# Patient Record
Sex: MALE | Race: WHITE | Employment: OTHER | URBAN - METROPOLITAN AREA
[De-identification: names, ages, dates, MRNs, and addresses within clinical notes are randomized per-mention and may not be internally consistent; named-entity substitution may affect disease eponyms.]

---

## 2018-10-11 ENCOUNTER — HOSPITAL ENCOUNTER (OUTPATIENT)
Dept: NUCLEAR MEDICINE | Age: 63
Discharge: HOME | End: 2018-10-11
Payer: COMMERCIAL

## 2018-10-11 DIAGNOSIS — C61 MALIGNANT NEOPLASM OF PROSTATE (HCC): ICD-10-CM

## 2018-10-11 PROCEDURE — A9503 TC99M MEDRONATE: HCPCS | Performed by: FAMILY MEDICINE

## 2018-10-11 PROCEDURE — 3430000000 HC RX DIAGNOSTIC RADIOPHARMACEUTICAL: Performed by: FAMILY MEDICINE

## 2018-10-11 PROCEDURE — 78306 BONE IMAGING WHOLE BODY: CPT

## 2018-10-11 RX ORDER — TC 99M MEDRONATE 20 MG/10ML
25 INJECTION, POWDER, LYOPHILIZED, FOR SOLUTION INTRAVENOUS
Status: COMPLETED | OUTPATIENT
Start: 2018-10-11 | End: 2018-10-11

## 2018-10-11 RX ADMIN — Medication 25 MILLICURIE: at 07:46

## 2019-09-22 ENCOUNTER — HOSPITAL ENCOUNTER (EMERGENCY)
Age: 64
Discharge: HOME | End: 2019-09-22
Payer: MEDICARE

## 2019-09-22 ENCOUNTER — APPOINTMENT (OUTPATIENT)
Dept: CT IMAGING | Age: 64
End: 2019-09-22
Payer: MEDICARE

## 2019-09-22 VITALS
HEART RATE: 65 BPM | TEMPERATURE: 97.9 F | SYSTOLIC BLOOD PRESSURE: 148 MMHG | RESPIRATION RATE: 15 BRPM | OXYGEN SATURATION: 98 % | DIASTOLIC BLOOD PRESSURE: 78 MMHG

## 2019-09-22 DIAGNOSIS — S01.01XA LACERATION OF SCALP, INITIAL ENCOUNTER: ICD-10-CM

## 2019-09-22 DIAGNOSIS — S09.90XA INJURY OF HEAD, INITIAL ENCOUNTER: ICD-10-CM
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
## 2019-09-22 PROCEDURE — 70450 CT HEAD/BRAIN W/O DYE: CPT

## 2019-09-22 PROCEDURE — 90715 TDAP VACCINE 7 YRS/> IM: CPT | Performed by: PHYSICIAN ASSISTANT

## 2019-09-22 PROCEDURE — 90471 IMMUNIZATION ADMIN: CPT | Performed by: PHYSICIAN ASSISTANT

## 2019-09-22 PROCEDURE — 99284 EMERGENCY DEPT VISIT MOD MDM: CPT

## 2019-09-22 PROCEDURE — 6360000002 HC RX W HCPCS: Performed by: PHYSICIAN ASSISTANT

## 2019-09-22 PROCEDURE — 4500000024 HC ED LEVEL 4 PROCEDURE

## 2019-09-22 RX ORDER — MULTIVITAMIN
1 TABLET ORAL
COMMUNITY

## 2019-09-22 RX ORDER — ZOLPIDEM TARTRATE 10 MG/1
10 TABLET ORAL
COMMUNITY
Start: 2019-09-12 | End: 2019-10-12

## 2019-09-22 RX ADMIN — TETANUS TOXOID, REDUCED DIPHTHERIA TOXOID AND ACELLULAR PERTUSSIS VACCINE, ADSORBED 0.5 ML: 5; 2.5; 8; 8; 2.5 SUSPENSION INTRAMUSCULAR at 22:03

## 2019-09-22 ASSESSMENT — PAIN DESCRIPTION - ONSET: ONSET: SUDDEN

## 2019-09-22 ASSESSMENT — ENCOUNTER SYMPTOMS
NAUSEA: 0
VOMITING: 0
EYES NEGATIVE: 1

## 2019-09-22 ASSESSMENT — PAIN DESCRIPTION - FREQUENCY: FREQUENCY: CONTINUOUS

## 2019-09-22 ASSESSMENT — PAIN DESCRIPTION - LOCATION: LOCATION: HEAD

## 2019-09-22 ASSESSMENT — PAIN - FUNCTIONAL ASSESSMENT: PAIN_FUNCTIONAL_ASSESSMENT: ACTIVITIES ARE NOT PREVENTED

## 2019-09-22 ASSESSMENT — PAIN DESCRIPTION - DESCRIPTORS: DESCRIPTORS: DISCOMFORT

## 2019-09-22 ASSESSMENT — PAIN DESCRIPTION - PROGRESSION: CLINICAL_PROGRESSION: GRADUALLY WORSENING

## 2019-09-22 ASSESSMENT — PAIN DESCRIPTION - PAIN TYPE: TYPE: ACUTE PAIN

## 2019-09-22 ASSESSMENT — PAIN SCALES - GENERAL: PAINLEVEL_OUTOF10: 2

## 2019-09-22 ASSESSMENT — PAIN DESCRIPTION - ORIENTATION: ORIENTATION: POSTERIOR

## 2019-09-23 NOTE — ED NOTES
Bed: B-07  Expected date:   Expected time:   Means of arrival:   Comments:  Head patricia Esposito, CHRISTOPHER  09/22/19 2120

## 2019-09-23 NOTE — ED NOTES
Lac on back of right side of head cleansed at this time. Lac still bleeding slightly. 4x4's placed to lac and secured with tape until Ty Quigley can take a look.       Elie Walker RN  09/22/19 6735

## 2024-02-26 ENCOUNTER — APPOINTMENT (OUTPATIENT)
Dept: GENERAL RADIOLOGY | Age: 69
DRG: 102 | End: 2024-02-26
Payer: MEDICARE

## 2024-02-26 ENCOUNTER — HOSPITAL ENCOUNTER (INPATIENT)
Age: 69
LOS: 1 days | Discharge: HOME | DRG: 102 | End: 2024-02-28
Attending: STUDENT IN AN ORGANIZED HEALTH CARE EDUCATION/TRAINING PROGRAM | Admitting: FAMILY MEDICINE
Payer: MEDICARE

## 2024-02-26 ENCOUNTER — APPOINTMENT (OUTPATIENT)
Dept: CT IMAGING | Age: 69
DRG: 102 | End: 2024-02-26
Payer: MEDICARE

## 2024-02-26 DIAGNOSIS — G44.89 OTHER HEADACHE SYNDROME: Primary | ICD-10-CM

## 2024-02-26 DIAGNOSIS — I16.1 HYPERTENSIVE EMERGENCY: ICD-10-CM

## 2024-02-26 LAB
ANION GAP SERPL CALCULATED.3IONS-SCNC: 16 MMOL/L (ref 3–16)
BASOPHILS # BLD: 0 K/UL (ref 0–0.2)
BASOPHILS NFR BLD: 0.3 %
BUN SERPL-MCNC: 14 MG/DL (ref 7–20)
CALCIUM SERPL-MCNC: 10.2 MG/DL (ref 8.3–10.6)
CHLORIDE SERPL-SCNC: 94 MMOL/L (ref 99–110)
CO2 SERPL-SCNC: 22 MMOL/L (ref 21–32)
CREAT SERPL-MCNC: 0.7 MG/DL (ref 0.8–1.3)
DEPRECATED RDW RBC AUTO: 13.3 % (ref 12.4–15.4)
EOSINOPHIL # BLD: 0 K/UL (ref 0–0.6)
EOSINOPHIL NFR BLD: 0.2 %
GFR SERPLBLD CREATININE-BSD FMLA CKD-EPI: >60 ML/MIN/{1.73_M2}
GLUCOSE SERPL-MCNC: 116 MG/DL (ref 70–99)
HCT VFR BLD AUTO: 48.2 % (ref 40.5–52.5)
HGB BLD-MCNC: 16.5 G/DL (ref 13.5–17.5)
LYMPHOCYTES # BLD: 1.3 K/UL (ref 1–5.1)
LYMPHOCYTES NFR BLD: 8.9 %
MCH RBC QN AUTO: 33.3 PG (ref 26–34)
MCHC RBC AUTO-ENTMCNC: 34.2 G/DL (ref 31–36)
MCV RBC AUTO: 97.4 FL (ref 80–100)
MONOCYTES # BLD: 1.2 K/UL (ref 0–1.3)
MONOCYTES NFR BLD: 7.8 %
NEUTROPHILS # BLD: 12.4 K/UL (ref 1.7–7.7)
NEUTROPHILS NFR BLD: 82.8 %
PLATELET # BLD AUTO: 276 K/UL (ref 135–450)
PMV BLD AUTO: 9.2 FL (ref 5–10.5)
POTASSIUM SERPL-SCNC: 4.5 MMOL/L (ref 3.5–5.1)
RBC # BLD AUTO: 4.95 M/UL (ref 4.2–5.9)
SODIUM SERPL-SCNC: 132 MMOL/L (ref 136–145)
WBC # BLD AUTO: 15 K/UL (ref 4–11)

## 2024-02-26 PROCEDURE — 85025 COMPLETE CBC W/AUTO DIFF WBC: CPT

## 2024-02-26 PROCEDURE — 96365 THER/PROPH/DIAG IV INF INIT: CPT

## 2024-02-26 PROCEDURE — 96368 THER/DIAG CONCURRENT INF: CPT

## 2024-02-26 PROCEDURE — 36415 COLL VENOUS BLD VENIPUNCTURE: CPT

## 2024-02-26 PROCEDURE — 2580000003 HC RX 258: Performed by: PHYSICIAN ASSISTANT

## 2024-02-26 PROCEDURE — 80048 BASIC METABOLIC PNL TOTAL CA: CPT

## 2024-02-26 PROCEDURE — 6360000002 HC RX W HCPCS: Performed by: PHYSICIAN ASSISTANT

## 2024-02-26 PROCEDURE — 96375 TX/PRO/DX INJ NEW DRUG ADDON: CPT

## 2024-02-26 PROCEDURE — 6360000002 HC RX W HCPCS: Performed by: STUDENT IN AN ORGANIZED HEALTH CARE EDUCATION/TRAINING PROGRAM

## 2024-02-26 PROCEDURE — 71046 X-RAY EXAM CHEST 2 VIEWS: CPT

## 2024-02-26 PROCEDURE — 70450 CT HEAD/BRAIN W/O DYE: CPT

## 2024-02-26 PROCEDURE — 99285 EMERGENCY DEPT VISIT HI MDM: CPT

## 2024-02-26 PROCEDURE — 2580000003 HC RX 258: Performed by: STUDENT IN AN ORGANIZED HEALTH CARE EDUCATION/TRAINING PROGRAM

## 2024-02-26 RX ORDER — LABETALOL HYDROCHLORIDE 5 MG/ML
10 INJECTION, SOLUTION INTRAVENOUS ONCE
Status: COMPLETED | OUTPATIENT
Start: 2024-02-26 | End: 2024-02-26

## 2024-02-26 RX ADMIN — CEFTRIAXONE 1000 MG: 1 INJECTION, POWDER, FOR SOLUTION INTRAMUSCULAR; INTRAVENOUS at 22:26

## 2024-02-26 RX ADMIN — VANCOMYCIN HYDROCHLORIDE 2500 MG: 10 INJECTION, POWDER, LYOPHILIZED, FOR SOLUTION INTRAVENOUS at 23:06

## 2024-02-26 RX ADMIN — LABETALOL HYDROCHLORIDE 10 MG: 5 INJECTION, SOLUTION INTRAVENOUS at 21:59

## 2024-02-26 ASSESSMENT — ENCOUNTER SYMPTOMS
SHORTNESS OF BREATH: 0
COUGH: 0
NAUSEA: 1
ABDOMINAL PAIN: 0
DIARRHEA: 0
CHEST TIGHTNESS: 0
VOMITING: 1

## 2024-02-26 ASSESSMENT — LIFESTYLE VARIABLES
HOW MANY STANDARD DRINKS CONTAINING ALCOHOL DO YOU HAVE ON A TYPICAL DAY: 1 OR 2
HOW OFTEN DO YOU HAVE A DRINK CONTAINING ALCOHOL: MONTHLY OR LESS

## 2024-02-26 ASSESSMENT — PAIN - FUNCTIONAL ASSESSMENT
PAIN_FUNCTIONAL_ASSESSMENT: 0-10
PAIN_FUNCTIONAL_ASSESSMENT: 0-10

## 2024-02-26 ASSESSMENT — PAIN DESCRIPTION - PAIN TYPE: TYPE: ACUTE PAIN

## 2024-02-26 ASSESSMENT — PAIN SCALES - GENERAL
PAINLEVEL_OUTOF10: 4
PAINLEVEL_OUTOF10: 4

## 2024-02-26 ASSESSMENT — PAIN DESCRIPTION - LOCATION
LOCATION: HEAD
LOCATION: HEAD

## 2024-02-27 ENCOUNTER — APPOINTMENT (OUTPATIENT)
Dept: CT IMAGING | Age: 69
DRG: 102 | End: 2024-02-27
Payer: MEDICARE

## 2024-02-27 ENCOUNTER — HOSPITAL ENCOUNTER (OUTPATIENT)
Dept: INTERVENTIONAL RADIOLOGY/VASCULAR | Age: 69
Discharge: HOME | DRG: 102 | End: 2024-02-27
Payer: MEDICARE

## 2024-02-27 PROBLEM — I16.1 HYPERTENSIVE EMERGENCY: Status: ACTIVE | Noted: 2024-02-27

## 2024-02-27 PROBLEM — G91.9 HYDROCEPHALUS (HCC): Status: ACTIVE | Noted: 2024-02-27

## 2024-02-27 LAB
ANION GAP SERPL CALCULATED.3IONS-SCNC: 16 MMOL/L (ref 3–16)
BILIRUB UR QL STRIP.AUTO: NEGATIVE
BUN SERPL-MCNC: 13 MG/DL (ref 7–20)
CALCIUM SERPL-MCNC: 9.5 MG/DL (ref 8.3–10.6)
CHLORIDE SERPL-SCNC: 95 MMOL/L (ref 99–110)
CLARITY UR: CLEAR
CO2 SERPL-SCNC: 22 MMOL/L (ref 21–32)
COLOR UR: YELLOW
CREAT SERPL-MCNC: 0.8 MG/DL (ref 0.8–1.3)
GFR SERPLBLD CREATININE-BSD FMLA CKD-EPI: >60 ML/MIN/{1.73_M2}
GLUCOSE SERPL-MCNC: 131 MG/DL (ref 70–99)
GLUCOSE UR STRIP.AUTO-MCNC: NEGATIVE MG/DL
HGB UR QL STRIP.AUTO: NEGATIVE
INR PPP: 1.04 (ref 0.84–1.16)
KETONES UR STRIP.AUTO-MCNC: 15 MG/DL
LEUKOCYTE ESTERASE UR QL STRIP.AUTO: NEGATIVE
NITRITE UR QL STRIP.AUTO: NEGATIVE
PH UR STRIP.AUTO: 6 [PH] (ref 5–8)
POTASSIUM SERPL-SCNC: 3.6 MMOL/L (ref 3.5–5.1)
PROCALCITONIN SERPL IA-MCNC: 0.03 NG/ML (ref 0–0.15)
PROT UR STRIP.AUTO-MCNC: NEGATIVE MG/DL
PROTHROMBIN TIME: 13.7 SEC (ref 11.5–14.8)
SODIUM SERPL-SCNC: 133 MMOL/L (ref 136–145)
SP GR UR STRIP.AUTO: >=1.03 (ref 1–1.03)
UA COMPLETE W REFLEX CULTURE PNL UR: ABNORMAL
UA DIPSTICK W REFLEX MICRO PNL UR: ABNORMAL
URN SPEC COLLECT METH UR: ABNORMAL
UROBILINOGEN UR STRIP-ACNC: 0.2 E.U./DL

## 2024-02-27 PROCEDURE — 2580000003 HC RX 258

## 2024-02-27 PROCEDURE — 99223 1ST HOSP IP/OBS HIGH 75: CPT | Performed by: INTERNAL MEDICINE

## 2024-02-27 PROCEDURE — 6360000002 HC RX W HCPCS: Performed by: NURSE PRACTITIONER

## 2024-02-27 PROCEDURE — 96375 TX/PRO/DX INJ NEW DRUG ADDON: CPT

## 2024-02-27 PROCEDURE — 36415 COLL VENOUS BLD VENIPUNCTURE: CPT

## 2024-02-27 PROCEDURE — 81003 URINALYSIS AUTO W/O SCOPE: CPT

## 2024-02-27 PROCEDURE — 2000000000 HC ICU R&B

## 2024-02-27 PROCEDURE — 87040 BLOOD CULTURE FOR BACTERIA: CPT

## 2024-02-27 PROCEDURE — 6370000000 HC RX 637 (ALT 250 FOR IP): Performed by: PHYSICIAN ASSISTANT

## 2024-02-27 PROCEDURE — 6370000000 HC RX 637 (ALT 250 FOR IP): Performed by: INTERNAL MEDICINE

## 2024-02-27 PROCEDURE — 77003 FLUOROGUIDE FOR SPINE INJECT: CPT

## 2024-02-27 PROCEDURE — 6360000002 HC RX W HCPCS: Performed by: PHYSICIAN ASSISTANT

## 2024-02-27 PROCEDURE — 96366 THER/PROPH/DIAG IV INF ADDON: CPT

## 2024-02-27 PROCEDURE — 6360000002 HC RX W HCPCS: Performed by: EMERGENCY MEDICINE

## 2024-02-27 PROCEDURE — 6360000002 HC RX W HCPCS

## 2024-02-27 PROCEDURE — 85610 PROTHROMBIN TIME: CPT

## 2024-02-27 PROCEDURE — 84145 PROCALCITONIN (PCT): CPT

## 2024-02-27 PROCEDURE — 62273 INJECT EPIDURAL PATCH: CPT

## 2024-02-27 PROCEDURE — 6370000000 HC RX 637 (ALT 250 FOR IP)

## 2024-02-27 PROCEDURE — 80048 BASIC METABOLIC PNL TOTAL CA: CPT

## 2024-02-27 PROCEDURE — 99222 1ST HOSP IP/OBS MODERATE 55: CPT | Performed by: NURSE PRACTITIONER

## 2024-02-27 PROCEDURE — 96376 TX/PRO/DX INJ SAME DRUG ADON: CPT

## 2024-02-27 PROCEDURE — 2580000003 HC RX 258: Performed by: EMERGENCY MEDICINE

## 2024-02-27 PROCEDURE — 70450 CT HEAD/BRAIN W/O DYE: CPT

## 2024-02-27 PROCEDURE — 3E0R3GC INTRODUCTION OF OTHER THERAPEUTIC SUBSTANCE INTO SPINAL CANAL, PERCUTANEOUS APPROACH: ICD-10-PCS | Performed by: RADIOLOGY

## 2024-02-27 RX ORDER — LABETALOL HYDROCHLORIDE 5 MG/ML
10 INJECTION, SOLUTION INTRAVENOUS ONCE
Status: COMPLETED | OUTPATIENT
Start: 2024-02-27 | End: 2024-02-27

## 2024-02-27 RX ORDER — ACETAMINOPHEN 650 MG/1
650 SUPPOSITORY RECTAL EVERY 6 HOURS PRN
Status: DISCONTINUED | OUTPATIENT
Start: 2024-02-27 | End: 2024-02-28 | Stop reason: HOSPADM

## 2024-02-27 RX ORDER — ZOLPIDEM TARTRATE 10 MG/1
10 TABLET ORAL NIGHTLY PRN
COMMUNITY

## 2024-02-27 RX ORDER — DEXAMETHASONE 4 MG/1
4 TABLET ORAL EVERY 12 HOURS SCHEDULED
Status: DISCONTINUED | OUTPATIENT
Start: 2024-02-27 | End: 2024-02-28 | Stop reason: HOSPADM

## 2024-02-27 RX ORDER — POLYETHYLENE GLYCOL 3350 17 G/17G
17 POWDER, FOR SOLUTION ORAL DAILY PRN
Status: DISCONTINUED | OUTPATIENT
Start: 2024-02-27 | End: 2024-02-28 | Stop reason: HOSPADM

## 2024-02-27 RX ORDER — HYDRALAZINE HYDROCHLORIDE 10 MG/1
10 TABLET, FILM COATED ORAL EVERY 8 HOURS SCHEDULED
Status: DISCONTINUED | OUTPATIENT
Start: 2024-02-27 | End: 2024-02-27

## 2024-02-27 RX ORDER — ONDANSETRON 2 MG/ML
4 INJECTION INTRAMUSCULAR; INTRAVENOUS EVERY 6 HOURS PRN
Status: DISCONTINUED | OUTPATIENT
Start: 2024-02-27 | End: 2024-02-28 | Stop reason: HOSPADM

## 2024-02-27 RX ORDER — METOPROLOL SUCCINATE 25 MG/1
25 TABLET, EXTENDED RELEASE ORAL DAILY
Status: ON HOLD | COMMUNITY
End: 2024-02-28 | Stop reason: HOSPADM

## 2024-02-27 RX ORDER — MORPHINE SULFATE 4 MG/ML
4 INJECTION INTRAVENOUS ONCE
Status: COMPLETED | OUTPATIENT
Start: 2024-02-27 | End: 2024-02-27

## 2024-02-27 RX ORDER — LISINOPRIL 10 MG/1
10 TABLET ORAL DAILY
Status: DISCONTINUED | OUTPATIENT
Start: 2024-02-27 | End: 2024-02-28

## 2024-02-27 RX ORDER — ONDANSETRON 4 MG/1
4 TABLET, ORALLY DISINTEGRATING ORAL EVERY 8 HOURS PRN
Status: DISCONTINUED | OUTPATIENT
Start: 2024-02-27 | End: 2024-02-28 | Stop reason: HOSPADM

## 2024-02-27 RX ORDER — ACETAMINOPHEN 325 MG/1
650 TABLET ORAL EVERY 6 HOURS PRN
Status: DISCONTINUED | OUTPATIENT
Start: 2024-02-27 | End: 2024-02-28 | Stop reason: HOSPADM

## 2024-02-27 RX ORDER — SODIUM CHLORIDE 0.9 % (FLUSH) 0.9 %
5-40 SYRINGE (ML) INJECTION PRN
Status: DISCONTINUED | OUTPATIENT
Start: 2024-02-27 | End: 2024-02-28 | Stop reason: HOSPADM

## 2024-02-27 RX ORDER — LABETALOL HYDROCHLORIDE 5 MG/ML
10 INJECTION, SOLUTION INTRAVENOUS EVERY 4 HOURS PRN
Status: DISCONTINUED | OUTPATIENT
Start: 2024-02-27 | End: 2024-02-28 | Stop reason: HOSPADM

## 2024-02-27 RX ORDER — METOPROLOL SUCCINATE 50 MG/1
50 TABLET, EXTENDED RELEASE ORAL DAILY
Status: DISCONTINUED | OUTPATIENT
Start: 2024-02-27 | End: 2024-02-28 | Stop reason: HOSPADM

## 2024-02-27 RX ORDER — SODIUM CHLORIDE 0.9 % (FLUSH) 0.9 %
5-40 SYRINGE (ML) INJECTION EVERY 12 HOURS SCHEDULED
Status: DISCONTINUED | OUTPATIENT
Start: 2024-02-27 | End: 2024-02-28 | Stop reason: HOSPADM

## 2024-02-27 RX ORDER — SODIUM CHLORIDE 9 MG/ML
INJECTION, SOLUTION INTRAVENOUS PRN
Status: DISCONTINUED | OUTPATIENT
Start: 2024-02-27 | End: 2024-02-28 | Stop reason: HOSPADM

## 2024-02-27 RX ADMIN — ACETAMINOPHEN 650 MG: 325 TABLET ORAL at 15:01

## 2024-02-27 RX ADMIN — VANCOMYCIN HYDROCHLORIDE 1250 MG: 10 INJECTION, POWDER, LYOPHILIZED, FOR SOLUTION INTRAVENOUS at 20:21

## 2024-02-27 RX ADMIN — HYDRALAZINE HYDROCHLORIDE 10 MG: 10 TABLET, FILM COATED ORAL at 00:38

## 2024-02-27 RX ADMIN — CEFTRIAXONE SODIUM 2000 MG: 2 INJECTION, POWDER, FOR SOLUTION INTRAMUSCULAR; INTRAVENOUS at 11:50

## 2024-02-27 RX ADMIN — SODIUM CHLORIDE, PRESERVATIVE FREE 10 ML: 5 INJECTION INTRAVENOUS at 20:21

## 2024-02-27 RX ADMIN — SODIUM CHLORIDE 7.5 MG/HR: 9 INJECTION, SOLUTION INTRAVENOUS at 09:54

## 2024-02-27 RX ADMIN — ACETAMINOPHEN 650 MG: 325 TABLET ORAL at 21:50

## 2024-02-27 RX ADMIN — VANCOMYCIN HYDROCHLORIDE 1250 MG: 10 INJECTION, POWDER, LYOPHILIZED, FOR SOLUTION INTRAVENOUS at 10:15

## 2024-02-27 RX ADMIN — SODIUM CHLORIDE 7.5 MG/HR: 9 INJECTION, SOLUTION INTRAVENOUS at 02:21

## 2024-02-27 RX ADMIN — MORPHINE SULFATE 4 MG: 4 INJECTION INTRAVENOUS at 00:38

## 2024-02-27 RX ADMIN — DEXAMETHASONE 4 MG: 4 TABLET ORAL at 20:17

## 2024-02-27 RX ADMIN — CEFTRIAXONE SODIUM 2000 MG: 2 INJECTION, POWDER, FOR SOLUTION INTRAMUSCULAR; INTRAVENOUS at 21:55

## 2024-02-27 RX ADMIN — METOPROLOL SUCCINATE 50 MG: 50 TABLET, EXTENDED RELEASE ORAL at 09:24

## 2024-02-27 RX ADMIN — LABETALOL HYDROCHLORIDE 10 MG: 5 INJECTION, SOLUTION INTRAVENOUS at 00:38

## 2024-02-27 RX ADMIN — LABETALOL HYDROCHLORIDE 10 MG: 5 INJECTION, SOLUTION INTRAVENOUS at 23:18

## 2024-02-27 RX ADMIN — DEXAMETHASONE 4 MG: 4 TABLET ORAL at 11:49

## 2024-02-27 RX ADMIN — ACETAMINOPHEN 650 MG: 325 TABLET ORAL at 09:27

## 2024-02-27 RX ADMIN — LISINOPRIL 10 MG: 10 TABLET ORAL at 14:51

## 2024-02-27 RX ADMIN — ACETAMINOPHEN 650 MG: 325 TABLET ORAL at 04:49

## 2024-02-27 RX ADMIN — SODIUM CHLORIDE 7.5 MG/HR: 9 INJECTION, SOLUTION INTRAVENOUS at 06:17

## 2024-02-27 ASSESSMENT — PAIN DESCRIPTION - ORIENTATION
ORIENTATION: RIGHT;LEFT;MID;ANTERIOR;POSTERIOR
ORIENTATION: ANTERIOR

## 2024-02-27 ASSESSMENT — PAIN DESCRIPTION - LOCATION
LOCATION: HEAD

## 2024-02-27 ASSESSMENT — PAIN SCALES - GENERAL
PAINLEVEL_OUTOF10: 4
PAINLEVEL_OUTOF10: 5
PAINLEVEL_OUTOF10: 4
PAINLEVEL_OUTOF10: 9
PAINLEVEL_OUTOF10: 4
PAINLEVEL_OUTOF10: 5

## 2024-02-27 ASSESSMENT — PAIN DESCRIPTION - DESCRIPTORS
DESCRIPTORS: ACHING;DISCOMFORT

## 2024-02-27 ASSESSMENT — ENCOUNTER SYMPTOMS
CHEST TIGHTNESS: 0
SHORTNESS OF BREATH: 0
VOMITING: 0
ABDOMINAL PAIN: 0
NAUSEA: 0
COUGH: 0

## 2024-02-27 ASSESSMENT — PAIN SCALES - WONG BAKER
WONGBAKER_NUMERICALRESPONSE: 8
WONGBAKER_NUMERICALRESPONSE: 8

## 2024-02-27 ASSESSMENT — PAIN - FUNCTIONAL ASSESSMENT
PAIN_FUNCTIONAL_ASSESSMENT: ACTIVITIES ARE NOT PREVENTED
PAIN_FUNCTIONAL_ASSESSMENT: ACTIVITIES ARE NOT PREVENTED
PAIN_FUNCTIONAL_ASSESSMENT: PREVENTS OR INTERFERES WITH ALL ACTIVE AND SOME PASSIVE ACTIVITIES

## 2024-02-27 NOTE — CONSULTS
Clinical Pharmacy Progress Note  Medication History     Admit Date: 2/26/2024    Pharmacy consulted to verify home medication list by Dr BRANDAN Bush.    List of of current medications patient is taking is complete. Home Medication list in EPIC updated to reflect changes noted below.    Source of information: Patient interview at bedside, CareEverywhere documentation, pharmacy fills, OARRS report    [facility]'s [facility]     Changes made to medication list:   Medications added:   Metoprolol ER  Sertraline   Zolpidem prn  Other notes:   PS sent to ICU resident.    Current Outpatient Medications   Medication Instructions    metoprolol succinate (TOPROL XL) 25 mg, Oral, DAILY    Multiple Vitamin (MULTIVITAMIN) tablet 1 tablet, Oral    sertraline (ZOLOFT) 50 mg, Oral, DAILY    zolpidem (AMBIEN) 10 mg, Oral, NIGHTLY PRN       Please call with any questions.  Amira BarcenasD., BCPS   2/27/2024 1:51 PM  Wireless: [phone]

## 2024-02-27 NOTE — CARE COORDINATION
Case Management Assessment  Initial Evaluation    Date/Time of Evaluation: 2/27/2024 8:53 AM  Assessment Completed by: Mima Forrester RN    If patient is discharged prior to next notation, then this note serves as note for discharge by case management.    Patient Name: Elio Almodovar                   YOB: 1955  Diagnosis: Hydrocephalus (HCC) [G91.9]  Hypertensive emergency [I16.1]  Other headache syndrome [G44.89]                   Date / Time: 2/26/2024  8:53 PM    Patient Admission Status: Inpatient   Readmission Risk (Low < 19, Mod (19-27), High > 27): Readmission Risk Score: 11.1    Current PCP: Cecil Smith MD  PCP verified by CM? Yes    Chart Reviewed: Yes      History Provided by: Patient, Medical Record  Patient Orientation: Alert and Oriented    Patient Cognition: Alert    Hospitalization in the last 30 days (Readmission):  No    If yes, Readmission Assessment in CM Navigator will be completed.    Advance Directives:      Code Status: Full Code   Patient's Primary Decision Maker is: Legal Next of Kin      Discharge Planning:    Patient lives with: Spouse/Significant Other Type of Home: House  Primary Care Giver: Self  Patient Support Systems include: Spouse/Significant Other   Current Financial resources: Medicare  Current community resources: ECF/Home Care  Current services prior to admission: Durable Medical Equipment, Home Care ([facility])            Current DME: Cane, Walker            Type of Home Care services:  PT, OT, Nursing Services    ADLS  Prior functional level: Assistance with the following:, Mobility  Current functional level: Other (see comment) (TBD pending PTOT evals)    PT AM-PAC:   /24  OT AM-PAC:   /24    Family can provide assistance at DC: Other (comment) (tbd pending extent of needs)  Would you like Case Management to discuss the discharge plan with any other family members/significant others, and if so, who? Yes (spouse)  Plans to Return to Present Housing:

## 2024-02-27 NOTE — CONSULTS
ICU CONSULT       Hospital Day: 1  ICU Day: 1                                                         Code: Full Code  Admit Date: 2/26/2024  PCP: Cecil Smith MD                                  CC: Postural Headache    HISTORY OF PRESENT ILLNESS:   Elio Almodovar is a 68 y.o. male, presented with headaches. Patient has a PMHx of recent NPH lumbar trial, MDD, prostatic neoplasm, HTN.  He was recently discharged from [facility] on 2/19/24 where he had 4 days of NPH lumbar trial.  He did have a head CT which showed moderately enlarged ventricles out of proportion to cerebral sulci which could represent NPH.  He has been having these headaches since discharge.  Headaches are characterized by retro-orbital, intermittent sharp pain with a maximum of 5/10 pain and it would be exacerbated upon standing up.  He has never had any similar symptoms before.  He denies any fevers or chills. CSF last week showed RBC 36,109, total nucleated cells 2,364, neutrophils 37.     At the ED, /87-> 213/98. HR 69.  Labs were pertinent for Na 132, WBC 15.0, neutrophil absolute 12.4.  CT head without contrast showed possible small amount of intraventricular blood posterior right ventricle.  CXR negative for acute disease.     He will be admitted to [facility] to further manage his headaches and hypertensive urgency.    PAST HISTORY:   History reviewed. No pertinent past medical history.    Past Surgical History:   Procedure Laterality Date    PROSTATECTOMY         SocialHistory:   The patient lives at home with wife    Alcohol: Occasional use  Illicit drugs: Denies use  Tobacco: Denies use     Family History:  History reviewed. No pertinent family history.    MEDICATIONS:     No current facility-administered medications on file prior to encounter.     Current Outpatient Medications on File Prior to Encounter   Medication Sig Dispense Refill    Multiple Vitamin (MULTIVITAMIN) tablet Take 1 tablet by mouth           Scheduled Meds:

## 2024-02-27 NOTE — H&P
ICU Note H&P    Patient Name: Elio Almodovar   Admit Date: 2/26/2024   Code:No Order  PCP: Cecil Smith MD   Attending: Fei Francois MD    Chief Complaint: Headache (Pt. Presents to ED with c/o headache since last night. Recently admitted/discharged from [facility] with hydrocephalus-had a lumbar drain placed/removed. Called his doctor today to report headache and was advised to come to ED. )       Subjective   HPI:   Elio Almodovar is a 68 y.o. male, presented with headaches. Patient has a PMHx of recent NPH lumbar trial, MDD, prostatic neoplasm, HTN.  He was recently discharged from [facility] where he had 4 days of NPH lumbar trial.  He did have a head CT which showed moderately enlarged ventricles out of proportion to cerebral sulci which could represent NPH.  He has been having these headaches since discharge.  These headaches are characterized by retro-orbital, intermittent sharp pain with a maximum of 5/10 pain and it would be exacerbated upon standing up.  He has never had any similar symptoms before.  He denies any fevers or chills. CSF last week showed RBC 20203, total nucleated cells 2364, neutrophils 37.    At the ED, /87-> 213/98. HR 69.  Labs were pertinent for Na 132, WBC 15.0, neutrophil absolute 12.4.  CT head without contrast showed possible small amount of intraventricular blood posterior right ventricle.  CXR negative for acute disease.    He will be admitted to [facility] to further manage his headaches and hypertensive urgency.    Past Medical Hx:  History reviewed. No pertinent past medical history.    Past Surgical Hx:      Procedure Laterality Date    PROSTATECTOMY          Home Medication:  Prior to Admission medications    Medication Sig Start Date End Date Taking? Authorizing Provider   Multiple Vitamin (MULTIVITAMIN) tablet Take 1 tablet by mouth    Provider, MD Domitila       Allergies:  No Known Allergies    Social Hx:  Social History     Socioeconomic

## 2024-02-27 NOTE — ED PROVIDER NOTES
ED Attending Attestation Note     Date of evaluation: 2/26/2024    I have discussed the case with the AUSTYN. I have personally performed a history, physical exam, and my own medical decision making. I have reviewed the note and agree with the findings and plan. My assessment reveals a 68-year-old male who recently had a lumbar drain placed for hydrocephalus who was referred here for CT scan and admission for blood patch.  Our neurosurgery team is aware of his arrival and they have been paged for consult.  Given the patient's recent neurosurgical history with fever, leukocytosis, and headache, will plan to treat empirically for meningitis with vancomycin and ceftriaxone.  Neurosurgery recommends CT scan and admission for blood patch.  He will be admitted for aforementioned care.     Devendra Merida MD  03/01/24 0608    
never smoked. He has never used smokeless tobacco. He reports current alcohol use. He reports that he does not use drugs.    Medications     Previous Medications    MULTIPLE VITAMIN (MULTIVITAMIN) TABLET    Take 1 tablet by mouth       Allergies     He has No Known Allergies.    Physical Exam     INITIAL VITALS: BP: (!) 188/87, Temp: 98 °F (36.7 °C), Pulse: 69, Respirations: 18, SpO2: 96 %  Physical Exam  Constitutional:       Appearance: Normal appearance.   HENT:      Head: Normocephalic and atraumatic.   Cardiovascular:      Rate and Rhythm: Normal rate and regular rhythm.      Pulses: Normal pulses.      Heart sounds: Normal heart sounds.   Pulmonary:      Effort: Pulmonary effort is normal.      Breath sounds: Normal breath sounds.   Musculoskeletal:         General: Normal range of motion.      Cervical back: Normal range of motion.      Comments: On examination of the patient's LP site over the lumbar spine there is no evidence of infection including erythema, discharge, fluctuance or induration   Skin:     General: Skin is warm and dry.   Neurological:      Mental Status: He is alert and oriented to person, place, and time.   Psychiatric:         Mood and Affect: Mood normal.         Behavior: Behavior normal.       Diagnostic Results         RADIOLOGY:  CT HEAD WO CONTRAST    (Results Pending)       LABS:   No results found for this visit on 02/26/24.    ED BEDSIDE ULTRASOUND:  No results found.    RECENT VITALS:  BP: (!) 213/98, Temp: 98.6 °F (37 °C), Pulse: 69, Respirations: 18, SpO2: 92 %     Procedures         ED Course     Nursing Notes, Past Medical Hx,Past Surgical Hx, Social Hx, Allergies, and Family Hx were reviewed.         The patient was given the following medications:  No orders of the defined types were placed in this encounter.      CONSULTS:  IP CONSULT TO NEUROSURGERY    MEDICAL DECISION MAKING / ASSESSMENT / PLAN     Elio Almodovar is a 68 y.o. male who is status post placement of lumbar

## 2024-02-27 NOTE — CONSULTS
NEUROSURGERY CONSULT NOTE    ELIO ALMODOVAR  [id number]   1955 2/27/2024    Requesting physician: Fei Francois MD    Reason for consultation: HA since Lumbar drain trial     History of present illness: Elio Almodovar is a 68 y.o. male, presented with headaches. Patient has a PMHx of recent NPH lumbar trial, MDD, prostatic neoplasm, HTN.  He was recently discharged from [facility] on 2/19/24 where he had 4 days of NPH lumbar trial.  He did have a head CT which showed moderately enlarged ventricles out of proportion to cerebral sulci which could represent NPH.  He has small amt of IVH in bilat ventricles. Pt did say he was taking excedrin last dose on Saturday. He has been having these headaches since discharge.  Headaches are characterized by retro-orbital, intermittent sharp pain with a maximum of 5/10 pain and it would be exacerbated upon standing up. He has never had any similar symptoms before.  He denies any fevers or chills. CSF last week showed RBC 36,109, total nucleated cells 2,364, neutrophils 37.     ROS:   GENERAL:  Denies fever or recent illness. Denies weight changes   EYES:  Denies vision change or diplopia  EARS:  Denies hearing loss  CARDIAC:  Denies chest pain  RESPIRATORY:  Denies shortness of breath  SKIN:  Denies rash or lesions   HEM:  Denies excessive bruising  PSYCH:  Denies anxiety or depression  NEURO:  + headache, numbness or tingling or + lateralizing weakness chronic  :  Denies urinary difficulty  GI: Denies nausea, vomiting, diarrhea or constipation  MUSCULOSKELETAL:  No arthralgias    No Known Allergies    History reviewed. No pertinent past medical history.     Past Surgical History:   Procedure Laterality Date    PROSTATECTOMY         Social History     Occupational History    Not on file   Tobacco Use    Smoking status: Never    Smokeless tobacco: Never   Substance and Sexual Activity    Alcohol use: Yes     Comment: occ.    Drug use: Never    Sexual activity: Yes

## 2024-02-28 ENCOUNTER — APPOINTMENT (OUTPATIENT)
Dept: CT IMAGING | Age: 69
DRG: 102 | End: 2024-02-28
Payer: MEDICARE

## 2024-02-28 VITALS
SYSTOLIC BLOOD PRESSURE: 158 MMHG | DIASTOLIC BLOOD PRESSURE: 69 MMHG | WEIGHT: 217.37 LBS | RESPIRATION RATE: 17 BRPM | BODY MASS INDEX: 32.94 KG/M2 | HEART RATE: 59 BPM | TEMPERATURE: 98.6 F | OXYGEN SATURATION: 97 % | HEIGHT: 68 IN

## 2024-02-28 LAB
ANION GAP SERPL CALCULATED.3IONS-SCNC: 12 MMOL/L (ref 3–16)
BASOPHILS # BLD: 0 K/UL (ref 0–0.2)
BASOPHILS NFR BLD: 0.1 %
BUN SERPL-MCNC: 12 MG/DL (ref 7–20)
CALCIUM SERPL-MCNC: 9.2 MG/DL (ref 8.3–10.6)
CHLORIDE SERPL-SCNC: 95 MMOL/L (ref 99–110)
CO2 SERPL-SCNC: 24 MMOL/L (ref 21–32)
CREAT SERPL-MCNC: 0.6 MG/DL (ref 0.8–1.3)
DEPRECATED RDW RBC AUTO: 12.8 % (ref 12.4–15.4)
EOSINOPHIL # BLD: 0 K/UL (ref 0–0.6)
EOSINOPHIL NFR BLD: 0.1 %
GFR SERPLBLD CREATININE-BSD FMLA CKD-EPI: >60 ML/MIN/{1.73_M2}
GLUCOSE SERPL-MCNC: 142 MG/DL (ref 70–99)
HCT VFR BLD AUTO: 45.6 % (ref 40.5–52.5)
HGB BLD-MCNC: 15.9 G/DL (ref 13.5–17.5)
LYMPHOCYTES # BLD: 0.7 K/UL (ref 1–5.1)
LYMPHOCYTES NFR BLD: 6.4 %
MCH RBC QN AUTO: 33.5 PG (ref 26–34)
MCHC RBC AUTO-ENTMCNC: 34.8 G/DL (ref 31–36)
MCV RBC AUTO: 96.3 FL (ref 80–100)
MONOCYTES # BLD: 0.5 K/UL (ref 0–1.3)
MONOCYTES NFR BLD: 4.8 %
NEUTROPHILS # BLD: 9.7 K/UL (ref 1.7–7.7)
NEUTROPHILS NFR BLD: 88.6 %
PLATELET # BLD AUTO: 283 K/UL (ref 135–450)
PMV BLD AUTO: 8.7 FL (ref 5–10.5)
POTASSIUM SERPL-SCNC: 3.9 MMOL/L (ref 3.5–5.1)
RBC # BLD AUTO: 4.74 M/UL (ref 4.2–5.9)
SODIUM SERPL-SCNC: 131 MMOL/L (ref 136–145)
VANCOMYCIN SERPL-MCNC: 13.4 UG/ML
WBC # BLD AUTO: 10.9 K/UL (ref 4–11)

## 2024-02-28 PROCEDURE — 80202 ASSAY OF VANCOMYCIN: CPT

## 2024-02-28 PROCEDURE — 80048 BASIC METABOLIC PNL TOTAL CA: CPT

## 2024-02-28 PROCEDURE — 6370000000 HC RX 637 (ALT 250 FOR IP)

## 2024-02-28 PROCEDURE — 2580000003 HC RX 258

## 2024-02-28 PROCEDURE — 6360000002 HC RX W HCPCS

## 2024-02-28 PROCEDURE — 6370000000 HC RX 637 (ALT 250 FOR IP): Performed by: INTERNAL MEDICINE

## 2024-02-28 PROCEDURE — 70450 CT HEAD/BRAIN W/O DYE: CPT

## 2024-02-28 PROCEDURE — 36415 COLL VENOUS BLD VENIPUNCTURE: CPT

## 2024-02-28 PROCEDURE — 85025 COMPLETE CBC W/AUTO DIFF WBC: CPT

## 2024-02-28 PROCEDURE — 99232 SBSQ HOSP IP/OBS MODERATE 35: CPT | Performed by: INTERNAL MEDICINE

## 2024-02-28 PROCEDURE — 6360000002 HC RX W HCPCS: Performed by: NURSE PRACTITIONER

## 2024-02-28 PROCEDURE — 6370000000 HC RX 637 (ALT 250 FOR IP): Performed by: SURGERY

## 2024-02-28 RX ORDER — NIFEDIPINE 60 MG/1
60 TABLET, EXTENDED RELEASE ORAL DAILY
Status: DISCONTINUED | OUTPATIENT
Start: 2024-02-28 | End: 2024-02-28 | Stop reason: HOSPADM

## 2024-02-28 RX ORDER — LISINOPRIL 20 MG/1
20 TABLET ORAL ONCE
Status: COMPLETED | OUTPATIENT
Start: 2024-02-28 | End: 2024-02-28

## 2024-02-28 RX ORDER — HYDRALAZINE HYDROCHLORIDE 20 MG/ML
5 INJECTION INTRAMUSCULAR; INTRAVENOUS EVERY 4 HOURS PRN
Status: DISCONTINUED | OUTPATIENT
Start: 2024-02-28 | End: 2024-02-28 | Stop reason: HOSPADM

## 2024-02-28 RX ORDER — DEXAMETHASONE 4 MG/1
4 TABLET ORAL EVERY 12 HOURS SCHEDULED
Qty: 8 TABLET | Refills: 0 | Status: SHIPPED | OUTPATIENT
Start: 2024-02-28 | End: 2024-03-03

## 2024-02-28 RX ORDER — METOPROLOL SUCCINATE 50 MG/1
50 TABLET, EXTENDED RELEASE ORAL DAILY
Qty: 30 TABLET | Refills: 3 | Status: SHIPPED | OUTPATIENT
Start: 2024-02-29

## 2024-02-28 RX ORDER — NIFEDIPINE 60 MG/1
60 TABLET, EXTENDED RELEASE ORAL DAILY
Qty: 30 TABLET | Refills: 3 | Status: SHIPPED | OUTPATIENT
Start: 2024-02-28

## 2024-02-28 RX ORDER — METOPROLOL SUCCINATE 50 MG/1
50 TABLET, EXTENDED RELEASE ORAL DAILY
Qty: 30 TABLET | Refills: 3 | OUTPATIENT
Start: 2024-02-29

## 2024-02-28 RX ORDER — LISINOPRIL 20 MG/1
20 TABLET ORAL DAILY
Status: DISCONTINUED | OUTPATIENT
Start: 2024-02-29 | End: 2024-02-28 | Stop reason: HOSPADM

## 2024-02-28 RX ORDER — DEXAMETHASONE 4 MG/1
4 TABLET ORAL EVERY 12 HOURS SCHEDULED
Qty: 16 TABLET | Refills: 0 | OUTPATIENT
Start: 2024-02-28 | End: 2024-03-07

## 2024-02-28 RX ORDER — LISINOPRIL 10 MG/1
10 TABLET ORAL ONCE
Status: COMPLETED | OUTPATIENT
Start: 2024-02-28 | End: 2024-02-28

## 2024-02-28 RX ORDER — LISINOPRIL 20 MG/1
20 TABLET ORAL DAILY
Qty: 30 TABLET | Refills: 3 | Status: SHIPPED | OUTPATIENT
Start: 2024-02-29

## 2024-02-28 RX ORDER — LISINOPRIL 20 MG/1
20 TABLET ORAL DAILY
Qty: 30 TABLET | Refills: 3 | OUTPATIENT
Start: 2024-02-29

## 2024-02-28 RX ADMIN — DEXAMETHASONE 4 MG: 4 TABLET ORAL at 08:42

## 2024-02-28 RX ADMIN — LISINOPRIL 10 MG: 10 TABLET ORAL at 10:10

## 2024-02-28 RX ADMIN — ACETAMINOPHEN 650 MG: 325 TABLET ORAL at 05:21

## 2024-02-28 RX ADMIN — LISINOPRIL 20 MG: 20 TABLET ORAL at 11:52

## 2024-02-28 RX ADMIN — METOPROLOL SUCCINATE 50 MG: 50 TABLET, EXTENDED RELEASE ORAL at 08:42

## 2024-02-28 RX ADMIN — SODIUM CHLORIDE, PRESERVATIVE FREE 10 ML: 5 INJECTION INTRAVENOUS at 08:42

## 2024-02-28 RX ADMIN — HYDRALAZINE HYDROCHLORIDE 5 MG: 20 INJECTION INTRAMUSCULAR; INTRAVENOUS at 10:10

## 2024-02-28 RX ADMIN — NIFEDIPINE 60 MG: 60 TABLET, EXTENDED RELEASE ORAL at 12:15

## 2024-02-28 RX ADMIN — LISINOPRIL 10 MG: 10 TABLET ORAL at 08:42

## 2024-02-28 RX ADMIN — VANCOMYCIN HYDROCHLORIDE 1250 MG: 10 INJECTION, POWDER, LYOPHILIZED, FOR SOLUTION INTRAVENOUS at 08:39

## 2024-02-28 RX ADMIN — SERTRALINE HYDROCHLORIDE 50 MG: 50 TABLET ORAL at 10:11

## 2024-02-28 ASSESSMENT — ENCOUNTER SYMPTOMS
VOMITING: 0
COUGH: 0
NAUSEA: 0
ABDOMINAL PAIN: 0
CHEST TIGHTNESS: 0
DIARRHEA: 0
SHORTNESS OF BREATH: 0

## 2024-02-28 NOTE — PROGRESS NOTES
NEUROSURGERY  PROGRESS NOTE    Patient Name: Elio Almodovar YOB: 1955   Sex: Male Age: 68 yrs     Medical Record Number: [id number] Acct Number: [id number]   Room Number: 4501/4501-01 Hospital Day: Hospital Day: 3     Interval History:  Post-operative Day# 1 s/p Blood patch    Subjective: Pt HA is much better.    Objective:    VITAL SIGNS   BP (!) 152/116   Pulse 60   Temp 98.2 °F (36.8 °C) (Temporal)   Resp 14   Ht 1.727 m (5' 8\")   Wt 98.6 kg (217 lb 6 oz)   SpO2 92%   BMI 33.05 kg/m²    Height Height: 172.7 cm (5' 8\")   Weight Weight - Scale: 98.6 kg (217 lb 6 oz)        Allergies No Known Allergies   NPO Status ADULT DIET; Regular   Isolation No active isolations     LABS   Basic Metabolic Profile Recent Labs     02/26/24  2133 02/27/24  0441 02/28/24  0341   * 133* 131*   CL 94* 95* 95*   CO2 22 22 24   BUN 14 13 12   CREATININE 0.7* 0.8 0.6*   GLUCOSE 116* 131* 142*      Complete Blood Count Recent Labs     02/26/24  2133 02/28/24  0341   WBC 15.0* 10.9   RBC 4.95 4.74      Coagulation Studies Recent Labs     02/27/24  0950   INR 1.04        MEDICATIONS   Inpatient Medications     sodium chloride flush, 5-40 mL, IntraVENous, 2 times per day    cefTRIAXone (ROCEPHIN) IV, 2,000 mg, IntraVENous, Q12H    vancomycin, 1,250 mg, IntraVENous, Q12H    metoprolol succinate, 50 mg, Oral, Daily    dexAMETHasone, 4 mg, Oral, 2 times per day    lisinopril, 10 mg, Oral, Daily   Infusions    niCARdipine Stopped (02/27/24 1136)    sodium chloride        Antibiotics   Recent Abx Admin                     cefTRIAXone (ROCEPHIN) 2,000 mg in sodium chloride 0.9 % 50 mL IVPB (mini-bag) (mg) 2,000 mg New Bag 02/27/24 2155     2,000 mg New Bag  1150    vancomycin (VANCOCIN) 1,250 mg in sodium chloride 0.9 % 250 mL IVPB (mg) 1,250 mg New Bag 
4 Eyes Skin Assessment     NAME:  Elio Almodovar  YOB: 1955  MEDICAL RECORD NUMBER:  [id number]    The patient is being assessed for  Admission    I agree that at least one RN has performed a thorough Head to Toe Skin Assessment on the patient. ALL assessment sites listed below have been assessed.      Areas assessed by both nurses:    Head, Face, Ears, Shoulders, Back, Chest, Arms, Elbows, Hands, Sacrum. Buttock, Coccyx, Ischium, Legs. Feet and Heels, Under Medical Devices , and Other          Does the Patient have a Wound? No noted wound(s)       Cesar Prevention initiated by RN: Yes  Wound Care Orders initiated by RN: Yes    Pressure Injury (Stage 3,4, Unstageable, DTI, NWPT, and Complex wounds) if present, place Wound referral order by RN under : No    New Ostomies, if present place, Ostomy referral order under : No     Nurse 1 eSignature: Electronically signed by Eileen Snowden RN on 2/27/24 at 4:35 AM EST    **SHARE this note so that the co-signing nurse can place an eSignature**    Nurse 2 eSignature: Electronically signed by Carlos Gold RN on 2/27/24 at 7:48 AM EST    
PRN hydralazine added, and lisinopril added for b/p management. Care ongoing.   
Patient off the floor for procedure.   
Pt admitted to room 4501. Bathed with CHG wipes. Sacral heart in place. Pt oriented to room and call light within reach. Standard safety measures in place.  
Pt set to d/c at this time. D/C instructions read with all questions answered. Off tele, PIV's removed. Wife bedside .  
[facility] - Clinical Pharmacy Note    Vancomycin - Management by Pharmacy    Consult Date: 2/26/24  Consulting Provider: :Devendra Merida     A vancomycin loading dose of 2500 mg x1 (~ 25 mg/kg) has been ordered for the patient.     If vancomycin is desired to continue on admission, a new consult must be placed for pharmacy to continue dosing.     Thank you for consulting pharmacy,    Kirby Celeste PharmD  Main Pharmacy: [id number]     
[facility] -  Clinical Pharmacy Note    Vancomycin - Management by Pharmacy    Consult Date(s): 02/27/24  Consulting Provider(s): Arthur Bush    Assessment / Plan  Empiric CNS infection - Vancomycin  Concurrent Antimicrobials:   Ceftriaxone  Day of Vanc Therapy / Ordered Duration: Day 2  Current Dosing Method: Bayesian-Guided AUC Dosing  Therapeutic Goal: -600 mg/L*hr for CNS infection  Current Dose / Plan:   SCr today 0.8, which appears stable from 0.7 on admission.    Currently on 1250mg IV q12h, after receiving a 2500mg IV load last night.    Regimen predicts an AUC of ~527 mg/L*hr and trough ~15.3 mg/L.   Continue current regimen.  Plan to obtain a level 2/28 AM to assess kinetics.    Will continue to monitor clinical condition and make adjustments to regimen as appropriate.    Thank you for consulting pharmacy,  Amira Dickey PharmD., [facility]   2/27/2024 7:10 AM  Wireless: [phone]          Interval update:  Patient remains afebrile and hypertensive.  Empiric antibiotics continue.  Neurosurg may place blood patch today.      Subjective/Objective:   Elio Almodovar is a 68 y.o. male with a PMHx significant for normal pressure hydrocephalus with lumbar drain placement trial at [facility] (placed 2/15), as well as MDD, prostatic neoplasm, and HTN.  Presents with persistent headache and n/v.  Concern for possible CNS infection.       Pharmacy is consulted to dose vancomycin.    Ht Readings from Last 1 Encounters:   02/26/24 1.727 m (5' 8\")     Wt Readings from Last 1 Encounters:   02/27/24 100.7 kg (222 lb 0.1 oz)     Current & Prior Antimicrobial Regimen(s):  Ceftriaxone   Vancomycin - pharmacy to dose  2500mg IV x1 (2/26)  1250mg IV q12h (2/27-current)    Vancomycin Level(s) / Doses:    Date Time Dose Type of Level / Level Interpretation   2/28 0600 1250mg IV q12h Random - ordered           Note: Serum levels collected for AUC-based dosing may be high if collected in close proximity to the dose 
[facility] -  Clinical Pharmacy Note    Vancomycin - Management by Pharmacy    Consult Date(s): 02/27/24  Consulting Provider(s): Arthur Bush    Assessment / Plan  CNS infection - Vancomycin  Concurrent Antimicrobials:   Ceftriaxone  Day of Vanc Therapy / Ordered Duration: Day 1  Current Dosing Method: Bayesian-Guided AUC Dosing  Therapeutic Goal: -600 mg/L*hr  Current Dose / Plan:   Vancomycin 2500mg IV x 1 dose followed by 1250mg IV Q12H.  Estimated AUC of 465 mg/L*hr & Ctrough of 12.8 mg/L  Will continue to monitor clinical condition and make adjustments to regimen as appropriate.    Thank you for consulting pharmacy,    Jose Pastrana, [facility] ext [phone]        Interval update:  New Start    Subjective/Objective:   Elio Almodovar is a 68 y.o. male with a PMHx significant for recent NPH lumbar trial, MDD, prostatic neoplasm, HTN,  who is admitted with headaches.     Pharmacy is consulted to dose vancomycin.    Ht Readings from Last 1 Encounters:   02/26/24 1.727 m (5' 8\")     Wt Readings from Last 1 Encounters:   02/27/24 100.7 kg (222 lb 0.1 oz)     Current & Prior Antimicrobial Regimen(s):  Ceftriaxone 1000mg IV x 1 dose followed by Ceftriaxone 2000mg IV Q12H.  Vancomycin 2500mg IV x 1 dose followed by 1250mg IV Q12H.    Vancomycin Level(s) / Doses:    Date Time Dose Type of Level / Level Interpretation                 Note: Serum levels collected for AUC-based dosing may be high if collected in close proximity to the dose administered. This is not necessarily indicative of toxicity.    Cultures & Sensitivities:    Date Site Micro Susceptibility / Result                 Recent Labs     02/26/24  2133   CREATININE 0.7*   BUN 14   WBC 15.0*       Estimated Creatinine Clearance: 115 mL/min (A) (based on SCr of 0.7 mg/dL (L)).    Additional Lab Values / Findings of Note:    No results for input(s): \"PROCAL\" in the last 72 hours.   
[facility] -  Clinical Pharmacy Note    Vancomycin - Management by Pharmacy    Consult Date(s): 02/27/24  Consulting Provider(s): Arthur Bush    Assessment / Plan  Empiric CNS infection - Vancomycin  Concurrent Antimicrobials:   Ceftriaxone  Day of Vanc Therapy / Ordered Duration: Day 2  Current Dosing Method: Bayesian-Guided AUC Dosing  Therapeutic Goal: -600 mg/L*hr for CNS infection  Current Dose / Plan:   Renal function stable; SCr today 0.6.    Currently on 1250mg IV q12h.   Level today = 13.4 mg/L, drawn ~7.5h after 3rd dose.    Calculated AUC = 335 mg/L*hr and trough = 7.8 mg/L, which is well below goal.   Will adjust to 1250mg IV q8h.  New regimen predicts an AUC of ~492 mg/L*hr.  This is just below target AUC range of 500-600, but other regimens will overshoot target.    Plan to obtain a level in 2 days to reassess.  Will continue to monitor clinical condition and make adjustments to regimen as appropriate.    Thank you for consulting pharmacy,  Amira BarcenasD., [facility]   2/28/2024 7:30 AM  Wireless: [phone]          Interval update:  Patient remains afebrile and hypertensive.  Empiric antibiotics continue.  Neurosurg placed blood patch yesterday.  Now on regular diet.      Subjective/Objective:   Elio Almodovar is a 68 y.o. male with a PMHx significant for normal pressure hydrocephalus with lumbar drain placement trial at [facility] (placed 2/15), as well as MDD, prostatic neoplasm, and HTN.  Presents with persistent headache and n/v.  Concern for possible CNS infection.       Pharmacy is consulted to dose vancomycin.    Ht Readings from Last 1 Encounters:   02/26/24 1.727 m (5' 8\")     Wt Readings from Last 1 Encounters:   02/28/24 98.6 kg (217 lb 6 oz)     Current & Prior Antimicrobial Regimen(s):  Ceftriaxone   Vancomycin - pharmacy to dose  2500mg IV x1 (2/26)   (2/27-2/28)  1250mg IV q8h (2/28-current)    Vancomycin Level(s) / Doses:    Date Time Dose Type of Level / Level 
  IMPRESSION :      Lumbar epidural blood patch.         DAP : 6321 mGycm?   Blood loss : minimal (less than 5 cc)   Specimens removed : none      CT HEAD WO CONTRAST   Final Result   1. Slight increase in the amount of intraventricular hemorrhage in the posterior horn of the left lateral ventricle.   2. Stable central ventricular enlargement suggesting central atrophy. Normal pressure hydrocephalus is not excluded.      Electronically signed by Farhan Plummer MD      XR CHEST (2 VW)   Final Result   1. No acute disease.      Electronically signed by Farhan Plummer MD      CT HEAD WO CONTRAST   Final Result   Possible small amount of intraventricular blood posterior right ventricle.   Cortical atrophy and lateral ventricle dilatation with a previous exam      Electronically signed by Nimisha Amato            Assessment/Plan:   Elio Almodovar is a 68 y.o. male, with PMHx of recent NPH lumbar trial, MDD, prostatic adenocarcinoma, and HTN who presented with postural headache.     Postural Headache likely 2/2 Recent Spinal Tap  Normal Pressure Hydrocephalus  Concern for CNS Infection  Had a recent lumbar drain trial due to concerns of NPH from CT imaging as well as unsteady gait and has been experiencing intermittent b/l retro-orbital pain (5/10) upon standing up.  Headache may be due to his lumbar tap and he may require an epidural blood patch. R/o CNS infection. CXR showed no evidence of cardiopulmonary disease. Procal 0.03. WBC on presentation were 15 with neutrophils of 12.4.  - Neurosurgery following; appreciate recs  - Blood patch placed yesterday  - Pharmacy; Vancomycin  - Ceftriaxone 2000 mg q12h  - Decadron 4 mg BID        Hypertensive Urgency  He has also had BP of 213/98 at the ED and he checks his BP at home and SBP normally runs in the 150s. Two doses of labetalol 10 mg and hydralazine 10 mg were given at the ED however to no avail.  - Off Nicardipine gtt  - Metoprolol XL 50 mg  - Lisinopril 10 mg

## 2024-02-28 NOTE — DISCHARGE INSTRUCTIONS
Do not take any NSAIDS like ibuprofen, naproxen, aleve, excedrin, Aspirin for the next 2 weeks. You may take tylenol.

## 2024-02-28 NOTE — CARE COORDINATION
Case Management Assessment            Discharge Note                    Date / Time of Note: 2/28/2024 12:37 PM                  Discharge Note Completed by: Mima Forrester RN    Patient Name: Elio Almodovar   YOB: 1955  Diagnosis: Hydrocephalus (HCC) [G91.9]  Hypertensive emergency [I16.1]  Other headache syndrome [G44.89]   Date / Time: 2/26/2024  8:53 PM    Current PCP: Cecil Smith MD  Clinic patient: Yes    Hospitalization in the last 30 days: No       Advance Directives:  Code Status: Full Code    Financial:  Payor: [facility] MEDICARE / Plan: [facility] MEDICARE ADVANTAGE / Product Type: *No Product type* /      Pharmacy:  No Pharmacies Listed    Assistance purchasing medications?: Potential Assistance Purchasing Medications: No    DISCHARGE Disposition: Home- No Services Needed      IMM Completed:   Yes, Case management has presented and reviewed IMM letter #2 to the patient and/or family/ POA. Patient and/or family/POA verbalized understanding of their medicare rights and appeal process if needed. Patient and/or family/POA has signed and placed today's date (2/28/2024) and time (1230) on letter #2 on the the appropriate lines. Patient and/or family/POA, provided copy of signed letter and they are aware that this original copy of IMM letter #2 is available prior to discharge from the paper chart on the unit.  Electronic documentation has been entered into epic for IMM letter #2 and original paper copy has been added to the paper chart at the nurses station.     IMM Letter given to Patient/Family/Significant other/Guardian/POA/by:: Mima Forrester RN  2ND IMM  IMM Letter date given:: 02/28/24  IMM Letter time given:: 1230    Mr. Almodovar is in agreement with discharge within the 4 hour window pf presentation of the second IMM    Transportation:  Transportation PLAN for discharge: family   Mode of Transport: Private Car    Home Care:  Declined    Additional CM Notes: Met with Mr. Almodovar and

## 2024-02-28 NOTE — CONSULTS
Vancomycin has been discontinued. Pharmacy will sign off consult. If medication dosing is resumed, please re-consult pharmacy.    Amira BarcenasD., BCPS   2/28/2024 10:02 AM  Wireless: [phone]

## 2024-03-02 LAB
BACTERIA BLD CULT ORG #2: NORMAL
BACTERIA BLD CULT: NORMAL

## 2024-04-03 ENCOUNTER — TELEPHONE (OUTPATIENT)
Dept: SURGERY | Age: 69
End: 2024-04-03

## 2024-04-08 ENCOUNTER — OFFICE VISIT (OUTPATIENT)
Dept: SURGERY | Age: 69
End: 2024-04-08
Payer: MEDICARE

## 2024-04-08 VITALS
WEIGHT: 222 LBS | SYSTOLIC BLOOD PRESSURE: 126 MMHG | OXYGEN SATURATION: 97 % | HEART RATE: 62 BPM | BODY MASS INDEX: 33.75 KG/M2 | DIASTOLIC BLOOD PRESSURE: 63 MMHG

## 2024-04-08 DIAGNOSIS — G91.2 NPH (NORMAL PRESSURE HYDROCEPHALUS) (HCC): Primary | ICD-10-CM

## 2024-04-08 PROCEDURE — 1123F ACP DISCUSS/DSCN MKR DOCD: CPT | Performed by: SURGERY

## 2024-04-08 PROCEDURE — 99203 OFFICE O/P NEW LOW 30 MIN: CPT | Performed by: SURGERY

## 2024-04-08 PROCEDURE — 3078F DIAST BP <80 MM HG: CPT | Performed by: SURGERY

## 2024-04-08 PROCEDURE — 3074F SYST BP LT 130 MM HG: CPT | Performed by: SURGERY

## 2024-04-17 ENCOUNTER — HOSPITAL ENCOUNTER (OUTPATIENT)
Dept: INTERVENTIONAL RADIOLOGY/VASCULAR | Age: 69
Discharge: HOME | End: 2024-04-17
Payer: MEDICARE

## 2024-04-17 VITALS
OXYGEN SATURATION: 94 % | TEMPERATURE: 97.6 F | HEIGHT: 68 IN | DIASTOLIC BLOOD PRESSURE: 43 MMHG | BODY MASS INDEX: 31.07 KG/M2 | WEIGHT: 205 LBS | RESPIRATION RATE: 16 BRPM | SYSTOLIC BLOOD PRESSURE: 128 MMHG | HEART RATE: 54 BPM

## 2024-04-17 PROCEDURE — 2500000003 HC RX 250 WO HCPCS: Performed by: PHYSICAL MEDICINE & REHABILITATION

## 2024-04-17 PROCEDURE — 6360000004 HC RX CONTRAST MEDICATION: Performed by: PHYSICAL MEDICINE & REHABILITATION

## 2024-04-17 PROCEDURE — 64483 NJX AA&/STRD TFRM EPI L/S 1: CPT

## 2024-04-17 PROCEDURE — 6360000002 HC RX W HCPCS: Performed by: PHYSICAL MEDICINE & REHABILITATION

## 2024-04-17 PROCEDURE — 99152 MOD SED SAME PHYS/QHP 5/>YRS: CPT

## 2024-04-17 RX ORDER — MIDAZOLAM HYDROCHLORIDE 1 MG/ML
INJECTION INTRAMUSCULAR; INTRAVENOUS PRN
Status: COMPLETED | OUTPATIENT
Start: 2024-04-17 | End: 2024-04-17

## 2024-04-17 RX ORDER — FENTANYL CITRATE 50 UG/ML
INJECTION, SOLUTION INTRAMUSCULAR; INTRAVENOUS PRN
Status: COMPLETED | OUTPATIENT
Start: 2024-04-17 | End: 2024-04-17

## 2024-04-17 RX ORDER — LIDOCAINE HYDROCHLORIDE 10 MG/ML
INJECTION, SOLUTION EPIDURAL; INFILTRATION; INTRACAUDAL; PERINEURAL PRN
Status: COMPLETED | OUTPATIENT
Start: 2024-04-17 | End: 2024-04-17

## 2024-04-17 RX ORDER — DEXAMETHASONE SODIUM PHOSPHATE 4 MG/ML
INJECTION, SOLUTION INTRA-ARTICULAR; INTRALESIONAL; INTRAMUSCULAR; INTRAVENOUS; SOFT TISSUE PRN
Status: COMPLETED | OUTPATIENT
Start: 2024-04-17 | End: 2024-04-17

## 2024-04-17 RX ADMIN — DEXAMETHASONE SODIUM PHOSPHATE 10 MG: 4 INJECTION INTRA-ARTICULAR; INTRALESIONAL; INTRAMUSCULAR; INTRAVENOUS; SOFT TISSUE at 09:18

## 2024-04-17 RX ADMIN — MIDAZOLAM HYDROCHLORIDE 1 MG: 2 INJECTION, SOLUTION INTRAMUSCULAR; INTRAVENOUS at 09:16

## 2024-04-17 RX ADMIN — FENTANYL CITRATE 50 MCG: 50 INJECTION, SOLUTION INTRAMUSCULAR; INTRAVENOUS at 09:16

## 2024-04-17 RX ADMIN — IOPAMIDOL 1 ML: 612 INJECTION, SOLUTION INTRAVENOUS at 11:11

## 2024-04-17 RX ADMIN — LIDOCAINE HYDROCHLORIDE 1 ML: 10 INJECTION, SOLUTION EPIDURAL; INFILTRATION; INTRACAUDAL; PERINEURAL at 09:17

## 2024-04-17 NOTE — H&P
HISTORY AND PHYSICAL/PRE-SEDATION ASSESSMENT    Patient:  Elio Almodovar   :  1955  Medical Record No.:  [id number]   Date:  2024  Physician:  Kane Huffman MD  Facility: [facility]    HISTORY OF PRESENT ILLNESS:                 The patient is a 68 y.o. male whom presents with leg pain. Review of the imaging and physical exam of the patient confirmed the pre-procedure diagnosis.  After a thorough discussion of risks, benefits and alternatives informed consent was obtained.    Diagnosis:  Lumbar radiculopathy.    Past Medical History:   No past medical history on file.     Past Surgical History:     Past Surgical History:   Procedure Laterality Date    PROSTATECTOMY         Current Medications:   Prior to Admission medications    Medication Sig Start Date End Date Taking? Authorizing Provider   brexpiprazole (REXULTI) 1 MG TABS tablet  3/28/24   Domitila Meek MD   lisinopril (PRINIVIL;ZESTRIL) 20 MG tablet Take 1 tablet by mouth daily 24   Real Mejia MD   metoprolol succinate (TOPROL XL) 50 MG extended release tablet Take 1 tablet by mouth daily 24   Real Mejia MD   NIFEdipine (PROCARDIA XL) 60 MG extended release tablet Take 1 tablet by mouth daily 24   Real Mejia MD   sertraline (ZOLOFT) 50 MG tablet Take 1 tablet by mouth daily    Domitila Meek MD   zolpidem (AMBIEN) 10 MG tablet Take 1 tablet by mouth nightly as needed for Sleep.    Domitila Meek MD   Multiple Vitamin (MULTIVITAMIN) tablet Take 1 tablet by mouth    Domitila Meek MD       Allergies:  Patient has no known allergies.    Social History:    reports that he has never smoked. He has never used smokeless tobacco. He reports current alcohol use. He reports that he does not use drugs.    Family History:   No family history on file.     Vitals: Temperature 97.6 °F (36.4 °C), temperature source Oral, height 1.727 m (5' 8\"), weight 93 kg (205 lb).    PHYSICAL

## 2024-04-17 NOTE — PROGRESS NOTES
IMAGING SERVICES NURSING PROGRESS NOTE     Procedure:  Left L5 TF JAMIE  April 17, 2024  Elio DRAKE Almodovar    Allergies:  No Known Allergies    There were no vitals filed for this visit.     Procedure explained to patient by MD:   Informed consent obtained:   Family with patient:      Mental Status: Normal    Pain Assessment Pre-Procedure:  Pain Present:  yes: LEFT LOW BACK TO LLE  Pain Score:  5  Pain Quality/Description: Shooting     Time out Procedure Verification with:  [x] RN  [x] Physician  [x] Patient  [x] Other: X Ray tech     Note: Patient arrived A & O x 4, breathing easily on room air, Spoke to Dr. Huffman prior to procedure.  Procedural sedation:  Fentanyl:  50  Versed:    1  Post Procedureal Note:  Patient tolerated procedure well.  Breathing easily on room air.  Report given to [facility] RN.  Patient transported in stable conditon to room 1.     Pain Assessment Post-Procedure:  Pain Present:  YES  Pain Score:  5  Pain Quality/Description:  LEFT LOW BACK TO LLE      Plan of Care Goals:  Safety measures met:  Yes  Patient understands explanation of procedure:  Yes     Time in:  0910  Time out:  0920

## 2024-04-17 NOTE — PROGRESS NOTES
Cath Lab Pre Procedure Flowsheet    Plan of Care:     Hemodynamics and cardiac rhythm will remain stable.   Comfort level will be maintained.   Respiratory function will remain adequate.   Pt/family will verbalize understanding of the procedure.   Procedure will be tolerated without complications.   Patient will recover from procedure without complications.   ID armband on patient and identification verified.   Informed consent obtained.   Non invasive blood pressure cuff applied, monitoring initiated.   EKG pads and pulse oximeter applied, monitoring initiated.   Instructions given. Patient and / or family verbalize understanding.   H&P will be documented by physician in [facility].     Pre-procedure:    NPO Status: Pt has been NPO since midnight. .    Contrast / IV Dye Allergy:     Pregnancy Test: N/A.    Prep Sites:     Odell's Test:NA    Pulses:     Anticoagulants: None.     Antiplatelets: None.     Chief Complaint:      Diabetic: No    Pre EKG Rhythm: Sinus Manuel    Pre SBP:133    IV access:PIV Rt hand 22g    Pre-procedure blood work collected by: RICARDO    NIH Scale:NA

## 2024-04-17 NOTE — OP NOTE
PATIENT:  Elio Almodovar  AGE:  68 yrs  MEDICAL RECORD #:  [id number]  YOB: 1955     DATE:  4/17/2024  PHYSICIAN: Kane Huffman M.D.     PROCEDURE: Left L5 transforaminal epidural steroid injection under fluoroscopy.     PRE-OP DIAGNOSIS:  Low Back Pain/Radiculopathy     POST-OP DIAGNOSIS:  same     HISTORY OF PRESENT ILLNESS:  See office notes. Patient has failed previous less-invasive treatments.     ALLERGIES:  Patient has no known allergies.     MEDICATIONS:    Current Outpatient Medications   Medication Sig Dispense Refill    brexpiprazole (REXULTI) 1 MG TABS tablet       lisinopril (PRINIVIL;ZESTRIL) 20 MG tablet Take 1 tablet by mouth daily 30 tablet 3    metoprolol succinate (TOPROL XL) 50 MG extended release tablet Take 1 tablet by mouth daily 30 tablet 3    NIFEdipine (PROCARDIA XL) 60 MG extended release tablet Take 1 tablet by mouth daily 30 tablet 3    sertraline (ZOLOFT) 50 MG tablet Take 1 tablet by mouth daily      zolpidem (AMBIEN) 10 MG tablet Take 1 tablet by mouth nightly as needed for Sleep.      Multiple Vitamin (MULTIVITAMIN) tablet Take 1 tablet by mouth       Current Facility-Administered Medications   Medication Dose Route Frequency Provider Last Rate Last Admin    fentaNYL (SUBLIMAZE) injection    PRN Kane Huffman MD   50 mcg at 04/17/24 0916    midazolam (VERSED) injection    PRN Kane Huffman MD   1 mg at 04/17/24 0916    lidocaine PF 1 % injection    PRN Kane Huffman MD   1 mL at 04/17/24 0917    dexAMETHasone (DECADRON) injection    PRN Kane Huffman MD   10 mg at 04/17/24 0918        PHYSICAL EXAMINATION:              General:  Awake, alert              Heart:  No audible murmurs, extremities well perfused              Lungs:  No increased WOB or audible wheezing              Extremities:  Normal tone. Warm. No swelling.      Anesthesia: 1 mg Versed and 50 mcg fentanyl    Estimated blood loss: None  Complications: None  Specimen: None    DESCRIPTION

## 2024-04-30 NOTE — PROGRESS NOTES
PATIENT NAME: Elio Almodovar     YOB: 1955     TODAY'S DATE: 4/30/2024    Reason for Visit:  NPH    Requesting Physician:  Dr. Gregg    HISTORY OF PRESENT ILLNESS:              The patient is a 68 y.o. male with a PMHx as delineated below who presents with a past medical history of alcohol abuse, hyperlipidemia, insomnia and history of prostate carcinoma comes in for evaluation of imbalance of gait with frequent falls for last over 1 year progressively getting worse. Patient reports that the degree of imbalance is such that he falls 4-5 times a week and at times he hits his head.     Chief Complaint   Patient presents with    New Patient      Shunt       REVIEW OF SYSTEMS:  CONSTITUTIONAL:  negative  HEENT:  negative  RESPIRATORY:  negative  CARDIOVASCULAR:  negative  GASTROINTESTINAL:  negative   GENITOURINARY:  negative  HEMATOLOGIC/LYMPHATIC:  negative  MUSCULOSKELETAL: negative  NEUROLOGICAL:  negative    PMH  No past medical history on file.    PSH  Past Surgical History:   Procedure Laterality Date    PROSTATECTOMY         Social History  Social History     Socioeconomic History    Marital status:      Spouse name: Not on file    Number of children: Not on file    Years of education: Not on file    Highest education level: Not on file   Occupational History    Not on file   Tobacco Use    Smoking status: Never    Smokeless tobacco: Never   Substance and Sexual Activity    Alcohol use: Yes     Comment: occ.    Drug use: Never    Sexual activity: Yes     Partners: Female   Other Topics Concern    Not on file   Social History Narrative    Not on file     Social Determinants of Health     Financial Resource Strain: Not on file   Food Insecurity: No Food Insecurity (2/27/2024)    Hunger Vital Sign     Worried About Running Out of Food in the Last Year: Never true     Ran Out of Food in the Last Year: Never true   Transportation Needs: No Transportation Needs (2/27/2024)    PRAPARE -

## 2025-06-27 RX ORDER — FOLIC ACID 1 MG/1
1 TABLET ORAL DAILY
Status: ON HOLD | COMMUNITY
End: 2025-07-09 | Stop reason: HOSPADM

## 2025-06-27 RX ORDER — INSULIN GLARGINE 100 [IU]/ML
20 INJECTION, SOLUTION SUBCUTANEOUS DAILY
Status: ON HOLD | COMMUNITY

## 2025-06-27 RX ORDER — ASPIRIN 325 MG
325 TABLET ORAL DAILY
Status: ON HOLD | COMMUNITY
End: 2025-07-09 | Stop reason: HOSPADM

## 2025-06-27 RX ORDER — HYDROMORPHONE HYDROCHLORIDE 2 MG/1
4 TABLET ORAL EVERY 4 HOURS PRN
Status: ON HOLD | COMMUNITY
End: 2025-07-09 | Stop reason: HOSPADM

## 2025-06-27 RX ORDER — THIAMINE MONONITRATE (VIT B1) 100 MG
100 TABLET ORAL DAILY
Status: ON HOLD | COMMUNITY
End: 2025-07-09 | Stop reason: HOSPADM

## 2025-06-27 RX ORDER — ONDANSETRON 4 MG/1
4 TABLET, FILM COATED ORAL EVERY 8 HOURS PRN
Status: ON HOLD | COMMUNITY

## 2025-06-27 RX ORDER — ACETAMINOPHEN 325 MG/1
650 TABLET ORAL EVERY 6 HOURS PRN
Status: ON HOLD | COMMUNITY

## 2025-06-27 RX ORDER — CALCIUM CARBONATE 500 MG/1
500 TABLET, CHEWABLE ORAL DAILY
Status: ON HOLD | COMMUNITY

## 2025-06-27 RX ORDER — PANTOPRAZOLE SODIUM 20 MG/1
20 TABLET, DELAYED RELEASE ORAL 2 TIMES DAILY
Status: ON HOLD | COMMUNITY

## 2025-06-27 RX ORDER — TRAZODONE HYDROCHLORIDE 100 MG/1
100 TABLET ORAL NIGHTLY
Status: ON HOLD | COMMUNITY
Start: 2025-05-27 | End: 2025-07-09 | Stop reason: HOSPADM

## 2025-06-27 RX ORDER — MAGNESIUM OXIDE 400 MG/1
400 TABLET ORAL DAILY
Status: ON HOLD | COMMUNITY

## 2025-06-27 RX ORDER — METHYLPHENIDATE HYDROCHLORIDE 5 MG/1
5 TABLET ORAL 2 TIMES DAILY
Status: ON HOLD | COMMUNITY

## 2025-06-30 ENCOUNTER — ANESTHESIA EVENT (OUTPATIENT)
Dept: OPERATING ROOM | Age: 70
End: 2025-06-30
Payer: COMMERCIAL

## 2025-07-01 ENCOUNTER — HOSPITAL ENCOUNTER (INPATIENT)
Age: 70
LOS: 8 days | Discharge: NURSING, REHAB OR LONG-TERM CARE | DRG: 031 | End: 2025-07-09
Attending: STUDENT IN AN ORGANIZED HEALTH CARE EDUCATION/TRAINING PROGRAM | Admitting: STUDENT IN AN ORGANIZED HEALTH CARE EDUCATION/TRAINING PROGRAM
Payer: MEDICARE

## 2025-07-01 ENCOUNTER — ANESTHESIA (OUTPATIENT)
Dept: OPERATING ROOM | Age: 70
End: 2025-07-01
Payer: COMMERCIAL

## 2025-07-01 ENCOUNTER — APPOINTMENT (OUTPATIENT)
Dept: CT IMAGING | Age: 70
DRG: 031 | End: 2025-07-01
Attending: STUDENT IN AN ORGANIZED HEALTH CARE EDUCATION/TRAINING PROGRAM
Payer: MEDICARE

## 2025-07-01 DIAGNOSIS — I63.9 CEREBROVASCULAR ACCIDENT (CVA), UNSPECIFIED MECHANISM (HCC): Primary | ICD-10-CM

## 2025-07-01 DIAGNOSIS — Z98.2 S/P VP SHUNT: ICD-10-CM

## 2025-07-01 PROBLEM — G91.2 NORMAL PRESSURE HYDROCEPHALUS (HCC): Status: ACTIVE | Noted: 2025-07-01

## 2025-07-01 LAB
ABO/RH: NORMAL
ANION GAP SERPL CALCULATED.3IONS-SCNC: 9 MMOL/L (ref 3–16)
ANTIBODY SCREEN: NORMAL
APTT BLD: 25.3 SEC (ref 22.8–35.8)
BASOPHILS # BLD: 0.1 K/UL (ref 0–0.2)
BASOPHILS NFR BLD: 0.8 %
BUN SERPL-MCNC: 18 MG/DL (ref 7–20)
CALCIUM SERPL-MCNC: 9.9 MG/DL (ref 8.3–10.6)
CHLORIDE SERPL-SCNC: 102 MMOL/L (ref 99–110)
CO2 SERPL-SCNC: 29 MMOL/L (ref 21–32)
CREAT SERPL-MCNC: 1.1 MG/DL (ref 0.8–1.3)
DEPRECATED RDW RBC AUTO: 13.7 % (ref 12.4–15.4)
EOSINOPHIL # BLD: 0.2 K/UL (ref 0–0.6)
EOSINOPHIL NFR BLD: 2 %
GFR SERPLBLD CREATININE-BSD FMLA CKD-EPI: 72 ML/MIN/{1.73_M2}
GLUCOSE BLD-MCNC: 100 MG/DL (ref 70–99)
GLUCOSE BLD-MCNC: 137 MG/DL (ref 70–99)
GLUCOSE BLD-MCNC: 185 MG/DL (ref 70–99)
GLUCOSE SERPL-MCNC: 151 MG/DL (ref 70–99)
HCT VFR BLD AUTO: 43.6 % (ref 40.5–52.5)
HGB BLD-MCNC: 14.7 G/DL (ref 13.5–17.5)
INR PPP: 0.94 (ref 0.86–1.14)
LYMPHOCYTES # BLD: 2.4 K/UL (ref 1–5.1)
LYMPHOCYTES NFR BLD: 23 %
MCH RBC QN AUTO: 30.1 PG (ref 26–34)
MCHC RBC AUTO-ENTMCNC: 33.8 G/DL (ref 31–36)
MCV RBC AUTO: 89.1 FL (ref 80–100)
MONOCYTES # BLD: 0.7 K/UL (ref 0–1.3)
MONOCYTES NFR BLD: 6.8 %
NEUTROPHILS # BLD: 6.9 K/UL (ref 1.7–7.7)
NEUTROPHILS NFR BLD: 67.4 %
PERFORMED ON: ABNORMAL
PLATELET # BLD AUTO: 240 K/UL (ref 135–450)
PMV BLD AUTO: 8.5 FL (ref 5–10.5)
POTASSIUM SERPL-SCNC: 4.8 MMOL/L (ref 3.5–5.1)
PROTHROMBIN TIME: 12.9 SEC (ref 12.1–14.9)
RBC # BLD AUTO: 4.89 M/UL (ref 4.2–5.9)
SODIUM SERPL-SCNC: 140 MMOL/L (ref 136–145)
WBC # BLD AUTO: 10.2 K/UL (ref 4–11)

## 2025-07-01 PROCEDURE — 80048 BASIC METABOLIC PNL TOTAL CA: CPT

## 2025-07-01 PROCEDURE — 85025 COMPLETE CBC W/AUTO DIFF WBC: CPT

## 2025-07-01 PROCEDURE — 0WJG4ZZ INSPECTION OF PERITONEAL CAVITY, PERCUTANEOUS ENDOSCOPIC APPROACH: ICD-10-PCS | Performed by: STUDENT IN AN ORGANIZED HEALTH CARE EDUCATION/TRAINING PROGRAM

## 2025-07-01 PROCEDURE — 86901 BLOOD TYPING SEROLOGIC RH(D): CPT

## 2025-07-01 PROCEDURE — 6360000002 HC RX W HCPCS: Performed by: STUDENT IN AN ORGANIZED HEALTH CARE EDUCATION/TRAINING PROGRAM

## 2025-07-01 PROCEDURE — 3700000001 HC ADD 15 MINUTES (ANESTHESIA): Performed by: STUDENT IN AN ORGANIZED HEALTH CARE EDUCATION/TRAINING PROGRAM

## 2025-07-01 PROCEDURE — 85610 PROTHROMBIN TIME: CPT

## 2025-07-01 PROCEDURE — 86850 RBC ANTIBODY SCREEN: CPT

## 2025-07-01 PROCEDURE — 83036 HEMOGLOBIN GLYCOSYLATED A1C: CPT

## 2025-07-01 PROCEDURE — 86900 BLOOD TYPING SEROLOGIC ABO: CPT

## 2025-07-01 PROCEDURE — 70450 CT HEAD/BRAIN W/O DYE: CPT

## 2025-07-01 PROCEDURE — 2500000003 HC RX 250 WO HCPCS: Performed by: STUDENT IN AN ORGANIZED HEALTH CARE EDUCATION/TRAINING PROGRAM

## 2025-07-01 PROCEDURE — 3600000014 HC SURGERY LEVEL 4 ADDTL 15MIN: Performed by: STUDENT IN AN ORGANIZED HEALTH CARE EDUCATION/TRAINING PROGRAM

## 2025-07-01 PROCEDURE — C1894 INTRO/SHEATH, NON-LASER: HCPCS | Performed by: STUDENT IN AN ORGANIZED HEALTH CARE EDUCATION/TRAINING PROGRAM

## 2025-07-01 PROCEDURE — 2500000003 HC RX 250 WO HCPCS

## 2025-07-01 PROCEDURE — 1200000000 HC SEMI PRIVATE

## 2025-07-01 PROCEDURE — 6370000000 HC RX 637 (ALT 250 FOR IP): Performed by: STUDENT IN AN ORGANIZED HEALTH CARE EDUCATION/TRAINING PROGRAM

## 2025-07-01 PROCEDURE — 00163J6 BYPASS CEREBRAL VENTRICLE TO PERITONEAL CAVITY WITH SYNTHETIC SUBSTITUTE, PERCUTANEOUS APPROACH: ICD-10-PCS | Performed by: STUDENT IN AN ORGANIZED HEALTH CARE EDUCATION/TRAINING PROGRAM

## 2025-07-01 PROCEDURE — 3600000004 HC SURGERY LEVEL 4 BASE: Performed by: STUDENT IN AN ORGANIZED HEALTH CARE EDUCATION/TRAINING PROGRAM

## 2025-07-01 PROCEDURE — 85730 THROMBOPLASTIN TIME PARTIAL: CPT

## 2025-07-01 PROCEDURE — 6370000000 HC RX 637 (ALT 250 FOR IP): Performed by: INTERNAL MEDICINE

## 2025-07-01 PROCEDURE — 3700000000 HC ANESTHESIA ATTENDED CARE: Performed by: STUDENT IN AN ORGANIZED HEALTH CARE EDUCATION/TRAINING PROGRAM

## 2025-07-01 PROCEDURE — 6360000002 HC RX W HCPCS

## 2025-07-01 PROCEDURE — 6360000002 HC RX W HCPCS: Performed by: ANESTHESIOLOGY

## 2025-07-01 PROCEDURE — 2580000003 HC RX 258: Performed by: STUDENT IN AN ORGANIZED HEALTH CARE EDUCATION/TRAINING PROGRAM

## 2025-07-01 PROCEDURE — 7100000001 HC PACU RECOVERY - ADDTL 15 MIN: Performed by: STUDENT IN AN ORGANIZED HEALTH CARE EDUCATION/TRAINING PROGRAM

## 2025-07-01 PROCEDURE — 2709999900 HC NON-CHARGEABLE SUPPLY: Performed by: STUDENT IN AN ORGANIZED HEALTH CARE EDUCATION/TRAINING PROGRAM

## 2025-07-01 PROCEDURE — 7100000000 HC PACU RECOVERY - FIRST 15 MIN: Performed by: STUDENT IN AN ORGANIZED HEALTH CARE EDUCATION/TRAINING PROGRAM

## 2025-07-01 PROCEDURE — 2580000003 HC RX 258: Performed by: ANESTHESIOLOGY

## 2025-07-01 DEVICE — PROGRAMMABLE VALVE IN-LINE VALVE WITH SIPHONGUARD™ DEVICE
Type: IMPLANTABLE DEVICE | Site: BRAIN | Status: FUNCTIONAL
Brand: CODMAN® HAKIM®

## 2025-07-01 RX ORDER — SODIUM CHLORIDE 0.9 % (FLUSH) 0.9 %
5-40 SYRINGE (ML) INJECTION PRN
Status: DISCONTINUED | OUTPATIENT
Start: 2025-07-01 | End: 2025-07-01 | Stop reason: HOSPADM

## 2025-07-01 RX ORDER — DEXAMETHASONE SODIUM PHOSPHATE 4 MG/ML
INJECTION, SOLUTION INTRA-ARTICULAR; INTRALESIONAL; INTRAMUSCULAR; INTRAVENOUS; SOFT TISSUE
Status: DISCONTINUED | OUTPATIENT
Start: 2025-07-01 | End: 2025-07-01 | Stop reason: SDUPTHER

## 2025-07-01 RX ORDER — SODIUM CHLORIDE 0.9 % (FLUSH) 0.9 %
5-40 SYRINGE (ML) INJECTION EVERY 12 HOURS SCHEDULED
Status: DISCONTINUED | OUTPATIENT
Start: 2025-07-01 | End: 2025-07-09 | Stop reason: HOSPADM

## 2025-07-01 RX ORDER — SODIUM CHLORIDE, SODIUM LACTATE, POTASSIUM CHLORIDE, CALCIUM CHLORIDE 600; 310; 30; 20 MG/100ML; MG/100ML; MG/100ML; MG/100ML
INJECTION, SOLUTION INTRAVENOUS CONTINUOUS
Status: DISCONTINUED | OUTPATIENT
Start: 2025-07-01 | End: 2025-07-01 | Stop reason: HOSPADM

## 2025-07-01 RX ORDER — HYDROMORPHONE HYDROCHLORIDE 1 MG/ML
0.5 INJECTION, SOLUTION INTRAMUSCULAR; INTRAVENOUS; SUBCUTANEOUS EVERY 10 MIN PRN
Status: DISCONTINUED | OUTPATIENT
Start: 2025-07-01 | End: 2025-07-01 | Stop reason: HOSPADM

## 2025-07-01 RX ORDER — TRAZODONE HYDROCHLORIDE 100 MG/1
100 TABLET ORAL NIGHTLY
Status: DISCONTINUED | OUTPATIENT
Start: 2025-07-01 | End: 2025-07-03

## 2025-07-01 RX ORDER — LISINOPRIL 20 MG/1
20 TABLET ORAL DAILY
Status: DISCONTINUED | OUTPATIENT
Start: 2025-07-02 | End: 2025-07-09 | Stop reason: HOSPADM

## 2025-07-01 RX ORDER — ACETAMINOPHEN 325 MG/1
650 TABLET ORAL EVERY 6 HOURS
Status: DISCONTINUED | OUTPATIENT
Start: 2025-07-01 | End: 2025-07-09 | Stop reason: HOSPADM

## 2025-07-01 RX ORDER — HYDRALAZINE HYDROCHLORIDE 20 MG/ML
10 INJECTION INTRAMUSCULAR; INTRAVENOUS
Status: DISCONTINUED | OUTPATIENT
Start: 2025-07-01 | End: 2025-07-01 | Stop reason: HOSPADM

## 2025-07-01 RX ORDER — DIPHENHYDRAMINE HYDROCHLORIDE 50 MG/ML
12.5 INJECTION, SOLUTION INTRAMUSCULAR; INTRAVENOUS
Status: DISCONTINUED | OUTPATIENT
Start: 2025-07-01 | End: 2025-07-01 | Stop reason: HOSPADM

## 2025-07-01 RX ORDER — PANTOPRAZOLE SODIUM 20 MG/1
20 TABLET, DELAYED RELEASE ORAL 2 TIMES DAILY
Status: DISCONTINUED | OUTPATIENT
Start: 2025-07-01 | End: 2025-07-09 | Stop reason: HOSPADM

## 2025-07-01 RX ORDER — METHOCARBAMOL 100 MG/ML
INJECTION, SOLUTION INTRAMUSCULAR; INTRAVENOUS
Status: DISCONTINUED | OUTPATIENT
Start: 2025-07-01 | End: 2025-07-01 | Stop reason: SDUPTHER

## 2025-07-01 RX ORDER — SODIUM CHLORIDE 9 MG/ML
INJECTION, SOLUTION INTRAVENOUS PRN
Status: DISCONTINUED | OUTPATIENT
Start: 2025-07-01 | End: 2025-07-09 | Stop reason: HOSPADM

## 2025-07-01 RX ORDER — SODIUM CHLORIDE 9 MG/ML
INJECTION, SOLUTION INTRAVENOUS CONTINUOUS
Status: DISCONTINUED | OUTPATIENT
Start: 2025-07-01 | End: 2025-07-02

## 2025-07-01 RX ORDER — ONDANSETRON 2 MG/ML
4 INJECTION INTRAMUSCULAR; INTRAVENOUS EVERY 6 HOURS PRN
Status: DISCONTINUED | OUTPATIENT
Start: 2025-07-01 | End: 2025-07-09 | Stop reason: HOSPADM

## 2025-07-01 RX ORDER — SODIUM CHLORIDE 9 MG/ML
INJECTION, SOLUTION INTRAVENOUS PRN
Status: DISCONTINUED | OUTPATIENT
Start: 2025-07-01 | End: 2025-07-01 | Stop reason: HOSPADM

## 2025-07-01 RX ORDER — SODIUM CHLORIDE 0.9 % (FLUSH) 0.9 %
5-40 SYRINGE (ML) INJECTION EVERY 12 HOURS SCHEDULED
Status: DISCONTINUED | OUTPATIENT
Start: 2025-07-01 | End: 2025-07-01 | Stop reason: HOSPADM

## 2025-07-01 RX ORDER — INSULIN LISPRO 100 [IU]/ML
0-4 INJECTION, SOLUTION INTRAVENOUS; SUBCUTANEOUS
Status: DISCONTINUED | OUTPATIENT
Start: 2025-07-01 | End: 2025-07-09 | Stop reason: HOSPADM

## 2025-07-01 RX ORDER — LABETALOL HYDROCHLORIDE 5 MG/ML
10 INJECTION, SOLUTION INTRAVENOUS
Status: DISCONTINUED | OUTPATIENT
Start: 2025-07-01 | End: 2025-07-01 | Stop reason: HOSPADM

## 2025-07-01 RX ORDER — BUPIVACAINE HYDROCHLORIDE AND EPINEPHRINE 5; 5 MG/ML; UG/ML
INJECTION, SOLUTION EPIDURAL; INTRACAUDAL; PERINEURAL PRN
Status: DISCONTINUED | OUTPATIENT
Start: 2025-07-01 | End: 2025-07-01 | Stop reason: HOSPADM

## 2025-07-01 RX ORDER — OXYCODONE HYDROCHLORIDE 5 MG/1
10 TABLET ORAL EVERY 4 HOURS PRN
Status: DISCONTINUED | OUTPATIENT
Start: 2025-07-01 | End: 2025-07-09 | Stop reason: HOSPADM

## 2025-07-01 RX ORDER — GLUCAGON 1 MG/ML
1 KIT INJECTION PRN
Status: DISCONTINUED | OUTPATIENT
Start: 2025-07-01 | End: 2025-07-09 | Stop reason: HOSPADM

## 2025-07-01 RX ORDER — MORPHINE SULFATE 2 MG/ML
2 INJECTION, SOLUTION INTRAMUSCULAR; INTRAVENOUS
Status: ACTIVE | OUTPATIENT
Start: 2025-07-01 | End: 2025-07-03

## 2025-07-01 RX ORDER — MEPERIDINE HYDROCHLORIDE 25 MG/ML
12.5 INJECTION INTRAMUSCULAR; INTRAVENOUS; SUBCUTANEOUS EVERY 5 MIN PRN
Status: DISCONTINUED | OUTPATIENT
Start: 2025-07-01 | End: 2025-07-01 | Stop reason: HOSPADM

## 2025-07-01 RX ORDER — DEXTROSE MONOHYDRATE 100 MG/ML
INJECTION, SOLUTION INTRAVENOUS CONTINUOUS PRN
Status: DISCONTINUED | OUTPATIENT
Start: 2025-07-01 | End: 2025-07-09 | Stop reason: HOSPADM

## 2025-07-01 RX ORDER — ONDANSETRON 2 MG/ML
INJECTION INTRAMUSCULAR; INTRAVENOUS
Status: DISCONTINUED | OUTPATIENT
Start: 2025-07-01 | End: 2025-07-01 | Stop reason: SDUPTHER

## 2025-07-01 RX ORDER — MORPHINE SULFATE 4 MG/ML
4 INJECTION, SOLUTION INTRAMUSCULAR; INTRAVENOUS
Status: DISPENSED | OUTPATIENT
Start: 2025-07-01 | End: 2025-07-03

## 2025-07-01 RX ORDER — ROCURONIUM BROMIDE 10 MG/ML
INJECTION, SOLUTION INTRAVENOUS
Status: DISCONTINUED | OUTPATIENT
Start: 2025-07-01 | End: 2025-07-01 | Stop reason: SDUPTHER

## 2025-07-01 RX ORDER — MAGNESIUM HYDROXIDE 1200 MG/15ML
LIQUID ORAL CONTINUOUS PRN
Status: DISCONTINUED | OUTPATIENT
Start: 2025-07-01 | End: 2025-07-01 | Stop reason: HOSPADM

## 2025-07-01 RX ORDER — METOCLOPRAMIDE HYDROCHLORIDE 5 MG/ML
10 INJECTION INTRAMUSCULAR; INTRAVENOUS
Status: DISCONTINUED | OUTPATIENT
Start: 2025-07-01 | End: 2025-07-01 | Stop reason: HOSPADM

## 2025-07-01 RX ORDER — FENTANYL CITRATE 50 UG/ML
INJECTION, SOLUTION INTRAMUSCULAR; INTRAVENOUS
Status: DISCONTINUED | OUTPATIENT
Start: 2025-07-01 | End: 2025-07-01 | Stop reason: SDUPTHER

## 2025-07-01 RX ORDER — ONDANSETRON 4 MG/1
4 TABLET, ORALLY DISINTEGRATING ORAL EVERY 8 HOURS PRN
Status: DISCONTINUED | OUTPATIENT
Start: 2025-07-01 | End: 2025-07-09 | Stop reason: HOSPADM

## 2025-07-01 RX ORDER — PROPOFOL 10 MG/ML
INJECTION, EMULSION INTRAVENOUS
Status: DISCONTINUED | OUTPATIENT
Start: 2025-07-01 | End: 2025-07-01 | Stop reason: SDUPTHER

## 2025-07-01 RX ORDER — OXYCODONE HYDROCHLORIDE 5 MG/1
5 TABLET ORAL EVERY 4 HOURS PRN
Status: DISCONTINUED | OUTPATIENT
Start: 2025-07-01 | End: 2025-07-09 | Stop reason: HOSPADM

## 2025-07-01 RX ORDER — CALCIUM CARBONATE 500 MG/1
500 TABLET, CHEWABLE ORAL DAILY
Status: DISCONTINUED | OUTPATIENT
Start: 2025-07-02 | End: 2025-07-09 | Stop reason: HOSPADM

## 2025-07-01 RX ORDER — GLYCOPYRROLATE 0.2 MG/ML
INJECTION INTRAMUSCULAR; INTRAVENOUS
Status: DISCONTINUED | OUTPATIENT
Start: 2025-07-01 | End: 2025-07-01 | Stop reason: SDUPTHER

## 2025-07-01 RX ORDER — LIDOCAINE HYDROCHLORIDE 20 MG/ML
INJECTION, SOLUTION INTRAVENOUS
Status: DISCONTINUED | OUTPATIENT
Start: 2025-07-01 | End: 2025-07-01 | Stop reason: SDUPTHER

## 2025-07-01 RX ORDER — SODIUM CHLORIDE 0.9 % (FLUSH) 0.9 %
5-40 SYRINGE (ML) INJECTION PRN
Status: DISCONTINUED | OUTPATIENT
Start: 2025-07-01 | End: 2025-07-09 | Stop reason: HOSPADM

## 2025-07-01 RX ORDER — HYDROMORPHONE HYDROCHLORIDE 1 MG/ML
0.5 INJECTION, SOLUTION INTRAMUSCULAR; INTRAVENOUS; SUBCUTANEOUS EVERY 5 MIN PRN
Status: DISCONTINUED | OUTPATIENT
Start: 2025-07-01 | End: 2025-07-01 | Stop reason: HOSPADM

## 2025-07-01 RX ORDER — POLYETHYLENE GLYCOL 3350 17 G/17G
17 POWDER, FOR SOLUTION ORAL DAILY
Status: DISCONTINUED | OUTPATIENT
Start: 2025-07-01 | End: 2025-07-09 | Stop reason: HOSPADM

## 2025-07-01 RX ORDER — METHYLPHENIDATE HYDROCHLORIDE 5 MG/1
5 TABLET ORAL 2 TIMES DAILY
Status: DISCONTINUED | OUTPATIENT
Start: 2025-07-02 | End: 2025-07-09 | Stop reason: HOSPADM

## 2025-07-01 RX ORDER — LEVETIRACETAM 500 MG/5ML
INJECTION, SOLUTION, CONCENTRATE INTRAVENOUS
Status: DISCONTINUED | OUTPATIENT
Start: 2025-07-01 | End: 2025-07-01 | Stop reason: SDUPTHER

## 2025-07-01 RX ORDER — METOPROLOL SUCCINATE 50 MG/1
50 TABLET, EXTENDED RELEASE ORAL DAILY
Status: DISCONTINUED | OUTPATIENT
Start: 2025-07-01 | End: 2025-07-09 | Stop reason: HOSPADM

## 2025-07-01 RX ADMIN — HYDROMORPHONE HYDROCHLORIDE 0.5 MG: 1 INJECTION, SOLUTION INTRAMUSCULAR; INTRAVENOUS; SUBCUTANEOUS at 13:35

## 2025-07-01 RX ADMIN — ONDANSETRON 4 MG: 2 INJECTION, SOLUTION INTRAMUSCULAR; INTRAVENOUS at 13:26

## 2025-07-01 RX ADMIN — WATER 2000 MG: 1 INJECTION INTRAMUSCULAR; INTRAVENOUS; SUBCUTANEOUS at 13:12

## 2025-07-01 RX ADMIN — LEVETIRACETAM 500 MG: 500 INJECTION, SOLUTION, CONCENTRATE INTRAVENOUS at 13:25

## 2025-07-01 RX ADMIN — GLYCOPYRROLATE 0.2 MG: 0.2 INJECTION INTRAMUSCULAR; INTRAVENOUS at 12:59

## 2025-07-01 RX ADMIN — FENTANYL CITRATE 50 MCG: 50 INJECTION, SOLUTION INTRAMUSCULAR; INTRAVENOUS at 12:57

## 2025-07-01 RX ADMIN — SUGAMMADEX 200 MG: 100 INJECTION, SOLUTION INTRAVENOUS at 14:16

## 2025-07-01 RX ADMIN — INSULIN LISPRO 1 UNITS: 100 INJECTION, SOLUTION INTRAVENOUS; SUBCUTANEOUS at 21:12

## 2025-07-01 RX ADMIN — FENTANYL CITRATE 50 MCG: 50 INJECTION, SOLUTION INTRAMUSCULAR; INTRAVENOUS at 13:05

## 2025-07-01 RX ADMIN — PROPOFOL 50 MG: 10 INJECTION, EMULSION INTRAVENOUS at 13:44

## 2025-07-01 RX ADMIN — HYDRALAZINE HYDROCHLORIDE 10 MG: 20 INJECTION INTRAMUSCULAR; INTRAVENOUS at 16:30

## 2025-07-01 RX ADMIN — DEXAMETHASONE SODIUM PHOSPHATE 4 MG: 4 INJECTION INTRA-ARTICULAR; INTRALESIONAL; INTRAMUSCULAR; INTRAVENOUS; SOFT TISSUE at 13:26

## 2025-07-01 RX ADMIN — METHOCARBAMOL 200 MG: 100 INJECTION, SOLUTION INTRAMUSCULAR; INTRAVENOUS at 13:46

## 2025-07-01 RX ADMIN — SODIUM CHLORIDE 900 ML: 9 INJECTION, SOLUTION INTRAVENOUS at 15:05

## 2025-07-01 RX ADMIN — METHOCARBAMOL 200 MG: 100 INJECTION, SOLUTION INTRAMUSCULAR; INTRAVENOUS at 13:32

## 2025-07-01 RX ADMIN — ROCURONIUM BROMIDE 20 MG: 10 INJECTION, SOLUTION INTRAVENOUS at 13:24

## 2025-07-01 RX ADMIN — SODIUM CHLORIDE, SODIUM LACTATE, POTASSIUM CHLORIDE, AND CALCIUM CHLORIDE: .6; .31; .03; .02 INJECTION, SOLUTION INTRAVENOUS at 12:51

## 2025-07-01 RX ADMIN — TRAZODONE HYDROCHLORIDE 100 MG: 100 TABLET ORAL at 21:12

## 2025-07-01 RX ADMIN — METHOCARBAMOL 200 MG: 100 INJECTION, SOLUTION INTRAMUSCULAR; INTRAVENOUS at 13:44

## 2025-07-01 RX ADMIN — METHOCARBAMOL 100 MG: 100 INJECTION, SOLUTION INTRAMUSCULAR; INTRAVENOUS at 13:33

## 2025-07-01 RX ADMIN — PROPOFOL 150 MG: 10 INJECTION, EMULSION INTRAVENOUS at 12:57

## 2025-07-01 RX ADMIN — METHOCARBAMOL 200 MG: 100 INJECTION, SOLUTION INTRAMUSCULAR; INTRAVENOUS at 13:48

## 2025-07-01 RX ADMIN — METHOCARBAMOL 100 MG: 100 INJECTION, SOLUTION INTRAMUSCULAR; INTRAVENOUS at 13:51

## 2025-07-01 RX ADMIN — SODIUM CHLORIDE, PRESERVATIVE FREE 10 ML: 5 INJECTION INTRAVENOUS at 21:13

## 2025-07-01 RX ADMIN — HYDROMORPHONE HYDROCHLORIDE 0.5 MG: 1 INJECTION, SOLUTION INTRAMUSCULAR; INTRAVENOUS; SUBCUTANEOUS at 13:44

## 2025-07-01 RX ADMIN — ROCURONIUM BROMIDE 50 MG: 10 INJECTION, SOLUTION INTRAVENOUS at 12:58

## 2025-07-01 RX ADMIN — OXYCODONE 10 MG: 5 TABLET ORAL at 20:11

## 2025-07-01 RX ADMIN — METOPROLOL SUCCINATE 50 MG: 50 TABLET, EXTENDED RELEASE ORAL at 21:12

## 2025-07-01 RX ADMIN — PANTOPRAZOLE SODIUM 20 MG: 20 TABLET, DELAYED RELEASE ORAL at 21:12

## 2025-07-01 RX ADMIN — MORPHINE SULFATE 4 MG: 4 INJECTION, SOLUTION INTRAMUSCULAR; INTRAVENOUS at 18:22

## 2025-07-01 RX ADMIN — LIDOCAINE HYDROCHLORIDE 100 MG: 20 INJECTION, SOLUTION INTRAVENOUS at 12:57

## 2025-07-01 RX ADMIN — WATER 2000 MG: 1 INJECTION INTRAMUSCULAR; INTRAVENOUS; SUBCUTANEOUS at 21:12

## 2025-07-01 ASSESSMENT — PAIN DESCRIPTION - DESCRIPTORS
DESCRIPTORS: SHARP
DESCRIPTORS: SHARP

## 2025-07-01 ASSESSMENT — PAIN - FUNCTIONAL ASSESSMENT
PAIN_FUNCTIONAL_ASSESSMENT: 0-10
PAIN_FUNCTIONAL_ASSESSMENT: ACTIVITIES ARE NOT PREVENTED
PAIN_FUNCTIONAL_ASSESSMENT: ACTIVITIES ARE NOT PREVENTED

## 2025-07-01 ASSESSMENT — PAIN DESCRIPTION - ORIENTATION
ORIENTATION: RIGHT
ORIENTATION: RIGHT

## 2025-07-01 ASSESSMENT — PAIN SCALES - GENERAL
PAINLEVEL_OUTOF10: 10
PAINLEVEL_OUTOF10: 9
PAINLEVEL_OUTOF10: 3
PAINLEVEL_OUTOF10: 0
PAINLEVEL_OUTOF10: 5

## 2025-07-01 ASSESSMENT — PAIN DESCRIPTION - LOCATION
LOCATION: HEAD
LOCATION: HEAD

## 2025-07-01 ASSESSMENT — PAIN DESCRIPTION - PAIN TYPE
TYPE: SURGICAL PAIN
TYPE: SURGICAL PAIN

## 2025-07-01 ASSESSMENT — PAIN DESCRIPTION - ONSET
ONSET: ON-GOING
ONSET: ON-GOING

## 2025-07-01 ASSESSMENT — PAIN DESCRIPTION - FREQUENCY
FREQUENCY: CONTINUOUS
FREQUENCY: CONTINUOUS

## 2025-07-01 NOTE — ANESTHESIA PRE PROCEDURE
Department of Anesthesiology  Preprocedure Note       Name:  Elio Almodovar   Age:  70 y.o.  :  1955                                          MRN:  [id number]         Date:  2025      Surgeon: Surgeon(s):  Julianna Diaz MD Hobler, Scott C, MD    Procedure: Procedure(s):  RIGHT VENTRICULOPERITONEAL SHUNT PLACEMENT WITH LAPAROSCOPIC ASSIST  .    Medications prior to admission:   Prior to Admission medications    Medication Sig Start Date End Date Taking? Authorizing Provider   insulin glargine (LANTUS) 100 UNIT/ML injection vial Inject 32 Units into the skin daily   Yes Domitila Meek MD   traZODone (DESYREL) 100 MG tablet Take 1 tablet by mouth nightly 25  Yes Domitila Meek MD   calcium carbonate (TUMS) 500 MG chewable tablet Take 1 tablet by mouth daily   Yes Domitila Meek MD   HYDROmorphone (DILAUDID) 2 MG tablet Take 2 tablets by mouth every 4 hours as needed.   Yes Domitila Meek MD   magnesium oxide (MAG-OX) 400 MG tablet Take 1 tablet by mouth daily   Yes Domitila Meek MD   metFORMIN (GLUCOPHAGE) 500 MG tablet Take 1 tablet by mouth 2 times daily (with meals)   Yes Domitila Meek MD   acetaminophen (TYLENOL) 325 MG tablet Take 2 tablets by mouth every 6 hours as needed for Pain   Yes Domitila Meek MD   pantoprazole (PROTONIX) 20 MG tablet Take 1 tablet by mouth in the morning and at bedtime   Yes Domitila Meek MD   insulin regular (NOVOLIN R) 100 UNIT/ML injection Inject into the skin See Admin Instructions   Yes Domitila Meek MD   ondansetron (ZOFRAN) 4 MG tablet Take 1 tablet by mouth every 8 hours as needed for Nausea or Vomiting   Yes Domitila Meek MD   folic acid (FOLVITE) 1 MG tablet Take 1 tablet by mouth daily   Yes Domitila Meek MD   lisinopril (PRINIVIL;ZESTRIL) 20 MG tablet Take 1 tablet by mouth daily 24  Yes Real Mejia MD   metoprolol succinate (TOPROL XL) 50 MG extended release

## 2025-07-01 NOTE — INTERVAL H&P NOTE
Update History & Physical    The patient's History and Physical of June 30, 2025 was reviewed with the patient and I examined the patient. There was no change. The surgical site was confirmed by the patient and me.     Plan: The risks, benefits, expected outcome, and alternative to the recommended procedure have been discussed with the patient. Patient understands and wants to proceed with the procedure.     Electronically signed by CROW GRAJEDA MD on 7/1/2025 at 12:10 PM

## 2025-07-01 NOTE — ANESTHESIA POSTPROCEDURE EVALUATION
Department of Anesthesiology  Postprocedure Note    Patient: Elio Almodovar  MRN: [id number]  YOB: 1955  Date of evaluation: 7/1/2025    Procedure Summary       Date: 07/01/25 Room / Location: [address]    Anesthesia Start: 1251 Anesthesia Stop: 1434    Procedures:       RIGHT VENTRICULOPERITONEAL SHUNT PLACEMENT WITH LAPAROSCOPIC ASSIST (Right)      . (Right) Diagnosis:       (Idiopathic) normal pressure hydrocephalus (HCC)      ((Idiopathic) normal pressure hydrocephalus (HCC) [G91.2])    Surgeons: Julianna Diaz MD Responsible Provider: Mj Angel MD    Anesthesia Type: general ASA Status: 3            Anesthesia Type: No value filed.    Gerardo Phase I: Gerardo Score: 10    Gerardo Phase II:      Anesthesia Post Evaluation    Patient location during evaluation: PACU  Patient participation: complete - patient participated  Level of consciousness: awake  Airway patency: patent  Nausea & Vomiting: no nausea and no vomiting  Cardiovascular status: blood pressure returned to baseline and hemodynamically stable  Respiratory status: acceptable  Hydration status: euvolemic  Multimodal analgesia pain management approach  Pain management: adequate    No notable events documented.

## 2025-07-01 NOTE — OP NOTE
Operative Note      Patient: Elio Almodovar  YOB: 1955  MRN: [id number]    Date of Procedure: 7/1/2025    Pre-Op Diagnosis Codes:      * (Idiopathic) normal pressure hydrocephalus (HCC) [G91.2]    Post-Op Diagnosis: Same       Procedure(s):  RIGHT VENTRICULOPERITONEAL SHUNT PLACEMENT WITH LAPAROSCOPIC ASSIST  .    Surgeon(s):  Julianna Diaz MD Hobler, Scott C, MD    Assistant:   Surgical Assistant: Allan Devlin; Carolina Wong  Resident: Candi Carrera MD    Anesthesia: General    Estimated Blood Loss (mL): Minimal    Complications: None    Specimens:   * No specimens in log *    Implants:  Implant Name Type Inv. Item Serial No.  Lot No. LRB No. Used Action   CODMAN HAKIM PROGRAMMABLE SHUNT SYSTEM    CODMAN_CR [id number] Right 1 Implanted         Drains: * No LDAs found *    Findings:  Infection Present At Time Of Surgery (PATOS) (choose all levels that have infection present):  No infection present  Other Findings: assistance with abdominal access     INDICATIONS:  70 year old male with hydrocephalus. General Surgery assistance was requested for the laparoscopic intraperitoneal portion of the procedure. The risks of surgery were explained to patient pre-operatively.     PROCEDURE DETAILS:  After general anesthesia induction, the patient was prepped and draped in usual fashion. Dr Diaz started by performing her portion of the procedure. This will be dictated separately by her.     After local anesthetic, a 5 mm incision made in the left upper quadrant at Sanchez's point, after which a 5 mm 0 degree laparoscope used to enter the abdomen in direct optiview fashion under visualization. After noting that the abdomen was entered without incident it was insufflated to 15 mm Hg. An additional 5 mm trocar was placed under laparoscopic vision in RUQ.     The catheter was then tunneled subcutaneously by Dr Diaz from the right mid clavicular region superiorly. It was 
the distance to the cranial incision. The dura was coagulated and opened in a cruciate fashion.  The underlying jael was coagulated and incised. The ventricular catheter was then passed into the right frontal horn to the foramen of Lynch and the inner stylet was removed with immediate egress of CSF. The catheter was then secured to the pericranium with 0 silk. The valve was attached to the ventricular catheter and then the peritoneal catheter, each attachment was secured with O silk ties. The valve was then placed in the subgaleal pocket and the excess catheter was pulled through the abdominal incision. The was steady CSF flow from the end of the peritoneal catheter. The peritoneal catheter was then placed in to the abdomen laparoscopically by the general surgery team, the details of this procedure will be dictated separately by Dr. King.    The wound was irrigated copiously with antibiotic impregnated solution. The galea was closed with 2-0 Vicryl sutures.  The skin was closed with staples. The counter incision was closed with 2-0 Vicryl and staples. Telfa and a dry sterile dressing were then applied.  The patient awoke in the operating room and was extubated.  The patient was brought to the recovery room in good condition with stable vital signs. There were no complications. All needle, instrument, and sponge counts were correct.      ESTIMATED BLOOD LOSS: minimal    COMPLICATIONS: No complications apparent.    DISPOSITION: The patient was transferred to the PACU in stable condition.    Dictated by: Julianna Diaz MD

## 2025-07-02 PROBLEM — R45.851 DEPRESSION WITH SUICIDAL IDEATION: Status: ACTIVE | Noted: 2025-07-02

## 2025-07-02 PROBLEM — F32.A DEPRESSION WITH SUICIDAL IDEATION: Status: ACTIVE | Noted: 2025-07-02

## 2025-07-02 LAB
GLUCOSE BLD-MCNC: 183 MG/DL (ref 70–99)
GLUCOSE BLD-MCNC: 202 MG/DL (ref 70–99)
GLUCOSE BLD-MCNC: 215 MG/DL (ref 70–99)
GLUCOSE BLD-MCNC: 247 MG/DL (ref 70–99)
PERFORMED ON: ABNORMAL

## 2025-07-02 PROCEDURE — 6370000000 HC RX 637 (ALT 250 FOR IP): Performed by: INTERNAL MEDICINE

## 2025-07-02 PROCEDURE — APPNB45 APP NON BILLABLE 31-45 MINUTES: Performed by: NURSE PRACTITIONER

## 2025-07-02 PROCEDURE — 97530 THERAPEUTIC ACTIVITIES: CPT

## 2025-07-02 PROCEDURE — 97116 GAIT TRAINING THERAPY: CPT

## 2025-07-02 PROCEDURE — 6360000002 HC RX W HCPCS: Performed by: NURSE PRACTITIONER

## 2025-07-02 PROCEDURE — 97535 SELF CARE MNGMENT TRAINING: CPT

## 2025-07-02 PROCEDURE — 6360000002 HC RX W HCPCS: Performed by: STUDENT IN AN ORGANIZED HEALTH CARE EDUCATION/TRAINING PROGRAM

## 2025-07-02 PROCEDURE — 6370000000 HC RX 637 (ALT 250 FOR IP): Performed by: NURSE PRACTITIONER

## 2025-07-02 PROCEDURE — 94150 VITAL CAPACITY TEST: CPT

## 2025-07-02 PROCEDURE — 99024 POSTOP FOLLOW-UP VISIT: CPT | Performed by: NURSE PRACTITIONER

## 2025-07-02 PROCEDURE — 2500000003 HC RX 250 WO HCPCS: Performed by: STUDENT IN AN ORGANIZED HEALTH CARE EDUCATION/TRAINING PROGRAM

## 2025-07-02 PROCEDURE — 2060000000 HC ICU INTERMEDIATE R&B

## 2025-07-02 PROCEDURE — 97162 PT EVAL MOD COMPLEX 30 MIN: CPT

## 2025-07-02 PROCEDURE — 97166 OT EVAL MOD COMPLEX 45 MIN: CPT

## 2025-07-02 PROCEDURE — 2580000003 HC RX 258: Performed by: NURSE PRACTITIONER

## 2025-07-02 PROCEDURE — 6370000000 HC RX 637 (ALT 250 FOR IP): Performed by: STUDENT IN AN ORGANIZED HEALTH CARE EDUCATION/TRAINING PROGRAM

## 2025-07-02 RX ORDER — INSULIN GLARGINE 100 [IU]/ML
20 INJECTION, SOLUTION SUBCUTANEOUS NIGHTLY
Status: DISCONTINUED | OUTPATIENT
Start: 2025-07-02 | End: 2025-07-09 | Stop reason: HOSPADM

## 2025-07-02 RX ORDER — PRAZOSIN HYDROCHLORIDE 1 MG/1
1 CAPSULE ORAL NIGHTLY
Status: DISCONTINUED | OUTPATIENT
Start: 2025-07-02 | End: 2025-07-09 | Stop reason: HOSPADM

## 2025-07-02 RX ORDER — SENNA AND DOCUSATE SODIUM 50; 8.6 MG/1; MG/1
2 TABLET, FILM COATED ORAL 2 TIMES DAILY
Status: DISCONTINUED | OUTPATIENT
Start: 2025-07-02 | End: 2025-07-09 | Stop reason: HOSPADM

## 2025-07-02 RX ORDER — 0.9 % SODIUM CHLORIDE 0.9 %
500 INTRAVENOUS SOLUTION INTRAVENOUS ONCE
Status: COMPLETED | OUTPATIENT
Start: 2025-07-02 | End: 2025-07-02

## 2025-07-02 RX ORDER — HEPARIN SODIUM 5000 [USP'U]/ML
5000 INJECTION, SOLUTION INTRAVENOUS; SUBCUTANEOUS EVERY 8 HOURS SCHEDULED
Status: DISCONTINUED | OUTPATIENT
Start: 2025-07-02 | End: 2025-07-09 | Stop reason: HOSPADM

## 2025-07-02 RX ADMIN — TRAZODONE HYDROCHLORIDE 100 MG: 100 TABLET ORAL at 20:27

## 2025-07-02 RX ADMIN — ACETAMINOPHEN 650 MG: 325 TABLET ORAL at 17:38

## 2025-07-02 RX ADMIN — ACETAMINOPHEN 650 MG: 325 TABLET ORAL at 00:14

## 2025-07-02 RX ADMIN — METHYLPHENIDATE HYDROCHLORIDE 5 MG: 5 TABLET ORAL at 13:12

## 2025-07-02 RX ADMIN — PANTOPRAZOLE SODIUM 20 MG: 20 TABLET, DELAYED RELEASE ORAL at 20:28

## 2025-07-02 RX ADMIN — MORPHINE SULFATE 4 MG: 4 INJECTION, SOLUTION INTRAMUSCULAR; INTRAVENOUS at 10:43

## 2025-07-02 RX ADMIN — ANTACID TABLETS 500 MG: 500 TABLET, CHEWABLE ORAL at 08:18

## 2025-07-02 RX ADMIN — SODIUM CHLORIDE, PRESERVATIVE FREE 10 ML: 5 INJECTION INTRAVENOUS at 08:20

## 2025-07-02 RX ADMIN — METHYLPHENIDATE HYDROCHLORIDE 5 MG: 5 TABLET ORAL at 08:19

## 2025-07-02 RX ADMIN — INSULIN LISPRO 1 UNITS: 100 INJECTION, SOLUTION INTRAVENOUS; SUBCUTANEOUS at 20:29

## 2025-07-02 RX ADMIN — LISINOPRIL 20 MG: 20 TABLET ORAL at 08:19

## 2025-07-02 RX ADMIN — OXYCODONE 10 MG: 5 TABLET ORAL at 00:15

## 2025-07-02 RX ADMIN — METOPROLOL SUCCINATE 50 MG: 50 TABLET, EXTENDED RELEASE ORAL at 08:19

## 2025-07-02 RX ADMIN — SODIUM CHLORIDE 500 ML: 0.9 INJECTION, SOLUTION INTRAVENOUS at 13:09

## 2025-07-02 RX ADMIN — ACETAMINOPHEN 650 MG: 325 TABLET ORAL at 13:12

## 2025-07-02 RX ADMIN — FLUOXETINE HYDROCHLORIDE 20 MG: 20 CAPSULE ORAL at 13:23

## 2025-07-02 RX ADMIN — HEPARIN SODIUM 5000 UNITS: 5000 INJECTION INTRAVENOUS; SUBCUTANEOUS at 22:00

## 2025-07-02 RX ADMIN — OXYCODONE 10 MG: 5 TABLET ORAL at 08:19

## 2025-07-02 RX ADMIN — INSULIN GLARGINE 20 UNITS: 100 INJECTION, SOLUTION SUBCUTANEOUS at 20:28

## 2025-07-02 RX ADMIN — OXYCODONE 10 MG: 5 TABLET ORAL at 20:28

## 2025-07-02 RX ADMIN — MORPHINE SULFATE 4 MG: 4 INJECTION, SOLUTION INTRAMUSCULAR; INTRAVENOUS at 15:14

## 2025-07-02 RX ADMIN — INSULIN LISPRO 1 UNITS: 100 INJECTION, SOLUTION INTRAVENOUS; SUBCUTANEOUS at 08:19

## 2025-07-02 RX ADMIN — HEPARIN SODIUM 5000 UNITS: 5000 INJECTION INTRAVENOUS; SUBCUTANEOUS at 13:12

## 2025-07-02 RX ADMIN — ACETAMINOPHEN 650 MG: 325 TABLET ORAL at 05:28

## 2025-07-02 RX ADMIN — MORPHINE SULFATE 4 MG: 4 INJECTION, SOLUTION INTRAMUSCULAR; INTRAVENOUS at 17:38

## 2025-07-02 RX ADMIN — WATER 2000 MG: 1 INJECTION INTRAMUSCULAR; INTRAVENOUS; SUBCUTANEOUS at 05:28

## 2025-07-02 RX ADMIN — PANTOPRAZOLE SODIUM 20 MG: 20 TABLET, DELAYED RELEASE ORAL at 08:18

## 2025-07-02 RX ADMIN — INSULIN LISPRO 1 UNITS: 100 INJECTION, SOLUTION INTRAVENOUS; SUBCUTANEOUS at 17:38

## 2025-07-02 RX ADMIN — SENNOSIDES, DOCUSATE SODIUM 2 TABLET: 50; 8.6 TABLET, FILM COATED ORAL at 20:27

## 2025-07-02 ASSESSMENT — PAIN DESCRIPTION - ORIENTATION
ORIENTATION: RIGHT

## 2025-07-02 ASSESSMENT — PAIN SCALES - GENERAL
PAINLEVEL_OUTOF10: 9
PAINLEVEL_OUTOF10: 3
PAINLEVEL_OUTOF10: 0
PAINLEVEL_OUTOF10: 8
PAINLEVEL_OUTOF10: 10
PAINLEVEL_OUTOF10: 8
PAINLEVEL_OUTOF10: 10

## 2025-07-02 ASSESSMENT — PAIN - FUNCTIONAL ASSESSMENT
PAIN_FUNCTIONAL_ASSESSMENT: ACTIVITIES ARE NOT PREVENTED
PAIN_FUNCTIONAL_ASSESSMENT: PREVENTS OR INTERFERES SOME ACTIVE ACTIVITIES AND ADLS
PAIN_FUNCTIONAL_ASSESSMENT: ACTIVITIES ARE NOT PREVENTED

## 2025-07-02 ASSESSMENT — PAIN DESCRIPTION - PAIN TYPE
TYPE: SURGICAL PAIN
TYPE: SURGICAL PAIN;ACUTE PAIN
TYPE: SURGICAL PAIN

## 2025-07-02 ASSESSMENT — PAIN DESCRIPTION - ONSET
ONSET: ON-GOING

## 2025-07-02 ASSESSMENT — PAIN DESCRIPTION - FREQUENCY
FREQUENCY: CONTINUOUS

## 2025-07-02 ASSESSMENT — PAIN DESCRIPTION - DESCRIPTORS
DESCRIPTORS: SHARP
DESCRIPTORS: SHARP
DESCRIPTORS: ACHING
DESCRIPTORS: SHARP

## 2025-07-02 ASSESSMENT — PAIN DESCRIPTION - LOCATION
LOCATION: HEAD
LOCATION: FLANK
LOCATION: HEAD
LOCATION: HEAD

## 2025-07-02 NOTE — VIRTUAL HEALTH
appear stated age. No acute distress.  Behavior/Motor: Psychomotor slowing, engages with interviewer, pleasant and joking at times  Attitude toward examiner: Cooperative, mostly attentive with good eye contact  SI/HI:No active suicidal ideation, no homicidal ideation  Sleep: Difficulty maintaining sleep  Speech: Delayed, slow, low volume, normal tone  Mood: depressed  Affect: Slow, mood congruent, nonlabile  Thought Processes: Slow, possible thought blocking though could be related to medication sedation.  Responses are very latent though linear and logical. No tangentiality or circumstantiality. No flight of ideas or loosening of associations.  Thought Content:  No delusions or other perceptual abnormalities.  Hallucinations: Denies AVOT-H, does not appear to attend to internal stimuli  Cognition: Person, the fact that he is in a hospital setting though he is not oriented to the current hospital, utilize the nursing whiteboard to provide the date  Concentration: distractible  Memory: Fair, though not formally tested.  Insight: fair   Judgement: fair   Fund of Knowledge: adequate      Risk Assessment:  Protective Factors:  Protective: Does not have access to guns, Patient is verbally silverio for safety, No prior suicide attempts, No active substance abuse, Patient has social or family support, Compliant with recommended medications, and Patient is future oriented   Risk Factors:  Risk Factors: Age <25 or >55, Male gender, Depressed mood, Suicide plan, Chronic pain or medical illness, and No outpatient services in place    C-SSRS Score       7/1/2025    10:18 PM   C-SSRS Suicide Screening   1) Within the past month, have you wished you were dead or wished you could go to sleep and not wake up?  Yes   2) Have you actually had any thoughts of killing yourself?  Yes   3) Have you been thinking about how you might kill yourself?  Yes   4) Have you had these thoughts and had some intention of acting on them?  Yes   5)

## 2025-07-02 NOTE — PLAN OF CARE
Problem: Discharge Planning  Goal: Discharge to home or other facility with appropriate resources  Outcome: Progressing  Note: Pt involved in discharge planning. Barriers to discharge discussed with patient. Discharge learning needs identified. Discuss with patient any additional needed resources and transportation plans. Case management following plan of care.       Problem: Safety - Adult  Goal: Free from fall injury  Outcome: Progressing  Note: All fall precautions in place. Bed locked and in lowest position with alarm on. Overbed table and personal belonings within reach. Call light within reach and patient instructed to use call light for assistance. Non-skid socks on.

## 2025-07-02 NOTE — PLAN OF CARE
Problem: Safety - Adult  Goal: Free from fall injury  7/2/2025 0755 by Hilary Bradley, RN  Outcome: Progressing   All fall precautions in place. Bed locked and in lowest position with alarm on. Overbed table and personal belonings within reach. Call light within reach and patient instructed to use call light for assistance. Non-skid socks on.    Problem: Pain  Goal: Verbalizes/displays adequate comfort level or baseline comfort level  7/2/2025 0755 by Hilary Bradley, RN  Outcome: Progressing   Pt endorsing pain to head. Being treated with PRN pain medication, rest, and frequent repositioning with pillow support for comfort and pressure relief. Pt reports some relief from pain with above interventions.

## 2025-07-03 ENCOUNTER — APPOINTMENT (OUTPATIENT)
Dept: CT IMAGING | Age: 70
DRG: 031 | End: 2025-07-03
Attending: STUDENT IN AN ORGANIZED HEALTH CARE EDUCATION/TRAINING PROGRAM
Payer: MEDICARE

## 2025-07-03 ENCOUNTER — APPOINTMENT (OUTPATIENT)
Dept: MRI IMAGING | Age: 70
DRG: 031 | End: 2025-07-03
Attending: STUDENT IN AN ORGANIZED HEALTH CARE EDUCATION/TRAINING PROGRAM
Payer: MEDICARE

## 2025-07-03 LAB
EST. AVERAGE GLUCOSE BLD GHB EST-MCNC: 177.2 MG/DL
GLUCOSE BLD-MCNC: 116 MG/DL (ref 70–99)
GLUCOSE BLD-MCNC: 130 MG/DL (ref 70–99)
GLUCOSE BLD-MCNC: 142 MG/DL (ref 70–99)
GLUCOSE BLD-MCNC: 147 MG/DL (ref 70–99)
HBA1C MFR BLD: 7.8 %
PERFORMED ON: ABNORMAL

## 2025-07-03 PROCEDURE — 6370000000 HC RX 637 (ALT 250 FOR IP): Performed by: NURSE PRACTITIONER

## 2025-07-03 PROCEDURE — 70551 MRI BRAIN STEM W/O DYE: CPT

## 2025-07-03 PROCEDURE — 2060000000 HC ICU INTERMEDIATE R&B

## 2025-07-03 PROCEDURE — 6360000002 HC RX W HCPCS: Performed by: NURSE PRACTITIONER

## 2025-07-03 PROCEDURE — 6370000000 HC RX 637 (ALT 250 FOR IP): Performed by: STUDENT IN AN ORGANIZED HEALTH CARE EDUCATION/TRAINING PROGRAM

## 2025-07-03 PROCEDURE — 70450 CT HEAD/BRAIN W/O DYE: CPT

## 2025-07-03 PROCEDURE — 6360000002 HC RX W HCPCS: Performed by: STUDENT IN AN ORGANIZED HEALTH CARE EDUCATION/TRAINING PROGRAM

## 2025-07-03 RX ORDER — MECOBALAMIN 5000 MCG
10 TABLET,DISINTEGRATING ORAL NIGHTLY
Status: DISCONTINUED | OUTPATIENT
Start: 2025-07-03 | End: 2025-07-09 | Stop reason: HOSPADM

## 2025-07-03 RX ORDER — HYDROMORPHONE HYDROCHLORIDE 1 MG/ML
0.5 INJECTION, SOLUTION INTRAMUSCULAR; INTRAVENOUS; SUBCUTANEOUS ONCE
Status: COMPLETED | OUTPATIENT
Start: 2025-07-03 | End: 2025-07-03

## 2025-07-03 RX ADMIN — ACETAMINOPHEN 650 MG: 325 TABLET ORAL at 18:16

## 2025-07-03 RX ADMIN — OXYCODONE 10 MG: 5 TABLET ORAL at 22:51

## 2025-07-03 RX ADMIN — MORPHINE SULFATE 4 MG: 4 INJECTION, SOLUTION INTRAMUSCULAR; INTRAVENOUS at 13:51

## 2025-07-03 RX ADMIN — ACETAMINOPHEN 650 MG: 325 TABLET ORAL at 07:19

## 2025-07-03 RX ADMIN — INSULIN GLARGINE 20 UNITS: 100 INJECTION, SOLUTION SUBCUTANEOUS at 21:16

## 2025-07-03 RX ADMIN — METHYLPHENIDATE HYDROCHLORIDE 5 MG: 5 TABLET ORAL at 09:52

## 2025-07-03 RX ADMIN — POLYETHYLENE GLYCOL 3350 17 G: 17 POWDER, FOR SOLUTION ORAL at 09:53

## 2025-07-03 RX ADMIN — ANTACID TABLETS 500 MG: 500 TABLET, CHEWABLE ORAL at 09:53

## 2025-07-03 RX ADMIN — PANTOPRAZOLE SODIUM 20 MG: 20 TABLET, DELAYED RELEASE ORAL at 09:53

## 2025-07-03 RX ADMIN — MORPHINE SULFATE 4 MG: 4 INJECTION, SOLUTION INTRAMUSCULAR; INTRAVENOUS at 10:00

## 2025-07-03 RX ADMIN — PRAZOSIN HYDROCHLORIDE 1 MG: 1 CAPSULE ORAL at 00:11

## 2025-07-03 RX ADMIN — ACETAMINOPHEN 650 MG: 325 TABLET ORAL at 22:51

## 2025-07-03 RX ADMIN — Medication 10 MG: at 21:14

## 2025-07-03 RX ADMIN — HEPARIN SODIUM 5000 UNITS: 5000 INJECTION INTRAVENOUS; SUBCUTANEOUS at 21:16

## 2025-07-03 RX ADMIN — PRAZOSIN HYDROCHLORIDE 1 MG: 1 CAPSULE ORAL at 21:18

## 2025-07-03 RX ADMIN — HEPARIN SODIUM 5000 UNITS: 5000 INJECTION INTRAVENOUS; SUBCUTANEOUS at 13:52

## 2025-07-03 RX ADMIN — HEPARIN SODIUM 5000 UNITS: 5000 INJECTION INTRAVENOUS; SUBCUTANEOUS at 07:19

## 2025-07-03 RX ADMIN — HYDROMORPHONE HYDROCHLORIDE 0.5 MG: 1 INJECTION, SOLUTION INTRAMUSCULAR; INTRAVENOUS; SUBCUTANEOUS at 16:14

## 2025-07-03 RX ADMIN — SENNOSIDES, DOCUSATE SODIUM 2 TABLET: 50; 8.6 TABLET, FILM COATED ORAL at 09:52

## 2025-07-03 RX ADMIN — TRAZODONE HYDROCHLORIDE 150 MG: 100 TABLET ORAL at 21:15

## 2025-07-03 RX ADMIN — SENNOSIDES, DOCUSATE SODIUM 2 TABLET: 50; 8.6 TABLET, FILM COATED ORAL at 21:15

## 2025-07-03 RX ADMIN — OXYCODONE 10 MG: 5 TABLET ORAL at 18:16

## 2025-07-03 RX ADMIN — METHYLPHENIDATE HYDROCHLORIDE 5 MG: 5 TABLET ORAL at 13:52

## 2025-07-03 RX ADMIN — OXYCODONE 10 MG: 5 TABLET ORAL at 11:35

## 2025-07-03 RX ADMIN — OXYCODONE 10 MG: 5 TABLET ORAL at 07:22

## 2025-07-03 RX ADMIN — PANTOPRAZOLE SODIUM 20 MG: 20 TABLET, DELAYED RELEASE ORAL at 21:15

## 2025-07-03 RX ADMIN — FLUOXETINE HYDROCHLORIDE 20 MG: 20 CAPSULE ORAL at 09:53

## 2025-07-03 RX ADMIN — ACETAMINOPHEN 650 MG: 325 TABLET ORAL at 11:35

## 2025-07-03 RX ADMIN — ACETAMINOPHEN 650 MG: 325 TABLET ORAL at 00:11

## 2025-07-03 ASSESSMENT — PAIN DESCRIPTION - ORIENTATION
ORIENTATION: RIGHT
ORIENTATION: ANTERIOR
ORIENTATION: RIGHT

## 2025-07-03 ASSESSMENT — PAIN DESCRIPTION - PAIN TYPE
TYPE: SURGICAL PAIN

## 2025-07-03 ASSESSMENT — PAIN - FUNCTIONAL ASSESSMENT
PAIN_FUNCTIONAL_ASSESSMENT: PREVENTS OR INTERFERES SOME ACTIVE ACTIVITIES AND ADLS

## 2025-07-03 ASSESSMENT — PAIN DESCRIPTION - LOCATION
LOCATION: HEAD

## 2025-07-03 ASSESSMENT — PAIN SCALES - GENERAL
PAINLEVEL_OUTOF10: 8
PAINLEVEL_OUTOF10: 10
PAINLEVEL_OUTOF10: 5
PAINLEVEL_OUTOF10: 10
PAINLEVEL_OUTOF10: 8
PAINLEVEL_OUTOF10: 6
PAINLEVEL_OUTOF10: 10
PAINLEVEL_OUTOF10: 8
PAINLEVEL_OUTOF10: 4
PAINLEVEL_OUTOF10: 7
PAINLEVEL_OUTOF10: 7
PAINLEVEL_OUTOF10: 10
PAINLEVEL_OUTOF10: 8
PAINLEVEL_OUTOF10: 0

## 2025-07-03 ASSESSMENT — PAIN DESCRIPTION - DESCRIPTORS
DESCRIPTORS: SHARP
DESCRIPTORS: ACHING
DESCRIPTORS: ACHING
DESCRIPTORS: SHARP
DESCRIPTORS: ACHING;DULL

## 2025-07-03 ASSESSMENT — PAIN DESCRIPTION - FREQUENCY
FREQUENCY: CONTINUOUS

## 2025-07-03 ASSESSMENT — PAIN DESCRIPTION - ONSET
ONSET: ON-GOING

## 2025-07-03 NOTE — PLAN OF CARE
Problem: Discharge Planning  Goal: Discharge to home or other facility with appropriate resources  Outcome: Progressing  Flowsheets (Taken 7/3/2025 1948)  Discharge to home or other facility with appropriate resources:   Identify barriers to discharge with patient and caregiver   Identify discharge learning needs (meds, wound care, etc)   Arrange for needed discharge resources and transportation as appropriate  Note: Pt plans to discharge home with support of family.     Problem: Safety - Adult  Goal: Free from fall injury  7/3/2025 1948 by Lisha Rosenthal RN  Outcome: Progressing  Flowsheets (Taken 7/3/2025 1948)  Free From Fall Injury: Instruct family/caregiver on patient safety  Note: All fall / safety precautions in place. Bed locked in low position, call light within reach.     Problem: Pain  Goal: Verbalizes/displays adequate comfort level or baseline comfort level  7/3/2025 1948 by Lisha Rosenthal RN  Outcome: Progressing  Flowsheets (Taken 7/3/2025 1948)  Verbalizes/displays adequate comfort level or baseline comfort level:   Encourage patient to monitor pain and request assistance   Assess pain using appropriate pain scale   Administer analgesics based on type and severity of pain and evaluate response   Implement non-pharmacological measures as appropriate and evaluate response  Note: Pain managed per MAR.     Problem: Chronic Conditions and Co-morbidities  Goal: Patient's chronic conditions and co-morbidity symptoms are monitored and maintained or improved  Outcome: Progressing

## 2025-07-03 NOTE — PLAN OF CARE
Problem: Safety - Adult  Goal: Free from fall injury  Outcome: Progressing   Pt remains free from injury during this shift. Pt is up x 1 person assist, gait belt and walker. Encourage pt to call for all assistance. Call light is in reach, bed alarm activated for safety, bed locked and in lowest position. Will continue to monitor.    Problem: Pain  Goal: Verbalizes/displays adequate comfort level or baseline comfort level  Outcome: Progressing   Medicated pt per orders, please see eMar. Encourage pt to call if pain is unrelieved. Will continue to monitor.

## 2025-07-03 NOTE — PLAN OF CARE
Problem: Safety - Adult  Goal: Free from fall injury  7/3/2025 1512 by Donna Mai, RN  Outcome: Progressing  7/3/2025 0155 by Monica Becerril RN  Outcome: Progressing   Fall precautions in place. Bed alarm on and in lowest position. Call light, belongings, bedside table within reach.     Problem: Pain  Goal: Verbalizes/displays adequate comfort level or baseline comfort level  7/3/2025 1512 by Donna Mai, RN  Outcome: Progressing  7/3/2025 0155 by Monica Becerril RN  Outcome: Progressing   Assessing pain using numeric pain scale. Managing pain with meds per MAR. Using repositioining and pillow support for comfort.

## 2025-07-04 ENCOUNTER — APPOINTMENT (OUTPATIENT)
Dept: GENERAL RADIOLOGY | Age: 70
DRG: 031 | End: 2025-07-04
Attending: STUDENT IN AN ORGANIZED HEALTH CARE EDUCATION/TRAINING PROGRAM
Payer: MEDICARE

## 2025-07-04 ENCOUNTER — APPOINTMENT (OUTPATIENT)
Dept: CT IMAGING | Age: 70
DRG: 031 | End: 2025-07-04
Attending: STUDENT IN AN ORGANIZED HEALTH CARE EDUCATION/TRAINING PROGRAM
Payer: MEDICARE

## 2025-07-04 LAB
ANION GAP SERPL CALCULATED.3IONS-SCNC: 11 MMOL/L (ref 3–16)
BUN SERPL-MCNC: 27 MG/DL (ref 7–20)
CALCIUM SERPL-MCNC: 9.4 MG/DL (ref 8.3–10.6)
CHLORIDE SERPL-SCNC: 103 MMOL/L (ref 99–110)
CO2 SERPL-SCNC: 23 MMOL/L (ref 21–32)
CREAT SERPL-MCNC: 1.2 MG/DL (ref 0.8–1.3)
GFR SERPLBLD CREATININE-BSD FMLA CKD-EPI: 65 ML/MIN/{1.73_M2}
GLUCOSE BLD-MCNC: 110 MG/DL (ref 70–99)
GLUCOSE BLD-MCNC: 131 MG/DL (ref 70–99)
GLUCOSE BLD-MCNC: 78 MG/DL (ref 70–99)
GLUCOSE BLD-MCNC: 90 MG/DL (ref 70–99)
GLUCOSE SERPL-MCNC: 67 MG/DL (ref 70–99)
PERFORMED ON: ABNORMAL
PERFORMED ON: ABNORMAL
PERFORMED ON: NORMAL
PERFORMED ON: NORMAL
POTASSIUM SERPL-SCNC: 4.1 MMOL/L (ref 3.5–5.1)
SODIUM SERPL-SCNC: 137 MMOL/L (ref 136–145)

## 2025-07-04 PROCEDURE — 71045 X-RAY EXAM CHEST 1 VIEW: CPT

## 2025-07-04 PROCEDURE — 97530 THERAPEUTIC ACTIVITIES: CPT

## 2025-07-04 PROCEDURE — 6370000000 HC RX 637 (ALT 250 FOR IP): Performed by: NURSE PRACTITIONER

## 2025-07-04 PROCEDURE — 2060000000 HC ICU INTERMEDIATE R&B

## 2025-07-04 PROCEDURE — 6360000002 HC RX W HCPCS: Performed by: NURSE PRACTITIONER

## 2025-07-04 PROCEDURE — 6360000002 HC RX W HCPCS

## 2025-07-04 PROCEDURE — 2500000003 HC RX 250 WO HCPCS: Performed by: STUDENT IN AN ORGANIZED HEALTH CARE EDUCATION/TRAINING PROGRAM

## 2025-07-04 PROCEDURE — 36415 COLL VENOUS BLD VENIPUNCTURE: CPT

## 2025-07-04 PROCEDURE — 70498 CT ANGIOGRAPHY NECK: CPT

## 2025-07-04 PROCEDURE — 6360000004 HC RX CONTRAST MEDICATION

## 2025-07-04 PROCEDURE — 6370000000 HC RX 637 (ALT 250 FOR IP): Performed by: STUDENT IN AN ORGANIZED HEALTH CARE EDUCATION/TRAINING PROGRAM

## 2025-07-04 PROCEDURE — 80048 BASIC METABOLIC PNL TOTAL CA: CPT

## 2025-07-04 RX ORDER — IOPAMIDOL 755 MG/ML
75 INJECTION, SOLUTION INTRAVASCULAR
Status: COMPLETED | OUTPATIENT
Start: 2025-07-04 | End: 2025-07-04

## 2025-07-04 RX ORDER — HYDROMORPHONE HYDROCHLORIDE 1 MG/ML
0.5 INJECTION, SOLUTION INTRAMUSCULAR; INTRAVENOUS; SUBCUTANEOUS ONCE
Status: COMPLETED | OUTPATIENT
Start: 2025-07-04 | End: 2025-07-04

## 2025-07-04 RX ORDER — CLONAZEPAM 0.5 MG/1
0.5 TABLET ORAL NIGHTLY
Status: DISCONTINUED | OUTPATIENT
Start: 2025-07-04 | End: 2025-07-05

## 2025-07-04 RX ADMIN — HYDROMORPHONE HYDROCHLORIDE 0.5 MG: 1 INJECTION, SOLUTION INTRAMUSCULAR; INTRAVENOUS; SUBCUTANEOUS at 15:59

## 2025-07-04 RX ADMIN — SODIUM CHLORIDE, PRESERVATIVE FREE 10 ML: 5 INJECTION INTRAVENOUS at 09:01

## 2025-07-04 RX ADMIN — ANTACID TABLETS 500 MG: 500 TABLET, CHEWABLE ORAL at 08:59

## 2025-07-04 RX ADMIN — SENNOSIDES, DOCUSATE SODIUM 2 TABLET: 50; 8.6 TABLET, FILM COATED ORAL at 22:01

## 2025-07-04 RX ADMIN — HEPARIN SODIUM 5000 UNITS: 5000 INJECTION INTRAVENOUS; SUBCUTANEOUS at 13:08

## 2025-07-04 RX ADMIN — METOPROLOL SUCCINATE 50 MG: 50 TABLET, EXTENDED RELEASE ORAL at 09:00

## 2025-07-04 RX ADMIN — HYDROMORPHONE HYDROCHLORIDE 0.5 MG: 1 INJECTION, SOLUTION INTRAMUSCULAR; INTRAVENOUS; SUBCUTANEOUS at 01:01

## 2025-07-04 RX ADMIN — ACETAMINOPHEN 650 MG: 325 TABLET ORAL at 17:56

## 2025-07-04 RX ADMIN — Medication 10 MG: at 22:01

## 2025-07-04 RX ADMIN — OXYCODONE 10 MG: 5 TABLET ORAL at 13:08

## 2025-07-04 RX ADMIN — PANTOPRAZOLE SODIUM 20 MG: 20 TABLET, DELAYED RELEASE ORAL at 08:59

## 2025-07-04 RX ADMIN — POLYETHYLENE GLYCOL 3350 17 G: 17 POWDER, FOR SOLUTION ORAL at 09:00

## 2025-07-04 RX ADMIN — METHYLPHENIDATE HYDROCHLORIDE 5 MG: 5 TABLET ORAL at 13:08

## 2025-07-04 RX ADMIN — FLUOXETINE HYDROCHLORIDE 20 MG: 20 CAPSULE ORAL at 08:59

## 2025-07-04 RX ADMIN — IOPAMIDOL 75 ML: 755 INJECTION, SOLUTION INTRAVENOUS at 13:56

## 2025-07-04 RX ADMIN — INSULIN GLARGINE 20 UNITS: 100 INJECTION, SOLUTION SUBCUTANEOUS at 22:00

## 2025-07-04 RX ADMIN — OXYCODONE 10 MG: 5 TABLET ORAL at 09:09

## 2025-07-04 RX ADMIN — ACETAMINOPHEN 650 MG: 325 TABLET ORAL at 12:16

## 2025-07-04 RX ADMIN — OXYCODONE 10 MG: 5 TABLET ORAL at 03:27

## 2025-07-04 RX ADMIN — HEPARIN SODIUM 5000 UNITS: 5000 INJECTION INTRAVENOUS; SUBCUTANEOUS at 09:00

## 2025-07-04 RX ADMIN — HEPARIN SODIUM 5000 UNITS: 5000 INJECTION INTRAVENOUS; SUBCUTANEOUS at 22:01

## 2025-07-04 RX ADMIN — ACETAMINOPHEN 650 MG: 325 TABLET ORAL at 08:59

## 2025-07-04 RX ADMIN — PRAZOSIN HYDROCHLORIDE 1 MG: 1 CAPSULE ORAL at 22:37

## 2025-07-04 RX ADMIN — METHYLPHENIDATE HYDROCHLORIDE 5 MG: 5 TABLET ORAL at 08:59

## 2025-07-04 RX ADMIN — SENNOSIDES, DOCUSATE SODIUM 2 TABLET: 50; 8.6 TABLET, FILM COATED ORAL at 09:00

## 2025-07-04 RX ADMIN — TRAZODONE HYDROCHLORIDE 150 MG: 100 TABLET ORAL at 22:01

## 2025-07-04 RX ADMIN — CLONAZEPAM 0.5 MG: 0.5 TABLET ORAL at 22:01

## 2025-07-04 RX ADMIN — PANTOPRAZOLE SODIUM 20 MG: 20 TABLET, DELAYED RELEASE ORAL at 22:37

## 2025-07-04 RX ADMIN — OXYCODONE 10 MG: 5 TABLET ORAL at 17:56

## 2025-07-04 ASSESSMENT — PAIN SCALES - GENERAL
PAINLEVEL_OUTOF10: 10
PAINLEVEL_OUTOF10: 4
PAINLEVEL_OUTOF10: 0
PAINLEVEL_OUTOF10: 7
PAINLEVEL_OUTOF10: 6
PAINLEVEL_OUTOF10: 8
PAINLEVEL_OUTOF10: 8
PAINLEVEL_OUTOF10: 7
PAINLEVEL_OUTOF10: 6
PAINLEVEL_OUTOF10: 4
PAINLEVEL_OUTOF10: 8

## 2025-07-04 ASSESSMENT — PAIN DESCRIPTION - PAIN TYPE
TYPE: SURGICAL PAIN

## 2025-07-04 ASSESSMENT — PAIN DESCRIPTION - ORIENTATION
ORIENTATION: RIGHT

## 2025-07-04 ASSESSMENT — PAIN DESCRIPTION - FREQUENCY
FREQUENCY: CONTINUOUS

## 2025-07-04 ASSESSMENT — PAIN DESCRIPTION - DESCRIPTORS
DESCRIPTORS: ACHING
DESCRIPTORS: ACHING
DESCRIPTORS: ACHING;DULL
DESCRIPTORS: ACHING
DESCRIPTORS: ACHING
DESCRIPTORS: ACHING;DULL

## 2025-07-04 ASSESSMENT — PAIN DESCRIPTION - LOCATION
LOCATION: HEAD

## 2025-07-04 ASSESSMENT — PAIN DESCRIPTION - ONSET
ONSET: ON-GOING

## 2025-07-04 NOTE — PLAN OF CARE
Neurosurgery Plan of Care:    Shunt series this AM with valve at 40 per radiology read. Re programmed to original setting of 160.    Honey Green APRN-CNP  Neurology & Neurocritical Care   Neurology Line: [phone]  [employer]: [facility] NPs

## 2025-07-04 NOTE — PLAN OF CARE
Problem: Safety - Adult  Goal: Free from fall injury  Outcome: Progressing   All fall precautions in place and call light is within reach. Fall precautions in place. Bed alarm on and in lowest position. Call light, belongings, bedside table within reach.     Problem: Pain  Goal: Verbalizes/displays adequate comfort level or baseline comfort level  Outcome: Progressing   Assessing pain using numeric pain scale. Managing pain with meds per MAR. Using repositioining and pillow support for comfort.

## 2025-07-05 LAB
ANION GAP SERPL CALCULATED.3IONS-SCNC: 9 MMOL/L (ref 3–16)
BUN SERPL-MCNC: 22 MG/DL (ref 7–20)
CALCIUM SERPL-MCNC: 9.5 MG/DL (ref 8.3–10.6)
CHLORIDE SERPL-SCNC: 101 MMOL/L (ref 99–110)
CO2 SERPL-SCNC: 27 MMOL/L (ref 21–32)
CREAT SERPL-MCNC: 1.1 MG/DL (ref 0.8–1.3)
GFR SERPLBLD CREATININE-BSD FMLA CKD-EPI: 72 ML/MIN/{1.73_M2}
GLUCOSE BLD-MCNC: 104 MG/DL (ref 70–99)
GLUCOSE BLD-MCNC: 108 MG/DL (ref 70–99)
GLUCOSE BLD-MCNC: 159 MG/DL (ref 70–99)
GLUCOSE BLD-MCNC: 231 MG/DL (ref 70–99)
GLUCOSE SERPL-MCNC: 108 MG/DL (ref 70–99)
PERFORMED ON: ABNORMAL
POTASSIUM SERPL-SCNC: 4.3 MMOL/L (ref 3.5–5.1)
SODIUM SERPL-SCNC: 137 MMOL/L (ref 136–145)

## 2025-07-05 PROCEDURE — 99232 SBSQ HOSP IP/OBS MODERATE 35: CPT

## 2025-07-05 PROCEDURE — 97535 SELF CARE MNGMENT TRAINING: CPT

## 2025-07-05 PROCEDURE — 36415 COLL VENOUS BLD VENIPUNCTURE: CPT

## 2025-07-05 PROCEDURE — 80048 BASIC METABOLIC PNL TOTAL CA: CPT

## 2025-07-05 PROCEDURE — 2060000000 HC ICU INTERMEDIATE R&B

## 2025-07-05 PROCEDURE — 6370000000 HC RX 637 (ALT 250 FOR IP): Performed by: NURSE PRACTITIONER

## 2025-07-05 PROCEDURE — 6370000000 HC RX 637 (ALT 250 FOR IP): Performed by: STUDENT IN AN ORGANIZED HEALTH CARE EDUCATION/TRAINING PROGRAM

## 2025-07-05 PROCEDURE — 6360000002 HC RX W HCPCS: Performed by: NURSE PRACTITIONER

## 2025-07-05 PROCEDURE — 6370000000 HC RX 637 (ALT 250 FOR IP)

## 2025-07-05 PROCEDURE — 6370000000 HC RX 637 (ALT 250 FOR IP): Performed by: INTERNAL MEDICINE

## 2025-07-05 PROCEDURE — 2500000003 HC RX 250 WO HCPCS: Performed by: STUDENT IN AN ORGANIZED HEALTH CARE EDUCATION/TRAINING PROGRAM

## 2025-07-05 PROCEDURE — 97530 THERAPEUTIC ACTIVITIES: CPT

## 2025-07-05 RX ORDER — ZOLPIDEM TARTRATE 5 MG/1
5 TABLET ORAL NIGHTLY PRN
Status: DISCONTINUED | OUTPATIENT
Start: 2025-07-05 | End: 2025-07-09 | Stop reason: HOSPADM

## 2025-07-05 RX ORDER — BISACODYL 10 MG
10 SUPPOSITORY, RECTAL RECTAL DAILY PRN
Status: DISCONTINUED | OUTPATIENT
Start: 2025-07-05 | End: 2025-07-09 | Stop reason: HOSPADM

## 2025-07-05 RX ORDER — ATORVASTATIN CALCIUM 40 MG/1
40 TABLET, FILM COATED ORAL NIGHTLY
Status: DISCONTINUED | OUTPATIENT
Start: 2025-07-05 | End: 2025-07-09 | Stop reason: HOSPADM

## 2025-07-05 RX ADMIN — INSULIN LISPRO 1 UNITS: 100 INJECTION, SOLUTION INTRAVENOUS; SUBCUTANEOUS at 12:51

## 2025-07-05 RX ADMIN — SENNOSIDES, DOCUSATE SODIUM 2 TABLET: 50; 8.6 TABLET, FILM COATED ORAL at 21:04

## 2025-07-05 RX ADMIN — HEPARIN SODIUM 5000 UNITS: 5000 INJECTION INTRAVENOUS; SUBCUTANEOUS at 21:04

## 2025-07-05 RX ADMIN — INSULIN GLARGINE 20 UNITS: 100 INJECTION, SOLUTION SUBCUTANEOUS at 21:03

## 2025-07-05 RX ADMIN — SODIUM CHLORIDE, PRESERVATIVE FREE 10 ML: 5 INJECTION INTRAVENOUS at 21:05

## 2025-07-05 RX ADMIN — SENNOSIDES, DOCUSATE SODIUM 2 TABLET: 50; 8.6 TABLET, FILM COATED ORAL at 09:44

## 2025-07-05 RX ADMIN — ACETAMINOPHEN 650 MG: 325 TABLET ORAL at 12:52

## 2025-07-05 RX ADMIN — SODIUM CHLORIDE, PRESERVATIVE FREE 10 ML: 5 INJECTION INTRAVENOUS at 09:45

## 2025-07-05 RX ADMIN — ANTACID TABLETS 500 MG: 500 TABLET, CHEWABLE ORAL at 09:44

## 2025-07-05 RX ADMIN — METOPROLOL SUCCINATE 50 MG: 50 TABLET, EXTENDED RELEASE ORAL at 09:44

## 2025-07-05 RX ADMIN — METHYLPHENIDATE HYDROCHLORIDE 5 MG: 5 TABLET ORAL at 15:55

## 2025-07-05 RX ADMIN — FLUOXETINE HYDROCHLORIDE 20 MG: 20 CAPSULE ORAL at 09:44

## 2025-07-05 RX ADMIN — POLYETHYLENE GLYCOL 3350 17 G: 17 POWDER, FOR SOLUTION ORAL at 09:44

## 2025-07-05 RX ADMIN — ACETAMINOPHEN 650 MG: 325 TABLET ORAL at 04:55

## 2025-07-05 RX ADMIN — ATORVASTATIN CALCIUM 40 MG: 40 TABLET, FILM COATED ORAL at 21:03

## 2025-07-05 RX ADMIN — HEPARIN SODIUM 5000 UNITS: 5000 INJECTION INTRAVENOUS; SUBCUTANEOUS at 15:55

## 2025-07-05 RX ADMIN — HEPARIN SODIUM 5000 UNITS: 5000 INJECTION INTRAVENOUS; SUBCUTANEOUS at 04:57

## 2025-07-05 RX ADMIN — BISACODYL 10 MG: 10 SUPPOSITORY RECTAL at 12:52

## 2025-07-05 RX ADMIN — PANTOPRAZOLE SODIUM 20 MG: 20 TABLET, DELAYED RELEASE ORAL at 09:44

## 2025-07-05 RX ADMIN — METHYLPHENIDATE HYDROCHLORIDE 5 MG: 5 TABLET ORAL at 09:44

## 2025-07-05 RX ADMIN — OXYCODONE 10 MG: 5 TABLET ORAL at 04:55

## 2025-07-05 RX ADMIN — OXYCODONE 10 MG: 5 TABLET ORAL at 09:44

## 2025-07-05 RX ADMIN — ACETAMINOPHEN 650 MG: 325 TABLET ORAL at 18:21

## 2025-07-05 RX ADMIN — PRAZOSIN HYDROCHLORIDE 1 MG: 1 CAPSULE ORAL at 21:08

## 2025-07-05 RX ADMIN — PANTOPRAZOLE SODIUM 20 MG: 20 TABLET, DELAYED RELEASE ORAL at 21:04

## 2025-07-05 RX ADMIN — OXYCODONE 10 MG: 5 TABLET ORAL at 15:55

## 2025-07-05 RX ADMIN — ZOLPIDEM TARTRATE 5 MG: 5 TABLET ORAL at 21:04

## 2025-07-05 ASSESSMENT — PAIN SCALES - GENERAL
PAINLEVEL_OUTOF10: 4
PAINLEVEL_OUTOF10: 8
PAINLEVEL_OUTOF10: 5
PAINLEVEL_OUTOF10: 8
PAINLEVEL_OUTOF10: 7

## 2025-07-05 ASSESSMENT — PAIN DESCRIPTION - PAIN TYPE
TYPE: SURGICAL PAIN
TYPE: SURGICAL PAIN

## 2025-07-05 ASSESSMENT — PAIN DESCRIPTION - ONSET
ONSET: ON-GOING
ONSET: ON-GOING

## 2025-07-05 ASSESSMENT — PAIN DESCRIPTION - DESCRIPTORS
DESCRIPTORS: ACHING
DESCRIPTORS: ACHING;DULL
DESCRIPTORS: ACHING

## 2025-07-05 ASSESSMENT — PAIN DESCRIPTION - ORIENTATION
ORIENTATION: RIGHT
ORIENTATION: RIGHT;POSTERIOR
ORIENTATION: RIGHT;POSTERIOR

## 2025-07-05 ASSESSMENT — PAIN DESCRIPTION - FREQUENCY
FREQUENCY: CONTINUOUS
FREQUENCY: CONTINUOUS

## 2025-07-05 ASSESSMENT — PAIN - FUNCTIONAL ASSESSMENT
PAIN_FUNCTIONAL_ASSESSMENT: PREVENTS OR INTERFERES SOME ACTIVE ACTIVITIES AND ADLS
PAIN_FUNCTIONAL_ASSESSMENT: PREVENTS OR INTERFERES SOME ACTIVE ACTIVITIES AND ADLS
PAIN_FUNCTIONAL_ASSESSMENT: PREVENTS OR INTERFERES WITH MANY ACTIVE NOT PASSIVE ACTIVITIES

## 2025-07-05 ASSESSMENT — PAIN DESCRIPTION - LOCATION
LOCATION: HEAD

## 2025-07-05 NOTE — PLAN OF CARE
Problem: Safety - Adult  Goal: Free from fall injury  Outcome: Progressing  Note: Fall precautions in place. Non-skid socks on. Bed locked in lowest position with alarm on and  rails up. Bedside table, belongings, and call light within reach. Hourly rounding by RN. Floor clean and free from clutter. Room door open. Plan of care continues.      Problem: Pain  Goal: Verbalizes/displays adequate comfort level or baseline comfort level  Outcome: Progressing  Flowsheets (Taken 7/5/2025 1545)  Verbalizes/displays adequate comfort level or baseline comfort level:   Encourage patient to monitor pain and request assistance   Assess pain using appropriate pain scale   Implement non-pharmacological measures as appropriate and evaluate response   Notify Licensed Independent Practitioner if interventions unsuccessful or patient reports new pain  Note: Endorsing HA pain. Using numerical pain rating scale appropriately. PRN pain medication administered per the MAR. Patient educated on pain medications, side effects, and verbalized understanding. Assisted with turning and repositioning for comfort and pressure relief. Notified of pain medication administration schedule. Plan of care continues.      Problem: Chronic Conditions and Co-morbidities  Goal: Patient's chronic conditions and co-morbidity symptoms are monitored and maintained or improved  Note: Glucose checked before meals. Sliding scale Humalog administered per the MAR accordingly. Patient is on a regular, 4 carb diet. Plan of care continues.      Problem: Skin/Tissue Integrity  Goal: Skin integrity remains intact  Description: 1.  Monitor for areas of redness and/or skin breakdown  2.  Assess vascular access sites hourly  3.  Every 4-6 hours minimum:  Change oxygen saturation probe site  4.  Every 4-6 hours:  If on nasal continuous positive airway pressure, respiratory therapy assess nares and determine need for appliance change or resting period  Outcome:

## 2025-07-05 NOTE — PLAN OF CARE
Problem: Discharge Planning  Goal: Discharge to home or other facility with appropriate resources  7/5/2025 0127 by Lisha Rosenthal RN  Outcome: Progressing  Flowsheets (Taken 7/3/2025 1948)  Discharge to home or other facility with appropriate resources:   Identify barriers to discharge with patient and caregiver   Identify discharge learning needs (meds, wound care, etc)   Arrange for needed discharge resources and transportation as appropriate  Note: Pt plans to discharge to rehab facility once medically cleared.     Problem: Safety - Adult  Goal: Free from fall injury  7/5/2025 0127 by Lisha Rosenthal RN  Outcome: Progressing  Flowsheets (Taken 7/3/2025 1948)  Free From Fall Injury: Instruct family/caregiver on patient safety  Note: All fall / safety precautions in place. Bed locked in low position, call light within reach.     Problem: Pain  Goal: Verbalizes/displays adequate comfort level or baseline comfort level  7/5/2025 0127 by Lisha Rosenthal RN  Outcome: Progressing  Flowsheets (Taken 7/3/2025 1948)  Verbalizes/displays adequate comfort level or baseline comfort level:   Encourage patient to monitor pain and request assistance   Assess pain using appropriate pain scale   Administer analgesics based on type and severity of pain and evaluate response   Implement non-pharmacological measures as appropriate and evaluate response  Note: Pain managed per MAR.

## 2025-07-05 NOTE — PLAN OF CARE
Problem: Discharge Planning  Goal: Discharge to home or other facility with appropriate resources  Outcome: Progressing  Flowsheets (Taken 7/3/2025 1948)  Discharge to home or other facility with appropriate resources:   Identify barriers to discharge with patient and caregiver   Identify discharge learning needs (meds, wound care, etc)   Arrange for needed discharge resources and transportation as appropriate  Note: Pt plans to discharge home with support of family.     Problem: Safety - Adult  Goal: Free from fall injury  7/5/2025 0116 by Lisha Rosenthal RN  Outcome: Progressing  Flowsheets (Taken 7/3/2025 1948)  Free From Fall Injury: Instruct family/caregiver on patient safety  Note: All fall / safety precautions in place. Bed locked in low position. Call light within reach.  7/4/2025 1437 by Donna Mai RN  Outcome: Progressing     Problem: Pain  Goal: Verbalizes/displays adequate comfort level or baseline comfort level  7/5/2025 0116 by Lisha Rosenthal RN  Outcome: Progressing  Flowsheets (Taken 7/3/2025 1948)  Verbalizes/displays adequate comfort level or baseline comfort level:   Encourage patient to monitor pain and request assistance   Assess pain using appropriate pain scale   Administer analgesics based on type and severity of pain and evaluate response   Implement non-pharmacological measures as appropriate and evaluate response  Note: Pain managed per MAR.  7/4/2025 1437 by Donna Mai RN  Outcome: Progressing     Problem: Chronic Conditions and Co-morbidities  Goal: Patient's chronic conditions and co-morbidity symptoms are monitored and maintained or improved  Outcome: Progressing

## 2025-07-06 ENCOUNTER — APPOINTMENT (OUTPATIENT)
Dept: CT IMAGING | Age: 70
DRG: 031 | End: 2025-07-06
Attending: STUDENT IN AN ORGANIZED HEALTH CARE EDUCATION/TRAINING PROGRAM
Payer: MEDICARE

## 2025-07-06 LAB
ANION GAP SERPL CALCULATED.3IONS-SCNC: 14 MMOL/L (ref 3–16)
BUN SERPL-MCNC: 21 MG/DL (ref 7–20)
CALCIUM SERPL-MCNC: 9.4 MG/DL (ref 8.3–10.6)
CHLORIDE SERPL-SCNC: 100 MMOL/L (ref 99–110)
CHOLEST SERPL-MCNC: 143 MG/DL (ref 0–199)
CO2 SERPL-SCNC: 23 MMOL/L (ref 21–32)
CREAT SERPL-MCNC: 1.2 MG/DL (ref 0.8–1.3)
GFR SERPLBLD CREATININE-BSD FMLA CKD-EPI: 65 ML/MIN/{1.73_M2}
GLUCOSE BLD-MCNC: 151 MG/DL (ref 70–99)
GLUCOSE BLD-MCNC: 156 MG/DL (ref 70–99)
GLUCOSE BLD-MCNC: 156 MG/DL (ref 70–99)
GLUCOSE BLD-MCNC: 85 MG/DL (ref 70–99)
GLUCOSE BLD-MCNC: 99 MG/DL (ref 70–99)
GLUCOSE SERPL-MCNC: 95 MG/DL (ref 70–99)
HDLC SERPL-MCNC: 46 MG/DL (ref 40–60)
LDLC SERPL CALC-MCNC: 71 MG/DL
PERFORMED ON: ABNORMAL
PERFORMED ON: NORMAL
PERFORMED ON: NORMAL
POTASSIUM SERPL-SCNC: 4.5 MMOL/L (ref 3.5–5.1)
SODIUM SERPL-SCNC: 137 MMOL/L (ref 136–145)
TRIGL SERPL-MCNC: 132 MG/DL (ref 0–150)
VLDLC SERPL CALC-MCNC: 26 MG/DL

## 2025-07-06 PROCEDURE — 6360000002 HC RX W HCPCS: Performed by: NURSE PRACTITIONER

## 2025-07-06 PROCEDURE — 6370000000 HC RX 637 (ALT 250 FOR IP): Performed by: STUDENT IN AN ORGANIZED HEALTH CARE EDUCATION/TRAINING PROGRAM

## 2025-07-06 PROCEDURE — 2500000003 HC RX 250 WO HCPCS: Performed by: STUDENT IN AN ORGANIZED HEALTH CARE EDUCATION/TRAINING PROGRAM

## 2025-07-06 PROCEDURE — 97530 THERAPEUTIC ACTIVITIES: CPT

## 2025-07-06 PROCEDURE — 2060000000 HC ICU INTERMEDIATE R&B

## 2025-07-06 PROCEDURE — 99232 SBSQ HOSP IP/OBS MODERATE 35: CPT

## 2025-07-06 PROCEDURE — 80048 BASIC METABOLIC PNL TOTAL CA: CPT

## 2025-07-06 PROCEDURE — 97110 THERAPEUTIC EXERCISES: CPT

## 2025-07-06 PROCEDURE — 6370000000 HC RX 637 (ALT 250 FOR IP)

## 2025-07-06 PROCEDURE — 70450 CT HEAD/BRAIN W/O DYE: CPT

## 2025-07-06 PROCEDURE — 80061 LIPID PANEL: CPT

## 2025-07-06 PROCEDURE — 97116 GAIT TRAINING THERAPY: CPT

## 2025-07-06 PROCEDURE — 83036 HEMOGLOBIN GLYCOSYLATED A1C: CPT

## 2025-07-06 PROCEDURE — 6370000000 HC RX 637 (ALT 250 FOR IP): Performed by: NURSE PRACTITIONER

## 2025-07-06 RX ORDER — GABAPENTIN 100 MG/1
100 CAPSULE ORAL 3 TIMES DAILY
Status: DISCONTINUED | OUTPATIENT
Start: 2025-07-06 | End: 2025-07-09 | Stop reason: HOSPADM

## 2025-07-06 RX ADMIN — PRAZOSIN HYDROCHLORIDE 1 MG: 1 CAPSULE ORAL at 21:30

## 2025-07-06 RX ADMIN — SENNOSIDES, DOCUSATE SODIUM 2 TABLET: 50; 8.6 TABLET, FILM COATED ORAL at 21:29

## 2025-07-06 RX ADMIN — OXYCODONE 10 MG: 5 TABLET ORAL at 13:08

## 2025-07-06 RX ADMIN — SODIUM CHLORIDE, PRESERVATIVE FREE 10 ML: 5 INJECTION INTRAVENOUS at 21:32

## 2025-07-06 RX ADMIN — ACETAMINOPHEN 650 MG: 325 TABLET ORAL at 17:56

## 2025-07-06 RX ADMIN — OXYCODONE 10 MG: 5 TABLET ORAL at 22:11

## 2025-07-06 RX ADMIN — GABAPENTIN 100 MG: 100 CAPSULE ORAL at 21:29

## 2025-07-06 RX ADMIN — ACETAMINOPHEN 650 MG: 325 TABLET ORAL at 13:08

## 2025-07-06 RX ADMIN — ZOLPIDEM TARTRATE 5 MG: 5 TABLET ORAL at 21:29

## 2025-07-06 RX ADMIN — INSULIN GLARGINE 20 UNITS: 100 INJECTION, SOLUTION SUBCUTANEOUS at 21:30

## 2025-07-06 RX ADMIN — GABAPENTIN 100 MG: 100 CAPSULE ORAL at 17:56

## 2025-07-06 RX ADMIN — HEPARIN SODIUM 5000 UNITS: 5000 INJECTION INTRAVENOUS; SUBCUTANEOUS at 21:29

## 2025-07-06 RX ADMIN — METHYLPHENIDATE HYDROCHLORIDE 5 MG: 5 TABLET ORAL at 14:45

## 2025-07-06 RX ADMIN — PANTOPRAZOLE SODIUM 20 MG: 20 TABLET, DELAYED RELEASE ORAL at 21:29

## 2025-07-06 RX ADMIN — ACETAMINOPHEN 650 MG: 325 TABLET ORAL at 06:33

## 2025-07-06 RX ADMIN — METHYLPHENIDATE HYDROCHLORIDE 5 MG: 5 TABLET ORAL at 08:57

## 2025-07-06 RX ADMIN — HEPARIN SODIUM 5000 UNITS: 5000 INJECTION INTRAVENOUS; SUBCUTANEOUS at 14:45

## 2025-07-06 RX ADMIN — METOPROLOL SUCCINATE 50 MG: 50 TABLET, EXTENDED RELEASE ORAL at 08:57

## 2025-07-06 RX ADMIN — FLUOXETINE HYDROCHLORIDE 20 MG: 20 CAPSULE ORAL at 08:57

## 2025-07-06 RX ADMIN — OXYCODONE 10 MG: 5 TABLET ORAL at 17:56

## 2025-07-06 RX ADMIN — HEPARIN SODIUM 5000 UNITS: 5000 INJECTION INTRAVENOUS; SUBCUTANEOUS at 06:33

## 2025-07-06 RX ADMIN — OXYCODONE 10 MG: 5 TABLET ORAL at 08:57

## 2025-07-06 RX ADMIN — PANTOPRAZOLE SODIUM 20 MG: 20 TABLET, DELAYED RELEASE ORAL at 08:57

## 2025-07-06 RX ADMIN — ATORVASTATIN CALCIUM 40 MG: 40 TABLET, FILM COATED ORAL at 21:29

## 2025-07-06 RX ADMIN — POLYETHYLENE GLYCOL 3350 17 G: 17 POWDER, FOR SOLUTION ORAL at 08:58

## 2025-07-06 RX ADMIN — SODIUM CHLORIDE, PRESERVATIVE FREE 10 ML: 5 INJECTION INTRAVENOUS at 08:58

## 2025-07-06 RX ADMIN — SENNOSIDES, DOCUSATE SODIUM 2 TABLET: 50; 8.6 TABLET, FILM COATED ORAL at 08:57

## 2025-07-06 RX ADMIN — ANTACID TABLETS 500 MG: 500 TABLET, CHEWABLE ORAL at 08:57

## 2025-07-06 ASSESSMENT — PAIN - FUNCTIONAL ASSESSMENT
PAIN_FUNCTIONAL_ASSESSMENT: PREVENTS OR INTERFERES SOME ACTIVE ACTIVITIES AND ADLS

## 2025-07-06 ASSESSMENT — PAIN DESCRIPTION - DESCRIPTORS
DESCRIPTORS: ACHING;POUNDING

## 2025-07-06 ASSESSMENT — PAIN SCALES - GENERAL
PAINLEVEL_OUTOF10: 8
PAINLEVEL_OUTOF10: 10
PAINLEVEL_OUTOF10: 0
PAINLEVEL_OUTOF10: 10
PAINLEVEL_OUTOF10: 5
PAINLEVEL_OUTOF10: 5
PAINLEVEL_OUTOF10: 8
PAINLEVEL_OUTOF10: 5

## 2025-07-06 ASSESSMENT — PAIN DESCRIPTION - FREQUENCY
FREQUENCY: CONTINUOUS

## 2025-07-06 ASSESSMENT — PAIN DESCRIPTION - ORIENTATION
ORIENTATION: RIGHT

## 2025-07-06 ASSESSMENT — PAIN DESCRIPTION - LOCATION
LOCATION: HEAD

## 2025-07-06 ASSESSMENT — PAIN DESCRIPTION - PAIN TYPE
TYPE: SURGICAL PAIN

## 2025-07-06 ASSESSMENT — PAIN DESCRIPTION - ONSET
ONSET: PROGRESSIVE
ONSET: ON-GOING

## 2025-07-06 NOTE — PLAN OF CARE
Problem: Safety - Adult  Goal: Free from fall injury  7/6/2025 1523 by Rita Benitez, RN  Outcome: Progressing  Note: Fall precautions in place. Non-skid socks on. Bed locked in lowest position with alarm on and side rails up. Bedside table, belongings, and call light within reach. Hourly rounding by RN. Floor clean and free from clutter. Room door open. Plan of care continues.      Problem: Pain  Goal: Verbalizes/displays adequate comfort level or baseline comfort level  7/6/2025 1523 by Rita Benitez, RN  Outcome: Progressing  Note: Patient endorsing HA pain to right side of head. Using numerical pain rating scale appropriately. PRN pain medications administered per the MAR. Patient educated on pain medications, side effects, and verbalized understanding. Assisted with turning and repositioning for comfort and pressure relief. Notified of pain medication administrations schedule. Plan of care continues.      Problem: Chronic Conditions and Co-morbidities  Goal: Patient's chronic conditions and co-morbidity symptoms are monitored and maintained or improved  7/6/2025 1523 by Rita Benitez, RN  Outcome: Progressing  Note: Glucose checked before meals. Sliding scale Lispro administered per the MAR accordingly. Patient is on a regular, 4 carb diet. Plan of care continues.      Problem: Nutrition Deficit:  Goal: Optimize nutritional status  7/6/2025 1523 by Rita Benitez, RN  Outcome: Progressing  Note: Patient with decreased appetite. Encouraged to eat at least 50% of meals. Glucerna/Ensure drinks delivered with dinner. Plan of care continues.

## 2025-07-06 NOTE — PLAN OF CARE
Problem: Discharge Planning  Goal: Discharge to home or other facility with appropriate resources  Outcome: Progressing     Problem: Safety - Adult  Goal: Free from fall injury  7/6/2025 0251 by Prachi Peters RN  Outcome: Progressing     Problem: Pain  Goal: Verbalizes/displays adequate comfort level or baseline comfort level  7/6/2025 0251 by Prachi Peters RN  Outcome: Progressing     Problem: Chronic Conditions and Co-morbidities  Goal: Patient's chronic conditions and co-morbidity symptoms are monitored and maintained or improved  7/6/2025 0251 by Prachi Peters RN  Outcome: Progressing     Problem: Self Harm/Suicidality  Goal: Will have no self-injury during hospital stay  Description: INTERVENTIONS:  1.  Ensure constant observer at bedside with Q15M safety checks  2.  Maintain a safe environment  3.  Secure patient belongings  4.  Ensure family/visitors adhere to safety recommendations  5.  Ensure safety tray has been added to patient's diet order  6.  Every shift and PRN: Re-assess suicidal risk via Frequent Screener    Outcome: Progressing     Problem: ABCDS Injury Assessment  Goal: Absence of physical injury  Outcome: Progressing  Flowsheets (Taken 7/5/2025 2100)  Absence of Physical Injury: Implement safety measures based on patient assessment

## 2025-07-07 ENCOUNTER — APPOINTMENT (OUTPATIENT)
Age: 70
DRG: 031 | End: 2025-07-07
Attending: STUDENT IN AN ORGANIZED HEALTH CARE EDUCATION/TRAINING PROGRAM
Payer: MEDICARE

## 2025-07-07 ENCOUNTER — APPOINTMENT (OUTPATIENT)
Dept: CT IMAGING | Age: 70
DRG: 031 | End: 2025-07-07
Attending: STUDENT IN AN ORGANIZED HEALTH CARE EDUCATION/TRAINING PROGRAM
Payer: MEDICARE

## 2025-07-07 LAB
ANION GAP SERPL CALCULATED.3IONS-SCNC: 10 MMOL/L (ref 3–16)
BUN SERPL-MCNC: 17 MG/DL (ref 7–20)
CALCIUM SERPL-MCNC: 9.2 MG/DL (ref 8.3–10.6)
CHLORIDE SERPL-SCNC: 101 MMOL/L (ref 99–110)
CO2 SERPL-SCNC: 25 MMOL/L (ref 21–32)
CREAT SERPL-MCNC: 1.2 MG/DL (ref 0.8–1.3)
ECHO AO ASC DIAM: 3.5 CM
ECHO AO ASCENDING AORTA INDEX: 1.71 CM/M2
ECHO AO ROOT DIAM: 3.4 CM
ECHO AO ROOT INDEX: 1.66 CM/M2
ECHO AV AREA PEAK VELOCITY: 2.7 CM2
ECHO AV AREA VTI: 2.6 CM2
ECHO AV AREA/BSA PEAK VELOCITY: 1.3 CM2/M2
ECHO AV AREA/BSA VTI: 1.3 CM2/M2
ECHO AV MEAN GRADIENT: 4 MMHG
ECHO AV MEAN VELOCITY: 0.9 M/S
ECHO AV PEAK GRADIENT: 8 MMHG
ECHO AV PEAK VELOCITY: 1.4 M/S
ECHO AV VELOCITY RATIO: 0.79
ECHO AV VTI: 37.2 CM
ECHO BSA: 2.09 M2
ECHO EST RA PRESSURE: 3 MMHG
ECHO IVC PROX: 1.3 CM
ECHO LA AREA 2C: 22.4 CM2
ECHO LA AREA 4C: 26.2 CM2
ECHO LA DIAMETER INDEX: 1.9 CM/M2
ECHO LA DIAMETER: 3.9 CM
ECHO LA MAJOR AXIS: 6.8 CM
ECHO LA MINOR AXIS: 6.1 CM
ECHO LA TO AORTIC ROOT RATIO: 1.15
ECHO LA VOL BP: 79 ML (ref 18–58)
ECHO LA VOL MOD A2C: 68 ML (ref 18–58)
ECHO LA VOL MOD A4C: 83 ML (ref 18–58)
ECHO LA VOL/BSA BIPLANE: 39 ML/M2 (ref 16–34)
ECHO LA VOLUME INDEX MOD A2C: 33 ML/M2 (ref 16–34)
ECHO LA VOLUME INDEX MOD A4C: 40 ML/M2 (ref 16–34)
ECHO LV E' LATERAL VELOCITY: 11.7 CM/S
ECHO LV E' SEPTAL VELOCITY: 8.27 CM/S
ECHO LV EDV A2C: 97 ML
ECHO LV EDV A4C: 101 ML
ECHO LV EDV INDEX A4C: 49 ML/M2
ECHO LV EDV NDEX A2C: 47 ML/M2
ECHO LV EF PHYSICIAN: 63 %
ECHO LV EJECTION FRACTION A2C: 60 %
ECHO LV EJECTION FRACTION A4C: 65 %
ECHO LV EJECTION FRACTION BIPLANE: 63 % (ref 55–100)
ECHO LV ESV A2C: 39 ML
ECHO LV ESV A4C: 36 ML
ECHO LV ESV INDEX A2C: 19 ML/M2
ECHO LV ESV INDEX A4C: 18 ML/M2
ECHO LV FRACTIONAL SHORTENING: 35 % (ref 28–44)
ECHO LV INTERNAL DIMENSION DIASTOLE INDEX: 2.34 CM/M2
ECHO LV INTERNAL DIMENSION DIASTOLIC: 4.8 CM (ref 4.2–5.9)
ECHO LV INTERNAL DIMENSION SYSTOLIC INDEX: 1.51 CM/M2
ECHO LV INTERNAL DIMENSION SYSTOLIC: 3.1 CM
ECHO LV IVSD: 1.2 CM (ref 0.6–1)
ECHO LV MASS 2D: 206.4 G (ref 88–224)
ECHO LV MASS INDEX 2D: 100.7 G/M2 (ref 49–115)
ECHO LV POSTERIOR WALL DIASTOLIC: 1.1 CM (ref 0.6–1)
ECHO LV RELATIVE WALL THICKNESS RATIO: 0.46
ECHO LVOT AREA: 3.5 CM2
ECHO LVOT AV VTI INDEX: 0.76
ECHO LVOT DIAM: 2.1 CM
ECHO LVOT MEAN GRADIENT: 2 MMHG
ECHO LVOT PEAK GRADIENT: 5 MMHG
ECHO LVOT PEAK VELOCITY: 1.1 M/S
ECHO LVOT STROKE VOLUME INDEX: 47.6 ML/M2
ECHO LVOT SV: 97.6 ML
ECHO LVOT VTI: 28.2 CM
ECHO MV A VELOCITY: 0.8 M/S
ECHO MV E DECELERATION TIME (DT): 267 MS
ECHO MV E VELOCITY: 0.84 M/S
ECHO MV E/A RATIO: 1.05
ECHO MV E/E' LATERAL: 7.18
ECHO MV E/E' RATIO (AVERAGED): 8.67
ECHO MV E/E' SEPTAL: 10.16
ECHO PV MAX VELOCITY: 0.9 M/S
ECHO PV MEAN GRADIENT: 2 MMHG
ECHO PV MEAN VELOCITY: 0.7 M/S
ECHO PV PEAK GRADIENT: 3 MMHG
ECHO PV VTI: 25.6 CM
ECHO RA AREA 4C: 17.8 CM2
ECHO RA END SYSTOLIC VOLUME APICAL 4 CHAMBER INDEX BSA: 22 ML/M2
ECHO RA VOLUME: 45 ML
ECHO RIGHT VENTRICULAR SYSTOLIC PRESSURE (RVSP): 28 MMHG
ECHO RV BASAL DIMENSION: 2.7 CM
ECHO RV FREE WALL PEAK S': 15.2 CM/S
ECHO RV LONGITUDINAL DIMENSION: 7.1 CM
ECHO RV MID DIMENSION: 2.4 CM
ECHO RV TAPSE: 2.7 CM (ref 1.7–?)
ECHO TV REGURGITANT MAX VELOCITY: 2.49 M/S
ECHO TV REGURGITANT PEAK GRADIENT: 25 MMHG
EST. AVERAGE GLUCOSE BLD GHB EST-MCNC: 168.6 MG/DL
GFR SERPLBLD CREATININE-BSD FMLA CKD-EPI: 65 ML/MIN/{1.73_M2}
GLUCOSE BLD-MCNC: 123 MG/DL (ref 70–99)
GLUCOSE BLD-MCNC: 126 MG/DL (ref 70–99)
GLUCOSE BLD-MCNC: 152 MG/DL (ref 70–99)
GLUCOSE BLD-MCNC: 200 MG/DL (ref 70–99)
GLUCOSE BLD-MCNC: 445 MG/DL (ref 70–99)
GLUCOSE BLD-MCNC: 91 MG/DL (ref 70–99)
GLUCOSE SERPL-MCNC: 166 MG/DL (ref 70–99)
HBA1C MFR BLD: 7.5 %
PERFORMED ON: ABNORMAL
PERFORMED ON: NORMAL
POTASSIUM SERPL-SCNC: 4.4 MMOL/L (ref 3.5–5.1)
SODIUM SERPL-SCNC: 136 MMOL/L (ref 136–145)

## 2025-07-07 PROCEDURE — 6360000004 HC RX CONTRAST MEDICATION

## 2025-07-07 PROCEDURE — 99024 POSTOP FOLLOW-UP VISIT: CPT | Performed by: NURSE PRACTITIONER

## 2025-07-07 PROCEDURE — APPNB45 APP NON BILLABLE 31-45 MINUTES: Performed by: NURSE PRACTITIONER

## 2025-07-07 PROCEDURE — 6360000002 HC RX W HCPCS: Performed by: NURSE PRACTITIONER

## 2025-07-07 PROCEDURE — 93306 TTE W/DOPPLER COMPLETE: CPT | Performed by: INTERNAL MEDICINE

## 2025-07-07 PROCEDURE — 6370000000 HC RX 637 (ALT 250 FOR IP)

## 2025-07-07 PROCEDURE — 36415 COLL VENOUS BLD VENIPUNCTURE: CPT

## 2025-07-07 PROCEDURE — 2060000000 HC ICU INTERMEDIATE R&B

## 2025-07-07 PROCEDURE — 6370000000 HC RX 637 (ALT 250 FOR IP): Performed by: INTERNAL MEDICINE

## 2025-07-07 PROCEDURE — 6370000000 HC RX 637 (ALT 250 FOR IP): Performed by: NURSE PRACTITIONER

## 2025-07-07 PROCEDURE — 6370000000 HC RX 637 (ALT 250 FOR IP): Performed by: STUDENT IN AN ORGANIZED HEALTH CARE EDUCATION/TRAINING PROGRAM

## 2025-07-07 PROCEDURE — 70450 CT HEAD/BRAIN W/O DYE: CPT

## 2025-07-07 PROCEDURE — 2500000003 HC RX 250 WO HCPCS: Performed by: STUDENT IN AN ORGANIZED HEALTH CARE EDUCATION/TRAINING PROGRAM

## 2025-07-07 PROCEDURE — 80048 BASIC METABOLIC PNL TOTAL CA: CPT

## 2025-07-07 PROCEDURE — C8929 TTE W OR WO FOL WCON,DOPPLER: HCPCS

## 2025-07-07 RX ADMIN — METHYLPHENIDATE HYDROCHLORIDE 5 MG: 5 TABLET ORAL at 09:56

## 2025-07-07 RX ADMIN — POLYETHYLENE GLYCOL 3350 17 G: 17 POWDER, FOR SOLUTION ORAL at 09:54

## 2025-07-07 RX ADMIN — OXYCODONE 10 MG: 5 TABLET ORAL at 06:05

## 2025-07-07 RX ADMIN — OXYCODONE 10 MG: 5 TABLET ORAL at 20:23

## 2025-07-07 RX ADMIN — SODIUM CHLORIDE, PRESERVATIVE FREE 10 ML: 5 INJECTION INTRAVENOUS at 09:56

## 2025-07-07 RX ADMIN — METOPROLOL SUCCINATE 50 MG: 50 TABLET, EXTENDED RELEASE ORAL at 09:56

## 2025-07-07 RX ADMIN — PRAZOSIN HYDROCHLORIDE 1 MG: 1 CAPSULE ORAL at 21:42

## 2025-07-07 RX ADMIN — PANTOPRAZOLE SODIUM 20 MG: 20 TABLET, DELAYED RELEASE ORAL at 20:24

## 2025-07-07 RX ADMIN — SENNOSIDES, DOCUSATE SODIUM 2 TABLET: 50; 8.6 TABLET, FILM COATED ORAL at 20:23

## 2025-07-07 RX ADMIN — METHYLPHENIDATE HYDROCHLORIDE 5 MG: 5 TABLET ORAL at 13:11

## 2025-07-07 RX ADMIN — ACETAMINOPHEN 650 MG: 325 TABLET ORAL at 06:03

## 2025-07-07 RX ADMIN — OXYCODONE 10 MG: 5 TABLET ORAL at 15:49

## 2025-07-07 RX ADMIN — ACETAMINOPHEN 650 MG: 325 TABLET ORAL at 01:51

## 2025-07-07 RX ADMIN — PANTOPRAZOLE SODIUM 20 MG: 20 TABLET, DELAYED RELEASE ORAL at 09:56

## 2025-07-07 RX ADMIN — ACETAMINOPHEN 650 MG: 325 TABLET ORAL at 18:29

## 2025-07-07 RX ADMIN — GABAPENTIN 100 MG: 100 CAPSULE ORAL at 09:56

## 2025-07-07 RX ADMIN — SULFUR HEXAFLUORIDE 2 ML: 60.7; .19; .19 INJECTION, POWDER, LYOPHILIZED, FOR SUSPENSION INTRAVENOUS; INTRAVESICAL at 09:21

## 2025-07-07 RX ADMIN — GABAPENTIN 100 MG: 100 CAPSULE ORAL at 20:23

## 2025-07-07 RX ADMIN — HEPARIN SODIUM 5000 UNITS: 5000 INJECTION INTRAVENOUS; SUBCUTANEOUS at 13:11

## 2025-07-07 RX ADMIN — INSULIN GLARGINE 20 UNITS: 100 INJECTION, SOLUTION SUBCUTANEOUS at 21:42

## 2025-07-07 RX ADMIN — ZOLPIDEM TARTRATE 5 MG: 5 TABLET ORAL at 21:42

## 2025-07-07 RX ADMIN — HEPARIN SODIUM 5000 UNITS: 5000 INJECTION INTRAVENOUS; SUBCUTANEOUS at 20:24

## 2025-07-07 RX ADMIN — OXYCODONE 10 MG: 5 TABLET ORAL at 11:22

## 2025-07-07 RX ADMIN — ATORVASTATIN CALCIUM 40 MG: 40 TABLET, FILM COATED ORAL at 20:24

## 2025-07-07 RX ADMIN — ANTACID TABLETS 500 MG: 500 TABLET, CHEWABLE ORAL at 09:56

## 2025-07-07 RX ADMIN — Medication 10 MG: at 20:23

## 2025-07-07 RX ADMIN — SODIUM CHLORIDE, PRESERVATIVE FREE 10 ML: 5 INJECTION INTRAVENOUS at 20:24

## 2025-07-07 RX ADMIN — SENNOSIDES, DOCUSATE SODIUM 2 TABLET: 50; 8.6 TABLET, FILM COATED ORAL at 09:56

## 2025-07-07 RX ADMIN — FLUOXETINE HYDROCHLORIDE 20 MG: 20 CAPSULE ORAL at 09:56

## 2025-07-07 RX ADMIN — INSULIN LISPRO 1 UNITS: 100 INJECTION, SOLUTION INTRAVENOUS; SUBCUTANEOUS at 13:11

## 2025-07-07 RX ADMIN — ACETAMINOPHEN 650 MG: 325 TABLET ORAL at 11:22

## 2025-07-07 RX ADMIN — HEPARIN SODIUM 5000 UNITS: 5000 INJECTION INTRAVENOUS; SUBCUTANEOUS at 06:03

## 2025-07-07 RX ADMIN — GABAPENTIN 100 MG: 100 CAPSULE ORAL at 13:11

## 2025-07-07 ASSESSMENT — PAIN DESCRIPTION - ONSET
ONSET: PROGRESSIVE
ONSET: ON-GOING
ONSET: ON-GOING

## 2025-07-07 ASSESSMENT — PAIN SCALES - GENERAL
PAINLEVEL_OUTOF10: 0
PAINLEVEL_OUTOF10: 10
PAINLEVEL_OUTOF10: 10
PAINLEVEL_OUTOF10: 4
PAINLEVEL_OUTOF10: 8

## 2025-07-07 ASSESSMENT — PAIN DESCRIPTION - FREQUENCY
FREQUENCY: CONTINUOUS

## 2025-07-07 ASSESSMENT — PAIN DESCRIPTION - PAIN TYPE
TYPE: SURGICAL PAIN

## 2025-07-07 ASSESSMENT — PAIN DESCRIPTION - DESCRIPTORS
DESCRIPTORS: SHARP
DESCRIPTORS: SHARP;POUNDING
DESCRIPTORS: ACHING;POUNDING
DESCRIPTORS: SHARP

## 2025-07-07 ASSESSMENT — PAIN DESCRIPTION - ORIENTATION
ORIENTATION: RIGHT
ORIENTATION: ANTERIOR
ORIENTATION: ANTERIOR
ORIENTATION: RIGHT

## 2025-07-07 ASSESSMENT — PAIN DESCRIPTION - LOCATION
LOCATION: HEAD

## 2025-07-07 ASSESSMENT — PAIN - FUNCTIONAL ASSESSMENT
PAIN_FUNCTIONAL_ASSESSMENT: ACTIVITIES ARE NOT PREVENTED
PAIN_FUNCTIONAL_ASSESSMENT: PREVENTS OR INTERFERES SOME ACTIVE ACTIVITIES AND ADLS
PAIN_FUNCTIONAL_ASSESSMENT: ACTIVITIES ARE NOT PREVENTED
PAIN_FUNCTIONAL_ASSESSMENT: ACTIVITIES ARE NOT PREVENTED

## 2025-07-07 NOTE — CONSULTS
V2.0  [place] Consult Note      Name:  Elio Almodovar /Age/Sex: 1955  (70 y.o. male)   MRN & CSN:  [id number] & [id number] Encounter Date/Time: 2025 8:04 PM EDT   Location:  [facility]/5527-01 PCP: Cecil Smith MD     Attending:Julianna Diaz MD  Consulting Provider: Jada Gama MD      Hospital Day: 1    Assessment and Recommendations       Hospital Problems           Last Modified POA    * (Principal) Normal pressure hydrocephalus (HCC) 2025 Yes     Normal pressure hydrocephalus  Reports his balance has been a problem and that's the primary reason he got the surgery  S/p placement of right  shunt, Codman hakim shunt valve set to 160  Perioperative antibiotics cefazolin in place  DVT prophylaxis with SCDs  Oxycodone in place for pain control, morphine for breakthrough pain  No bowel regimen while on opiate pain medications  Stop IV fluids if tolerating p.o. intake    Type 2 diabetes, insulin-dependent  Takes 32 units of glargine at nursing home  Blood glucose mostly in range  For now I will start patient on low-dose sliding scale insulin  Insulin is high risk medication with narrow therapeutic index, reviewed BG trend and adjusted insulin doses  Hypoglycemia protocol in place  Carb-controlled diet    Hypertension okay to continue lisinopril and Toprol-XL    ADHD okay to continue methylphenidate    Chronic insomnia continue trazodone 100 mg nightly      Diet ADULT ORAL NUTRITION SUPPLEMENT; Breakfast, Lunch, Dinner; Standard High Calorie/High Protein Oral Supplement  ADULT DIET; Regular; 3 carb choices (45 gm/meal); Safety Tray; Safety Tray (Disposables)   DVT Prophylaxis [] Lovenox, []  Heparin, [x] SCDs, [] Ambulation,  [] Eliquis, [] Xarelto   Code Status Full Code   Surrogate Decision Maker/ Zeynep Coyle (Spouse)      Personally reviewed Lab Studies and Imaging   Discussed management of the case with RNZENAIDA  Drugs that require monitoring for toxicity include IV morphine and 
Elio Almodovar  7/3/2025  [id number]    Chief Complaint: Normal pressure hydrocephalus (HCC)    Subjective:   This is a 70-year-old male with past medical history including:  Past Medical History:   Diagnosis Date    Cancer (HCC)     PROSTATE    Diabetes mellitus (HCC)     History of alcohol abuse     History of blood transfusion     History of pancreatitis     Hypertension     NPH (normal pressure hydrocephalus) (HCC)      He came into the hospital for planned RIGHT VENTRICULOPERITONEAL SHUNT PLACEMENT WITH LAPAROSCOPIC ASSIST shunt at [facility] on 7/1/25.  The patient is doing very well and feels like he has improvement of his hydrocephalus.  He is scoring 16/17 on AM-PAC.  Patient is interested in going home instead of going to acute rehab unit.    ROS: No CP, SOB, dyspnea    Objective:  Patient Vitals for the past 24 hrs:   BP Temp Temp src Pulse Resp SpO2   07/03/25 1135 -- -- -- -- 16 --   07/03/25 1130 (!) 114/47 97.5 °F (36.4 °C) Oral 57 16 93 %   07/03/25 1000 -- -- -- -- 16 --   07/03/25 0951 (!) 108/51 98.4 °F (36.9 °C) Oral 56 16 97 %   07/03/25 0819 -- -- -- -- 16 --   07/03/25 0719 -- -- -- -- 16 --   07/03/25 0400 (!) 113/59 98.2 °F (36.8 °C) Oral 55 16 95 %   07/03/25 0000 (!) 106/48 98.2 °F (36.8 °C) Oral 51 18 97 %   07/02/25 2015 100/89 98.4 °F (36.9 °C) Oral 57 16 96 %   07/02/25 1808 -- -- -- -- 16 --   07/02/25 1738 -- -- -- -- 16 --   07/02/25 1544 -- -- -- -- 16 --   07/02/25 1514 -- -- -- -- 16 --   07/02/25 1507 107/60 98.1 °F (36.7 °C) Oral 70 16 95 %   07/02/25 1320 (!) 98/55 -- -- -- -- --   07/02/25 1315 (!) 97/51 -- -- -- -- --   07/02/25 1305 (!) 70/61 -- -- -- -- --   07/02/25 1301 (!) 88/69 98.6 °F (37 °C) Oral 61 16 93 %     Gen: No distress, pleasant.   HEENT: Normocephalic, atraumatic.  Staples healing well  CV: No audible murmurs, well perfused extremities  Resp: No respiratory distress. No increased WOB  Abd: Soft, nontender nondistended  Ext: No edema.  4-5 
Across Room: Physical Assistance Required - A Lot  Ascend 3-5 Steps With HR: Physical Assistance Required - A Lot    OT    Eating: Physical Assistance Required - A Little  Grooming: Physical Assistance Required - A Little  LB Dressing: Physical Assistance Required - A Lot  UB Dressing: Physical Assistance Required - A Little  Bathing: Physical Assistance Required - A Lot  Toileting: Physical Assistance Required - A Lot    SLP         Body mass index is 30.59 kg/m².    Assessment:  Patient Active Problem List   Diagnosis    Hydrocephalus (HCC)    Hypertensive emergency    Normal pressure hydrocephalus (HCC)    Depression with suicidal ideation       Plan:     Limited bed availability at the [facility].  - Appropriate for ARU  - Defer placement to case management    Thank you for this consult. Please contact me with any questions or concerns.      William Sims D.O. M.P.H  PM&R  7/7/2025  9:54 AM      * This document was created using dictation software.  While all precautions were taken to ensure accuracy, errors may have occurred.  Please disregard any typographical errors.    
injection SEE ADMIN INSTRUCTIONS    lisinopril (PRINIVIL;ZESTRIL) 20 mg, Oral, DAILY    magnesium oxide (MAG-OX) 400 mg, DAILY    metFORMIN (GLUCOPHAGE) 500 mg, 2 TIMES DAILY WITH MEALS    methylphenidate (RITALIN) 5 mg, Oral, 2 TIMES DAILY    metoprolol succinate (TOPROL XL) 50 mg, Oral, DAILY    Multiple Vitamin (MULTIVITAMIN) tablet 1 tablet    ondansetron (ZOFRAN) 4 mg, EVERY 8 HOURS PRN    pantoprazole (PROTONIX) 20 mg, 2 times daily    traZODone (DESYREL) 100 mg, NIGHTLY    vitamin B-1 (THIAMINE) 100 mg, Oral, DAILY       Physical Exam   PHYSICAL EXAM:  Vitals:    07/04/25 0830 07/04/25 0909 07/04/25 1200 07/04/25 1308   BP: (!) 140/62  (!) 111/50    Pulse: 57  54    Resp: 16 16 16 16   Temp: 97.9 °F (36.6 °C)  97.3 °F (36.3 °C)    TempSrc: Oral  Oral    SpO2: 100%  100%    Weight:       Height:             General: Alert, no distress, well-nourished  Neurologic  Mental status: Oriented to place and year, though he uses written cues in the room to help him.  Is able to name the current president, but then forgot the one before him.  Some delays to responses.  Follows commands without difficulties.    Cranial nerves:   CN 3,4,6: Pupils equal and reactive to light, extraocular muscles intact  CN7: Face symmetric  CN8: Hearing symmetric to spoken voice  CN11: Traps full strength on shoulder shrug    Motor Exam:  B/l deltoid 5/5, FE 5/5  B/l HF 5/5    Deep tendon reflexes:    R  L    Biceps  2+  2+   Triceps      Brachioradialis  2+ 2+   Patellar  1+ 1+   Achilles      Toes       Sensory: light touch intact and symmetric in all 4 extremities.    Cerebellar/coordination: finger nose finger normal without ataxia  Tone: normal in all 4 extremities  Gait: deferred    Diagnostic Testing Results   IMAGES:    7/4/25 shunt series:  IMPRESSION:  1. No evidence of shunt disconnection or kink.      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  [Ventriculo]megaly, with

## 2025-07-07 NOTE — PLAN OF CARE
Problem: Discharge Planning  Goal: Discharge to home or other facility with appropriate resources  Outcome: Progressing     Problem: Safety - Adult  Goal: Free from fall injury  Outcome: Progressing     Problem: Pain  Goal: Verbalizes/displays adequate comfort level or baseline comfort level  Outcome: Progressing     Problem: Chronic Conditions and Co-morbidities  Goal: Patient's chronic conditions and co-morbidity symptoms are monitored and maintained or improved  Outcome: Progressing     Problem: Self Harm/Suicidality  Goal: Will have no self-injury during hospital stay  Description: INTERVENTIONS:  1.  Ensure constant observer at bedside with Q15M safety checks  2.  Maintain a safe environment  3.  Secure patient belongings  4.  Ensure family/visitors adhere to safety recommendations  5.  Ensure safety tray has been added to patient's diet order  6.  Every shift and PRN: Re-assess suicidal risk via Frequent Screener    Outcome: Progressing     Problem: ABCDS Injury Assessment  Goal: Absence of physical injury  Outcome: Progressing  Flowsheets (Taken 7/6/2025 2210)  Absence of Physical Injury: Implement safety measures based on patient assessment     Problem: Skin/Tissue Integrity  Goal: Skin integrity remains intact  Description: 1.  Monitor for areas of redness and/or skin breakdown  2.  Assess vascular access sites hourly  3.  Every 4-6 hours minimum:  Change oxygen saturation probe site  4.  Every 4-6 hours:  If on nasal continuous positive airway pressure, respiratory therapy assess nares and determine need for appliance change or resting period  Outcome: Progressing  Flowsheets (Taken 7/6/2025 2210)  Skin Integrity Remains Intact: Monitor for areas of redness and/or skin breakdown     Problem: Nutrition Deficit:  Goal: Optimize nutritional status  Outcome: Progressing

## 2025-07-07 NOTE — PLAN OF CARE
Problem: Safety - Adult  Goal: Free from fall injury  7/7/2025 1509 by Donna Mai, RN  Outcome: Progressing  7/7/2025 0552 by Prachi Peters RN  Outcome: Progressing   Fall precautions in place. Bed alarm on and in lowest position. Call light, belongings, bedside table within reach.     Problem: Pain  Goal: Verbalizes/displays adequate comfort level or baseline comfort level  7/7/2025 1509 by Donna Mai, RN  Outcome: Progressing  7/7/2025 0552 by Prachi Peters RN  Outcome: Progressing   Assessing pain using numeric pain scale. Managing pain with meds per MAR. Using repositioining and pillow support for comfort.

## 2025-07-08 LAB
GLUCOSE BLD-MCNC: 160 MG/DL (ref 70–99)
GLUCOSE BLD-MCNC: 172 MG/DL (ref 70–99)
GLUCOSE BLD-MCNC: 84 MG/DL (ref 70–99)
GLUCOSE BLD-MCNC: 96 MG/DL (ref 70–99)
PERFORMED ON: ABNORMAL
PERFORMED ON: ABNORMAL
PERFORMED ON: NORMAL
PERFORMED ON: NORMAL

## 2025-07-08 PROCEDURE — 6370000000 HC RX 637 (ALT 250 FOR IP): Performed by: FAMILY MEDICINE

## 2025-07-08 PROCEDURE — 6370000000 HC RX 637 (ALT 250 FOR IP): Performed by: NURSE PRACTITIONER

## 2025-07-08 PROCEDURE — 6360000002 HC RX W HCPCS: Performed by: NURSE PRACTITIONER

## 2025-07-08 PROCEDURE — 2060000000 HC ICU INTERMEDIATE R&B

## 2025-07-08 PROCEDURE — 97535 SELF CARE MNGMENT TRAINING: CPT

## 2025-07-08 PROCEDURE — 99024 POSTOP FOLLOW-UP VISIT: CPT | Performed by: NURSE PRACTITIONER

## 2025-07-08 PROCEDURE — 6370000000 HC RX 637 (ALT 250 FOR IP)

## 2025-07-08 PROCEDURE — APPNB45 APP NON BILLABLE 31-45 MINUTES: Performed by: NURSE PRACTITIONER

## 2025-07-08 PROCEDURE — 2500000003 HC RX 250 WO HCPCS: Performed by: STUDENT IN AN ORGANIZED HEALTH CARE EDUCATION/TRAINING PROGRAM

## 2025-07-08 PROCEDURE — 6370000000 HC RX 637 (ALT 250 FOR IP): Performed by: STUDENT IN AN ORGANIZED HEALTH CARE EDUCATION/TRAINING PROGRAM

## 2025-07-08 RX ORDER — BISACODYL 5 MG/1
10 TABLET, DELAYED RELEASE ORAL ONCE
Status: DISCONTINUED | OUTPATIENT
Start: 2025-07-08 | End: 2025-07-09 | Stop reason: HOSPADM

## 2025-07-08 RX ADMIN — FLUOXETINE HYDROCHLORIDE 20 MG: 20 CAPSULE ORAL at 09:45

## 2025-07-08 RX ADMIN — GABAPENTIN 100 MG: 100 CAPSULE ORAL at 14:30

## 2025-07-08 RX ADMIN — HEPARIN SODIUM 5000 UNITS: 5000 INJECTION INTRAVENOUS; SUBCUTANEOUS at 14:29

## 2025-07-08 RX ADMIN — METOPROLOL SUCCINATE 50 MG: 50 TABLET, EXTENDED RELEASE ORAL at 09:45

## 2025-07-08 RX ADMIN — ACETAMINOPHEN 650 MG: 325 TABLET ORAL at 06:31

## 2025-07-08 RX ADMIN — ZOLPIDEM TARTRATE 5 MG: 5 TABLET ORAL at 21:07

## 2025-07-08 RX ADMIN — ACETAMINOPHEN 650 MG: 325 TABLET ORAL at 12:09

## 2025-07-08 RX ADMIN — SODIUM CHLORIDE, PRESERVATIVE FREE 10 ML: 5 INJECTION INTRAVENOUS at 21:06

## 2025-07-08 RX ADMIN — GABAPENTIN 100 MG: 100 CAPSULE ORAL at 21:07

## 2025-07-08 RX ADMIN — Medication 10 MG: at 21:06

## 2025-07-08 RX ADMIN — INSULIN GLARGINE 20 UNITS: 100 INJECTION, SOLUTION SUBCUTANEOUS at 21:06

## 2025-07-08 RX ADMIN — SODIUM CHLORIDE, PRESERVATIVE FREE 10 ML: 5 INJECTION INTRAVENOUS at 09:46

## 2025-07-08 RX ADMIN — PANTOPRAZOLE SODIUM 20 MG: 20 TABLET, DELAYED RELEASE ORAL at 09:46

## 2025-07-08 RX ADMIN — ATORVASTATIN CALCIUM 40 MG: 40 TABLET, FILM COATED ORAL at 21:07

## 2025-07-08 RX ADMIN — OXYCODONE 10 MG: 5 TABLET ORAL at 17:14

## 2025-07-08 RX ADMIN — POLYETHYLENE GLYCOL 3350 17 G: 17 POWDER, FOR SOLUTION ORAL at 09:46

## 2025-07-08 RX ADMIN — PRAZOSIN HYDROCHLORIDE 1 MG: 1 CAPSULE ORAL at 21:07

## 2025-07-08 RX ADMIN — ACETAMINOPHEN, ASPIRIN, CAFFEINE 1 TABLET: 250; 65; 250 TABLET, FILM COATED ORAL at 10:54

## 2025-07-08 RX ADMIN — OXYCODONE 10 MG: 5 TABLET ORAL at 06:30

## 2025-07-08 RX ADMIN — GABAPENTIN 100 MG: 100 CAPSULE ORAL at 09:45

## 2025-07-08 RX ADMIN — ACETAMINOPHEN 650 MG: 325 TABLET ORAL at 17:14

## 2025-07-08 RX ADMIN — OXYCODONE 10 MG: 5 TABLET ORAL at 21:06

## 2025-07-08 RX ADMIN — HEPARIN SODIUM 5000 UNITS: 5000 INJECTION INTRAVENOUS; SUBCUTANEOUS at 06:32

## 2025-07-08 RX ADMIN — SENNOSIDES, DOCUSATE SODIUM 2 TABLET: 50; 8.6 TABLET, FILM COATED ORAL at 21:06

## 2025-07-08 RX ADMIN — SENNOSIDES, DOCUSATE SODIUM 2 TABLET: 50; 8.6 TABLET, FILM COATED ORAL at 09:45

## 2025-07-08 RX ADMIN — METHYLPHENIDATE HYDROCHLORIDE 5 MG: 5 TABLET ORAL at 14:30

## 2025-07-08 RX ADMIN — ANTACID TABLETS 500 MG: 500 TABLET, CHEWABLE ORAL at 09:46

## 2025-07-08 RX ADMIN — OXYCODONE 10 MG: 5 TABLET ORAL at 12:08

## 2025-07-08 RX ADMIN — PANTOPRAZOLE SODIUM 20 MG: 20 TABLET, DELAYED RELEASE ORAL at 21:07

## 2025-07-08 RX ADMIN — HEPARIN SODIUM 5000 UNITS: 5000 INJECTION INTRAVENOUS; SUBCUTANEOUS at 21:06

## 2025-07-08 RX ADMIN — METHYLPHENIDATE HYDROCHLORIDE 5 MG: 5 TABLET ORAL at 09:45

## 2025-07-08 ASSESSMENT — PAIN DESCRIPTION - ONSET
ONSET: PROGRESSIVE
ONSET: ON-GOING
ONSET: PROGRESSIVE
ONSET: ON-GOING
ONSET: ON-GOING

## 2025-07-08 ASSESSMENT — PAIN SCALES - GENERAL
PAINLEVEL_OUTOF10: 7
PAINLEVEL_OUTOF10: 8
PAINLEVEL_OUTOF10: 0
PAINLEVEL_OUTOF10: 4
PAINLEVEL_OUTOF10: 8
PAINLEVEL_OUTOF10: 5
PAINLEVEL_OUTOF10: 8
PAINLEVEL_OUTOF10: 8
PAINLEVEL_OUTOF10: 0
PAINLEVEL_OUTOF10: 5

## 2025-07-08 ASSESSMENT — PAIN DESCRIPTION - LOCATION
LOCATION: HEAD

## 2025-07-08 ASSESSMENT — PAIN DESCRIPTION - FREQUENCY
FREQUENCY: CONTINUOUS

## 2025-07-08 ASSESSMENT — PAIN DESCRIPTION - ORIENTATION
ORIENTATION: RIGHT

## 2025-07-08 ASSESSMENT — PAIN DESCRIPTION - PAIN TYPE
TYPE: SURGICAL PAIN

## 2025-07-08 ASSESSMENT — PAIN DESCRIPTION - DESCRIPTORS
DESCRIPTORS: SHARP
DESCRIPTORS: SHARP
DESCRIPTORS: SHARP;POUNDING
DESCRIPTORS: SHARP
DESCRIPTORS: SHARP

## 2025-07-08 NOTE — PLAN OF CARE
Problem: Safety - Adult  Goal: Free from fall injury  7/8/2025 1829 by Donna Mai, RN  Outcome: Progressing  7/8/2025 0531 by Prachi Peters RN  Outcome: Progressing   Fall precautions in place. Bed alarm on and in lowest position. Call light, belongings, bedside table within reach.     Problem: Pain  Goal: Verbalizes/displays adequate comfort level or baseline comfort level  7/8/2025 1829 by Donna Mai, RN  Outcome: Progressing  7/8/2025 0531 by Prachi Peters RN  Outcome: Progressing   Assessing pain using numeric pain scale. Managing pain with meds per MAR. Using repositioining and pillow support for comfort.

## 2025-07-08 NOTE — PLAN OF CARE
Problem: Discharge Planning  Goal: Discharge to home or other facility with appropriate resources  Outcome: Progressing     Problem: Safety - Adult  Goal: Free from fall injury  Outcome: Progressing     Problem: Pain  Goal: Verbalizes/displays adequate comfort level or baseline comfort level  Outcome: Progressing     Problem: Chronic Conditions and Co-morbidities  Goal: Patient's chronic conditions and co-morbidity symptoms are monitored and maintained or improved  Outcome: Progressing     Problem: Self Harm/Suicidality  Goal: Will have no self-injury during hospital stay  Description: INTERVENTIONS:  1.  Ensure constant observer at bedside with Q15M safety checks  2.  Maintain a safe environment  3.  Secure patient belongings  4.  Ensure family/visitors adhere to safety recommendations  5.  Ensure safety tray has been added to patient's diet order  6.  Every shift and PRN: Re-assess suicidal risk via Frequent Screener    Outcome: Progressing     Problem: ABCDS Injury Assessment  Goal: Absence of physical injury  Outcome: Progressing  Flowsheets (Taken 7/7/2025 2023)  Absence of Physical Injury: Implement safety measures based on patient assessment     Problem: Skin/Tissue Integrity  Goal: Skin integrity remains intact  Description: 1.  Monitor for areas of redness and/or skin breakdown  2.  Assess vascular access sites hourly  3.  Every 4-6 hours minimum:  Change oxygen saturation probe site  4.  Every 4-6 hours:  If on nasal continuous positive airway pressure, respiratory therapy assess nares and determine need for appliance change or resting period  Outcome: Progressing  Flowsheets (Taken 7/7/2025 2023)  Skin Integrity Remains Intact: Monitor for areas of redness and/or skin breakdown     Problem: Nutrition Deficit:  Goal: Optimize nutritional status  Outcome: Progressing     Problem: Neurosensory - Adult  Goal: Achieves stable or improved neurological status  Outcome: Progressing  Goal: Achieves maximal

## 2025-07-09 ENCOUNTER — HOSPITAL ENCOUNTER (INPATIENT)
Age: 70
LOS: 15 days | Discharge: HOME HEALTH | DRG: 057 | End: 2025-07-24
Attending: PHYSICAL MEDICINE & REHABILITATION | Admitting: PHYSICAL MEDICINE & REHABILITATION
Payer: MEDICARE

## 2025-07-09 VITALS
OXYGEN SATURATION: 97 % | BODY MASS INDEX: 30.48 KG/M2 | TEMPERATURE: 97.4 F | DIASTOLIC BLOOD PRESSURE: 55 MMHG | HEART RATE: 56 BPM | SYSTOLIC BLOOD PRESSURE: 115 MMHG | RESPIRATION RATE: 16 BRPM | WEIGHT: 201.12 LBS | HEIGHT: 68 IN

## 2025-07-09 DIAGNOSIS — I63.9 CEREBROVASCULAR ACCIDENT (CVA), UNSPECIFIED MECHANISM (HCC): Primary | ICD-10-CM

## 2025-07-09 DIAGNOSIS — G91.2 NORMAL PRESSURE HYDROCEPHALUS (HCC): ICD-10-CM

## 2025-07-09 DIAGNOSIS — Z98.2 S/P VP SHUNT: ICD-10-CM

## 2025-07-09 PROBLEM — F32.A DEPRESSION WITH SUICIDAL IDEATION: Status: RESOLVED | Noted: 2025-07-02 | Resolved: 2025-07-09

## 2025-07-09 PROBLEM — R45.851 DEPRESSION WITH SUICIDAL IDEATION: Status: RESOLVED | Noted: 2025-07-02 | Resolved: 2025-07-09

## 2025-07-09 LAB
ANION GAP SERPL CALCULATED.3IONS-SCNC: 9 MMOL/L (ref 3–16)
BASOPHILS # BLD: 0 K/UL (ref 0–0.2)
BASOPHILS NFR BLD: 0.4 %
BUN SERPL-MCNC: 22 MG/DL (ref 7–20)
CALCIUM SERPL-MCNC: 9 MG/DL (ref 8.3–10.6)
CHLORIDE SERPL-SCNC: 100 MMOL/L (ref 99–110)
CO2 SERPL-SCNC: 26 MMOL/L (ref 21–32)
CREAT SERPL-MCNC: 1.2 MG/DL (ref 0.8–1.3)
DEPRECATED RDW RBC AUTO: 14 % (ref 12.4–15.4)
EOSINOPHIL # BLD: 0.1 K/UL (ref 0–0.6)
EOSINOPHIL NFR BLD: 1.6 %
GFR SERPLBLD CREATININE-BSD FMLA CKD-EPI: 65 ML/MIN/{1.73_M2}
GLUCOSE BLD-MCNC: 110 MG/DL (ref 70–99)
GLUCOSE BLD-MCNC: 140 MG/DL (ref 70–99)
GLUCOSE BLD-MCNC: 244 MG/DL (ref 70–99)
GLUCOSE SERPL-MCNC: 236 MG/DL (ref 70–99)
HCT VFR BLD AUTO: 35.8 % (ref 40.5–52.5)
HGB BLD-MCNC: 12.1 G/DL (ref 13.5–17.5)
LYMPHOCYTES # BLD: 1.6 K/UL (ref 1–5.1)
LYMPHOCYTES NFR BLD: 22.1 %
MCH RBC QN AUTO: 30.7 PG (ref 26–34)
MCHC RBC AUTO-ENTMCNC: 33.9 G/DL (ref 31–36)
MCV RBC AUTO: 90.6 FL (ref 80–100)
MONOCYTES # BLD: 0.5 K/UL (ref 0–1.3)
MONOCYTES NFR BLD: 6.9 %
NEUTROPHILS # BLD: 5.1 K/UL (ref 1.7–7.7)
NEUTROPHILS NFR BLD: 69 %
PERFORMED ON: ABNORMAL
PLATELET # BLD AUTO: 146 K/UL (ref 135–450)
PMV BLD AUTO: 8.7 FL (ref 5–10.5)
POTASSIUM SERPL-SCNC: 4.5 MMOL/L (ref 3.5–5.1)
RBC # BLD AUTO: 3.95 M/UL (ref 4.2–5.9)
SODIUM SERPL-SCNC: 135 MMOL/L (ref 136–145)
WBC # BLD AUTO: 7.4 K/UL (ref 4–11)

## 2025-07-09 PROCEDURE — 6370000000 HC RX 637 (ALT 250 FOR IP): Performed by: PHYSICAL MEDICINE & REHABILITATION

## 2025-07-09 PROCEDURE — 94150 VITAL CAPACITY TEST: CPT

## 2025-07-09 PROCEDURE — 2500000003 HC RX 250 WO HCPCS: Performed by: STUDENT IN AN ORGANIZED HEALTH CARE EDUCATION/TRAINING PROGRAM

## 2025-07-09 PROCEDURE — 6360000002 HC RX W HCPCS: Performed by: PHYSICAL MEDICINE & REHABILITATION

## 2025-07-09 PROCEDURE — 1280000000 HC REHAB R&B

## 2025-07-09 PROCEDURE — 36415 COLL VENOUS BLD VENIPUNCTURE: CPT

## 2025-07-09 PROCEDURE — 6370000000 HC RX 637 (ALT 250 FOR IP): Performed by: INTERNAL MEDICINE

## 2025-07-09 PROCEDURE — 6370000000 HC RX 637 (ALT 250 FOR IP): Performed by: NURSE PRACTITIONER

## 2025-07-09 PROCEDURE — 80048 BASIC METABOLIC PNL TOTAL CA: CPT

## 2025-07-09 PROCEDURE — 6370000000 HC RX 637 (ALT 250 FOR IP)

## 2025-07-09 PROCEDURE — 85025 COMPLETE CBC W/AUTO DIFF WBC: CPT

## 2025-07-09 PROCEDURE — 6370000000 HC RX 637 (ALT 250 FOR IP): Performed by: STUDENT IN AN ORGANIZED HEALTH CARE EDUCATION/TRAINING PROGRAM

## 2025-07-09 PROCEDURE — 6360000002 HC RX W HCPCS: Performed by: NURSE PRACTITIONER

## 2025-07-09 RX ORDER — POLYETHYLENE GLYCOL 3350 17 G/17G
17 POWDER, FOR SOLUTION ORAL DAILY PRN
Status: DISCONTINUED | OUTPATIENT
Start: 2025-07-09 | End: 2025-07-24 | Stop reason: HOSPADM

## 2025-07-09 RX ORDER — OXYCODONE HYDROCHLORIDE 5 MG/1
5 TABLET ORAL EVERY 4 HOURS PRN
Status: CANCELLED | OUTPATIENT
Start: 2025-07-09

## 2025-07-09 RX ORDER — INSULIN GLARGINE 100 [IU]/ML
20 INJECTION, SOLUTION SUBCUTANEOUS NIGHTLY
Status: DISCONTINUED | OUTPATIENT
Start: 2025-07-09 | End: 2025-07-11

## 2025-07-09 RX ORDER — PANTOPRAZOLE SODIUM 20 MG/1
20 TABLET, DELAYED RELEASE ORAL 2 TIMES DAILY
Status: CANCELLED | OUTPATIENT
Start: 2025-07-09

## 2025-07-09 RX ORDER — SENNA AND DOCUSATE SODIUM 50; 8.6 MG/1; MG/1
1 TABLET, FILM COATED ORAL 2 TIMES DAILY
Status: CANCELLED | OUTPATIENT
Start: 2025-07-09

## 2025-07-09 RX ORDER — OXYCODONE HYDROCHLORIDE 5 MG/1
10 TABLET ORAL EVERY 4 HOURS PRN
Status: CANCELLED | OUTPATIENT
Start: 2025-07-09

## 2025-07-09 RX ORDER — INSULIN LISPRO 100 [IU]/ML
0-4 INJECTION, SOLUTION INTRAVENOUS; SUBCUTANEOUS
Status: CANCELLED | OUTPATIENT
Start: 2025-07-09

## 2025-07-09 RX ORDER — METHYLPHENIDATE HYDROCHLORIDE 5 MG/1
5 TABLET ORAL 2 TIMES DAILY
Status: CANCELLED | OUTPATIENT
Start: 2025-07-10

## 2025-07-09 RX ORDER — ONDANSETRON 2 MG/ML
4 INJECTION INTRAMUSCULAR; INTRAVENOUS EVERY 6 HOURS PRN
Status: CANCELLED | OUTPATIENT
Start: 2025-07-09

## 2025-07-09 RX ORDER — GABAPENTIN 100 MG/1
100 CAPSULE ORAL 3 TIMES DAILY
Status: DISCONTINUED | OUTPATIENT
Start: 2025-07-09 | End: 2025-07-24 | Stop reason: HOSPADM

## 2025-07-09 RX ORDER — OXYCODONE HYDROCHLORIDE 10 MG/1
10 TABLET ORAL EVERY 4 HOURS PRN
Refills: 0 | Status: DISCONTINUED | OUTPATIENT
Start: 2025-07-09 | End: 2025-07-24 | Stop reason: HOSPADM

## 2025-07-09 RX ORDER — ONDANSETRON 2 MG/ML
4 INJECTION INTRAMUSCULAR; INTRAVENOUS EVERY 6 HOURS PRN
Status: DISCONTINUED | OUTPATIENT
Start: 2025-07-09 | End: 2025-07-24 | Stop reason: HOSPADM

## 2025-07-09 RX ORDER — GLUCAGON 1 MG/ML
1 KIT INJECTION PRN
Status: DISCONTINUED | OUTPATIENT
Start: 2025-07-09 | End: 2025-07-24 | Stop reason: HOSPADM

## 2025-07-09 RX ORDER — ZOLPIDEM TARTRATE 5 MG/1
5 TABLET ORAL NIGHTLY PRN
Status: CANCELLED | OUTPATIENT
Start: 2025-07-09

## 2025-07-09 RX ORDER — HEPARIN SODIUM 5000 [USP'U]/ML
5000 INJECTION, SOLUTION INTRAVENOUS; SUBCUTANEOUS EVERY 8 HOURS SCHEDULED
Status: DISCONTINUED | OUTPATIENT
Start: 2025-07-09 | End: 2025-07-24 | Stop reason: HOSPADM

## 2025-07-09 RX ORDER — INSULIN GLARGINE 100 [IU]/ML
20 INJECTION, SOLUTION SUBCUTANEOUS NIGHTLY
Status: CANCELLED | OUTPATIENT
Start: 2025-07-09

## 2025-07-09 RX ORDER — POLYETHYLENE GLYCOL 3350 17 G/17G
17 POWDER, FOR SOLUTION ORAL DAILY PRN
Status: CANCELLED | OUTPATIENT
Start: 2025-07-09

## 2025-07-09 RX ORDER — PRAZOSIN HYDROCHLORIDE 1 MG/1
1 CAPSULE ORAL NIGHTLY
Status: DISCONTINUED | OUTPATIENT
Start: 2025-07-09 | End: 2025-07-24 | Stop reason: HOSPADM

## 2025-07-09 RX ORDER — ATORVASTATIN CALCIUM 40 MG/1
40 TABLET, FILM COATED ORAL NIGHTLY
Status: CANCELLED | OUTPATIENT
Start: 2025-07-09

## 2025-07-09 RX ORDER — INSULIN LISPRO 100 [IU]/ML
0-4 INJECTION, SOLUTION INTRAVENOUS; SUBCUTANEOUS
Status: DISCONTINUED | OUTPATIENT
Start: 2025-07-09 | End: 2025-07-24 | Stop reason: HOSPADM

## 2025-07-09 RX ORDER — HEPARIN SODIUM 5000 [USP'U]/ML
5000 INJECTION, SOLUTION INTRAVENOUS; SUBCUTANEOUS EVERY 8 HOURS SCHEDULED
Qty: 30 ML | Refills: 0 | Status: ON HOLD
Start: 2025-07-09 | End: 2025-07-19

## 2025-07-09 RX ORDER — PRAZOSIN HYDROCHLORIDE 1 MG/1
1 CAPSULE ORAL NIGHTLY
Status: CANCELLED | OUTPATIENT
Start: 2025-07-09

## 2025-07-09 RX ORDER — METOPROLOL SUCCINATE 50 MG/1
50 TABLET, EXTENDED RELEASE ORAL DAILY
Status: CANCELLED | OUTPATIENT
Start: 2025-07-10

## 2025-07-09 RX ORDER — POLYETHYLENE GLYCOL 3350 17 G/17G
17 POWDER, FOR SOLUTION ORAL DAILY
Status: ON HOLD | COMMUNITY
Start: 2025-07-10 | End: 2025-08-09

## 2025-07-09 RX ORDER — PRAZOSIN HYDROCHLORIDE 1 MG/1
1 CAPSULE ORAL NIGHTLY
Qty: 30 CAPSULE | Refills: 3 | Status: ON HOLD
Start: 2025-07-09

## 2025-07-09 RX ORDER — ZOLPIDEM TARTRATE 5 MG/1
5 TABLET ORAL NIGHTLY PRN
Status: DISCONTINUED | OUTPATIENT
Start: 2025-07-09 | End: 2025-07-18

## 2025-07-09 RX ORDER — ACETAMINOPHEN 325 MG/1
650 TABLET ORAL EVERY 6 HOURS PRN
Status: CANCELLED | OUTPATIENT
Start: 2025-07-09

## 2025-07-09 RX ORDER — ONDANSETRON 4 MG/1
4 TABLET, ORALLY DISINTEGRATING ORAL EVERY 8 HOURS PRN
Status: DISCONTINUED | OUTPATIENT
Start: 2025-07-09 | End: 2025-07-24 | Stop reason: HOSPADM

## 2025-07-09 RX ORDER — SENNA AND DOCUSATE SODIUM 50; 8.6 MG/1; MG/1
2 TABLET, FILM COATED ORAL 2 TIMES DAILY
Status: ON HOLD | COMMUNITY
Start: 2025-07-09

## 2025-07-09 RX ORDER — BISACODYL 10 MG
10 SUPPOSITORY, RECTAL RECTAL DAILY PRN
Status: DISCONTINUED | OUTPATIENT
Start: 2025-07-09 | End: 2025-07-24 | Stop reason: HOSPADM

## 2025-07-09 RX ORDER — PANTOPRAZOLE SODIUM 20 MG/1
20 TABLET, DELAYED RELEASE ORAL 2 TIMES DAILY
Status: DISCONTINUED | OUTPATIENT
Start: 2025-07-09 | End: 2025-07-24 | Stop reason: HOSPADM

## 2025-07-09 RX ORDER — GABAPENTIN 100 MG/1
100 CAPSULE ORAL 3 TIMES DAILY
Status: CANCELLED | OUTPATIENT
Start: 2025-07-09

## 2025-07-09 RX ORDER — SENNA AND DOCUSATE SODIUM 50; 8.6 MG/1; MG/1
1 TABLET, FILM COATED ORAL 2 TIMES DAILY
Status: DISCONTINUED | OUTPATIENT
Start: 2025-07-09 | End: 2025-07-24 | Stop reason: HOSPADM

## 2025-07-09 RX ORDER — DEXTROSE MONOHYDRATE 100 MG/ML
INJECTION, SOLUTION INTRAVENOUS CONTINUOUS PRN
Status: CANCELLED | OUTPATIENT
Start: 2025-07-09

## 2025-07-09 RX ORDER — DEXTROSE MONOHYDRATE 100 MG/ML
INJECTION, SOLUTION INTRAVENOUS CONTINUOUS PRN
Status: DISCONTINUED | OUTPATIENT
Start: 2025-07-09 | End: 2025-07-24 | Stop reason: HOSPADM

## 2025-07-09 RX ORDER — ATORVASTATIN CALCIUM 40 MG/1
40 TABLET, FILM COATED ORAL NIGHTLY
Qty: 30 TABLET | Refills: 3 | Status: ON HOLD
Start: 2025-07-09

## 2025-07-09 RX ORDER — GABAPENTIN 100 MG/1
100 CAPSULE ORAL 3 TIMES DAILY
Qty: 90 CAPSULE | Refills: 0 | Status: ON HOLD
Start: 2025-07-09 | End: 2025-08-08

## 2025-07-09 RX ORDER — ONDANSETRON 4 MG/1
4 TABLET, ORALLY DISINTEGRATING ORAL EVERY 8 HOURS PRN
Status: CANCELLED | OUTPATIENT
Start: 2025-07-09

## 2025-07-09 RX ORDER — METOPROLOL SUCCINATE 50 MG/1
50 TABLET, EXTENDED RELEASE ORAL DAILY
Status: DISCONTINUED | OUTPATIENT
Start: 2025-07-10 | End: 2025-07-10

## 2025-07-09 RX ORDER — METHYLPHENIDATE HYDROCHLORIDE 10 MG/1
5 TABLET ORAL 2 TIMES DAILY
Refills: 0 | Status: DISCONTINUED | OUTPATIENT
Start: 2025-07-10 | End: 2025-07-24 | Stop reason: HOSPADM

## 2025-07-09 RX ORDER — HEPARIN SODIUM 5000 [USP'U]/ML
5000 INJECTION, SOLUTION INTRAVENOUS; SUBCUTANEOUS EVERY 8 HOURS SCHEDULED
Status: CANCELLED | OUTPATIENT
Start: 2025-07-09

## 2025-07-09 RX ORDER — ACETAMINOPHEN 325 MG/1
650 TABLET ORAL EVERY 6 HOURS PRN
Status: DISCONTINUED | OUTPATIENT
Start: 2025-07-09 | End: 2025-07-24 | Stop reason: HOSPADM

## 2025-07-09 RX ORDER — ATORVASTATIN CALCIUM 40 MG/1
40 TABLET, FILM COATED ORAL NIGHTLY
Status: DISCONTINUED | OUTPATIENT
Start: 2025-07-09 | End: 2025-07-24 | Stop reason: HOSPADM

## 2025-07-09 RX ORDER — GLUCAGON 1 MG/ML
1 KIT INJECTION PRN
Status: CANCELLED | OUTPATIENT
Start: 2025-07-09

## 2025-07-09 RX ORDER — BISACODYL 10 MG
10 SUPPOSITORY, RECTAL RECTAL DAILY PRN
Status: CANCELLED | OUTPATIENT
Start: 2025-07-09

## 2025-07-09 RX ORDER — MECOBALAMIN 5000 MCG
10 TABLET,DISINTEGRATING ORAL NIGHTLY
Qty: 60 TABLET | Refills: 0 | Status: ON HOLD
Start: 2025-07-09 | End: 2025-08-08

## 2025-07-09 RX ORDER — OXYCODONE HYDROCHLORIDE 5 MG/1
5 TABLET ORAL EVERY 4 HOURS PRN
Refills: 0 | Status: DISCONTINUED | OUTPATIENT
Start: 2025-07-09 | End: 2025-07-24 | Stop reason: HOSPADM

## 2025-07-09 RX ORDER — MECOBALAMIN 5000 MCG
10 TABLET,DISINTEGRATING ORAL NIGHTLY
Status: CANCELLED | OUTPATIENT
Start: 2025-07-09

## 2025-07-09 RX ORDER — CALCIUM CARBONATE 500 MG/1
500 TABLET, CHEWABLE ORAL DAILY
Status: CANCELLED | OUTPATIENT
Start: 2025-07-10

## 2025-07-09 RX ORDER — CALCIUM CARBONATE 500 MG/1
500 TABLET, CHEWABLE ORAL DAILY
Status: DISCONTINUED | OUTPATIENT
Start: 2025-07-10 | End: 2025-07-24 | Stop reason: HOSPADM

## 2025-07-09 RX ORDER — OXYCODONE HYDROCHLORIDE 5 MG/1
5-10 TABLET ORAL EVERY 6 HOURS PRN
Qty: 28 TABLET | Refills: 0 | Status: ON HOLD
Start: 2025-07-09 | End: 2025-07-16

## 2025-07-09 RX ADMIN — METHYLPHENIDATE HYDROCHLORIDE 5 MG: 5 TABLET ORAL at 12:53

## 2025-07-09 RX ADMIN — OXYCODONE 10 MG: 5 TABLET ORAL at 12:53

## 2025-07-09 RX ADMIN — GABAPENTIN 100 MG: 100 CAPSULE ORAL at 12:52

## 2025-07-09 RX ADMIN — GABAPENTIN 100 MG: 100 CAPSULE ORAL at 09:15

## 2025-07-09 RX ADMIN — HEPARIN SODIUM 5000 UNITS: 5000 INJECTION INTRAVENOUS; SUBCUTANEOUS at 06:42

## 2025-07-09 RX ADMIN — PANTOPRAZOLE SODIUM 20 MG: 20 TABLET, DELAYED RELEASE ORAL at 21:29

## 2025-07-09 RX ADMIN — HEPARIN SODIUM 5000 UNITS: 5000 INJECTION INTRAVENOUS; SUBCUTANEOUS at 12:52

## 2025-07-09 RX ADMIN — OXYCODONE HYDROCHLORIDE 10 MG: 10 TABLET ORAL at 21:28

## 2025-07-09 RX ADMIN — ATORVASTATIN CALCIUM 40 MG: 40 TABLET, FILM COATED ORAL at 21:29

## 2025-07-09 RX ADMIN — ACETAMINOPHEN 650 MG: 325 TABLET ORAL at 06:40

## 2025-07-09 RX ADMIN — OXYCODONE 10 MG: 5 TABLET ORAL at 06:40

## 2025-07-09 RX ADMIN — GABAPENTIN 100 MG: 100 CAPSULE ORAL at 21:29

## 2025-07-09 RX ADMIN — POLYETHYLENE GLYCOL 3350 17 G: 17 POWDER, FOR SOLUTION ORAL at 09:09

## 2025-07-09 RX ADMIN — METHYLPHENIDATE HYDROCHLORIDE 5 MG: 5 TABLET ORAL at 09:10

## 2025-07-09 RX ADMIN — INSULIN LISPRO 1 UNITS: 100 INJECTION, SOLUTION INTRAVENOUS; SUBCUTANEOUS at 12:56

## 2025-07-09 RX ADMIN — ACETAMINOPHEN 650 MG: 325 TABLET ORAL at 12:52

## 2025-07-09 RX ADMIN — FLUOXETINE HYDROCHLORIDE 20 MG: 20 CAPSULE ORAL at 09:10

## 2025-07-09 RX ADMIN — INSULIN GLARGINE 20 UNITS: 100 INJECTION, SOLUTION SUBCUTANEOUS at 21:30

## 2025-07-09 RX ADMIN — HEPARIN SODIUM 5000 UNITS: 5000 INJECTION INTRAVENOUS; SUBCUTANEOUS at 21:28

## 2025-07-09 RX ADMIN — PANTOPRAZOLE SODIUM 20 MG: 20 TABLET, DELAYED RELEASE ORAL at 09:10

## 2025-07-09 RX ADMIN — SENNOSIDES, DOCUSATE SODIUM 2 TABLET: 50; 8.6 TABLET, FILM COATED ORAL at 09:09

## 2025-07-09 RX ADMIN — METOPROLOL SUCCINATE 50 MG: 50 TABLET, EXTENDED RELEASE ORAL at 09:09

## 2025-07-09 RX ADMIN — Medication 9 MG: at 21:29

## 2025-07-09 RX ADMIN — SODIUM CHLORIDE, PRESERVATIVE FREE 10 ML: 5 INJECTION INTRAVENOUS at 09:10

## 2025-07-09 RX ADMIN — ANTACID TABLETS 500 MG: 500 TABLET, CHEWABLE ORAL at 09:09

## 2025-07-09 RX ADMIN — PRAZOSIN HYDROCHLORIDE 1 MG: 1 CAPSULE ORAL at 22:03

## 2025-07-09 ASSESSMENT — PAIN SCALES - GENERAL
PAINLEVEL_OUTOF10: 4
PAINLEVEL_OUTOF10: 3
PAINLEVEL_OUTOF10: 7
PAINLEVEL_OUTOF10: 8
PAINLEVEL_OUTOF10: 5
PAINLEVEL_OUTOF10: 8
PAINLEVEL_OUTOF10: 8

## 2025-07-09 ASSESSMENT — PAIN - FUNCTIONAL ASSESSMENT
PAIN_FUNCTIONAL_ASSESSMENT: PREVENTS OR INTERFERES SOME ACTIVE ACTIVITIES AND ADLS
PAIN_FUNCTIONAL_ASSESSMENT: ACTIVITIES ARE NOT PREVENTED
PAIN_FUNCTIONAL_ASSESSMENT: ACTIVITIES ARE NOT PREVENTED

## 2025-07-09 ASSESSMENT — PAIN DESCRIPTION - FREQUENCY: FREQUENCY: INTERMITTENT

## 2025-07-09 ASSESSMENT — PAIN DESCRIPTION - ONSET: ONSET: ON-GOING

## 2025-07-09 ASSESSMENT — PAIN DESCRIPTION - DESCRIPTORS
DESCRIPTORS: SHARP
DESCRIPTORS: ACHING
DESCRIPTORS: SHARP
DESCRIPTORS: ACHING

## 2025-07-09 ASSESSMENT — PAIN DESCRIPTION - LOCATION
LOCATION: HEAD;INCISION
LOCATION: HEAD

## 2025-07-09 ASSESSMENT — PAIN DESCRIPTION - ORIENTATION
ORIENTATION: RIGHT

## 2025-07-09 ASSESSMENT — PAIN DESCRIPTION - PAIN TYPE: TYPE: SURGICAL PAIN

## 2025-07-09 NOTE — PROGRESS NOTES
Patient admitted to rehab with normal pressure hydrocephalus.  A/Ox4. Patient resting in bed, patient oriented to the unit, VS stables. Transfers with walker. Mobility restrictions: WBAT. On regular 4cc diet, tolerating well. Medications taken whole with thins. On heaprin for DVT prophylaxis.  Skin: surgical incision on head. Oxygen: RA. LDA: NONE. Has been continent of bowel and incontinent of bladder. LBM 7/9. Chair/bed alarms in use and call light in reach. Plan of care ongoing.

## 2025-07-09 NOTE — PROGRESS NOTES
NEUROLOGY PROGRESS NOTE       Patient Name: Elio Almodovar YOB: 1955   Sex: Male Age: 70 yrs     Presenting CC: No chief complaint on file.    Reason for Consult: Stroke    Changes over last 24 hours:   CTA head and neck without significant stenosis or occlusion  Complaining of 10/10 headache this AM.    ROS: Headache    ASSESSMENT & RECOMMENDATIONS   Assessment:  69 y/o RH man with a h/o significant for NPH who was admitted for a  shunt placement, done without complications on 7/1/25.  Post-op imaging showed a possible stroke in the R frontal lobe along the approach of the catheter.  Neuro exam w/ some delayed responses, and slight memory trouble (unclear what baseline is, as he has some cognitive issues at baseline).  Possible stroke.  Unclear if this could simply be damage related to the approach of the ventriculostomy catheter, but the area of MRI changes seems too large for that.  If a stroke, it still could have been from local arterial compression, but warrants a workup.    Plan:  - CT Head completed for continued HA, ventricular size stable, does note some new/increased IVH in L temporal horn.  - Repeat CT Head in AM  - TTE ordered  - When ok from NSGY standpoint and if ok based on review of prior medical history, would recommend starting ASA 81mg daily (would specifically need to review GI history as he had an extensive stay at the end of 2024 inpatient for GI related issues)   - Initiate statin  - Q4 hour neuro checks, NIHSS Q shift  - Add Gabapentin for HA, 100 mg TID  - Hgb A1c and lipid panel  - PT/OT/SLP  - Neurology follow up after discharge    Case discussed with Dr. Childers, Dr. Diaz, patient, bedside RN    Honey Green, AMINAH - CNP   Neurology  7/6/2025 4:41 PM  [facility]: [facility] Neurology    HISTORY     Initial HPI: Elio Almodovar is a 70 y.o. y/o RH male with history significant for NPH, who was admitted on 7/1/25 for a  shunt placement.  He underwent uncomplicated 
       NEUROLOGY PROGRESS NOTE       Patient Name: Elio Almodovar YOB: 1955   Sex: Male Age: 70 yrs     Presenting CC: No chief complaint on file.    Reason for Consult: Stroke    Changes over last 24 hours:   CTA head and neck without significant stenosis or occlusion  No complaints this AM    ROS: No complaints    ASSESSMENT & RECOMMENDATIONS   Assessment:  69 y/o RH man with a h/o significant for NPH who was admitted for a  shunt placement, done without complications on 7/1/25.  Post-op imaging showed a possible stroke in the R frontal lobe along the approach of the catheter.  Neuro exam w/ some delayed responses, and slight memory trouble (unclear what baseline is, as he has some cognitive issues at baseline).  Possible stroke.  Unclear if this could simply be damage related to the approach of the ventriculostomy catheter, but the area of MRI changes seems too large for that.  If a stroke, it still could have been from local arterial compression, but warrants a workup.    Plan:  - CTA Head and neck completed  - TTE completed 7/7, EF 60-65% without PFO  - When ok from NSGY standpoint and if ok based on review of prior medical history, would recommend starting ASA 81mg daily (would specifically need to review GI history as he had an extensive stay at the end of 2024 inpatient for GI related issues)   - Atorvastatin 40 mg Nightly started  - Q4 hour neuro checks, NIHSS Q shift  - Hgb A1c and lipid panel  LDL 71  Hgb A1C 7.5%  Glucose control per primary team  - PT/OT/SLP  - Neurology follow up after discharge  - Secondary stroke prevention    BP goal <130/80   LDL goal <70   A1C goal <7.0   Encourage lifestyle modification  -We will sign off. Please call with questions.     Case discussed with Dr. Argueta, patient    Gracy E Reyes, APRN - [facility]    Neurology  7/7/2025 5:12 PM  [facility]: [facility] Neurology    HISTORY     Initial HPI: Elio Almodovar is a 70 y.o. y/o RH male with history significant for 
       NEUROLOGY PROGRESS NOTE       Patient Name: Elio Almodovar YOB: 1955   Sex: Male Age: 70 yrs     Presenting CC: No chief complaint on file.    Reason for Consult: Stroke    Changes over last 24 hours:   CTA head and neck without significant stenosis or occlusion  No complaints this AM    ROS: No complaints    ASSESSMENT & RECOMMENDATIONS   Assessment:  71 y/o RH man with a h/o significant for NPH who was admitted for a  shunt placement, done without complications on 7/1/25.  Post-op imaging showed a possible stroke in the R frontal lobe along the approach of the catheter.  Neuro exam w/ some delayed responses, and slight memory trouble (unclear what baseline is, as he has some cognitive issues at baseline).  Possible stroke.  Unclear if this could simply be damage related to the approach of the ventriculostomy catheter, but the area of MRI changes seems too large for that.  If a stroke, it still could have been from local arterial compression, but warrants a workup.    Plan:  - CTA Head and neck completed  - TTE ordered  - When ok from NSGY standpoint and if ok based on review of prior medical history, would recommend starting ASA 81mg daily (would specifically need to review GI history as he had an extensive stay at the end of 2024 inpatient for GI related issues)   - Initiate statin  - Q4 hour neuro checks, NIHSS Q shift  - Hgb A1c and lipid panel  - PT/OT/SLP  - Neurology follow up after discharge    Case discussed with Dr. Childers, patient    Honey Green, AMINAH - [facility]   Neurology  7/5/2025 11:00 AM  [facility]: [facility] Neurology    HISTORY     Initial HPI: Elio Almodovar is a 70 y.o. y/o RH male with history significant for NPH, who was admitted on 7/1/25 for a  shunt placement.  He underwent uncomplicated placement of the  shunt by neurosurgery Dr. iDaz on 7/1/25, and a routine post-op CTH showed an area of hypodensity around the shunt tract in the R frontal lobe.  MRI brain 
      Hospitalist Progress Note      Name:  Elio Almodovar /Age/Sex: 1955  (70 y.o. male)   MRN & CSN:  [id number] & [id number] Encounter Date/Time: 2025 9:19 AM EDT    Location:  5526/5526-01 PCP: Cecil Smith MD       Hospital Day: 8         Date of Admission: 2025    Chief Complaint: Normal pressure hydrocephalus     Hospital Course: Post-operative Day # 7 s/p right ventriculoperitoneal shunt by Dr. Diaz. Waiting on insurance for precert to go to [facility]  [facility] for depression, insomnia, diabetes II, HTN, HLD, ADHD. Medical team continues to follow for chronic conditions.     Subjective: Sitting in chair today; mild headache     Assessment and Recommendations:    Normal pressure hydrocephalus   Neurosurgery as primary   Neurology following for stroke workup; ASA 81 mg on POD 14   PT/OT as tolerated   ARU pending   Depression w/suicide ideation   Telehealth evaluated; referred to outpt psychiatrist and therapist upon dc   Continue Prazosin 1 MG QHS; Prozac 20 MG QD   Denies suicide ideation  Insomnia   Continue Ambien as ordered; trazodone increased to 150 MG did not help   Encourage night time down time; reduction of interruptions  DMII   A1c 7.5 (25)   Lantus 20 Units QHS; SSI    Blood sugar monitoring; hypoglycemia protocol  HTN and HLD   BP stable, continue home medications   Continue statin; monitor BP  ADHD  Okay to continue PO methylphenidate    Chronic comorbidities continue outpatient regimen unless otherwise contraindicated    Active Hospital Problems    Diagnosis     Depression with suicidal ideation [F32.A, R45.851]     Normal pressure hydrocephalus (HCC) [G91.2]        Diet ADULT DIET; Regular; 4 carb choices (60 gm/meal); Safety Tray; Safety Tray (Disposables)  ADULT ORAL NUTRITION SUPPLEMENT; Dinner; Diabetic Oral Supplement   DVT Prophylaxis [] Lovenox, [x]  Heparin, [] SCDs, [] Ambulation   GI Prophylaxis [x] PPI,  [] H2 Blocker,  [] Carafate,  [x] Diet/Tube Feeds 
    NEUROSURGERY POST-OP PROGRESS NOTE    Patient Name: Elio Almodovar YOB: 1955   Sex: Male Age: 70 yrs     Medical Record Number: [id number] Acct Number: [id number]   Room Number: 5526/5526-01 Hospital Day: Hospital Day: 8     Interval History:    Post-operative Day#7 s/p right ventriculoperitoneal shunt by Dr. Diaz     Subjective:  resting in chair, has no complaints. Discussed we are waiting on insurance for precert to go to [facility].     Objective:    VITAL SIGNS   /68   Pulse 50   Temp 97.5 °F (36.4 °C) (Oral)   Resp 18   Ht 1.727 m (5' 7.99\")   Wt 91.2 kg (201 lb 1.9 oz)   SpO2 94%   BMI 30.59 kg/m²    Height Height: 172.7 cm (5' 7.99\")   Weight Weight - Scale: 91.2 kg (201 lb 1.9 oz)        Allergies No Known Allergies   NPO Status ADULT DIET; Regular; 4 carb choices (60 gm/meal); Safety Tray; Safety Tray (Disposables)  ADULT ORAL NUTRITION SUPPLEMENT; Dinner; Diabetic Oral Supplement   Isolation No active isolations     LABS   Basic Metabolic Profile Recent Labs     07/05/25  0938 07/06/25  0937 07/07/25  0930    137 136    100 101   CO2 27 23 25   BUN 22* 21* 17   CREATININE 1.1 1.2 1.2   GLUCOSE 108* 95 166*      Complete Blood Count No results for input(s): \"WBC\", \"RBC\" in the last 72 hours.    Invalid input(s): \"HEMOGLOBIN\", \"HEMATOCRIT\"     Coagulation Studies No results for input(s): \"INR\" in the last 72 hours.    Invalid input(s): \"PLATELETS\", \"PROA\", \"PT\", \"PTTA\", \"PTT\"       MEDICATIONS   Inpatient Medications     gabapentin, 100 mg, Oral, TID    atorvastatin, 40 mg, Oral, Nightly    melatonin, 10 mg, Oral, Nightly    insulin glargine, 20 Units, SubCUTAneous, Nightly    sennosides-docusate sodium, 2 tablet, Oral, BID    heparin (porcine), 5,000 Units, SubCUTAneous, 3 times per day    FLUoxetine, 20 mg, Oral, Daily    prazosin, 1 mg, Oral, Nightly    calcium carbonate, 500 mg, Oral, Daily    [Held by provider] lisinopril, 20 mg, Oral, Daily    [Held 
    NEUROSURGERY POST-OP PROGRESS NOTE    Patient Name: Elio Almodovar YOB: 1955   Sex: Male Age: 70 yrs     Medical Record Number: [id number] Acct Number: [id number]   Room Number: 5526/5526-01 Hospital Day: Hospital Day: 7     Interval History:    Post-operative Day#6 s/p right ventriculoperitoneal shunt by Dr. Diaz     Subjective:  resting in bed, has no complaints. Discussed therapy's recommendations of ARU, he is hesitant but agreeable and wants social work to talk to his wife     Objective:    VITAL SIGNS   /64   Pulse (!) 47   Temp 97.5 °F (36.4 °C) (Oral)   Resp 18   Ht 1.727 m (5' 7.99\")   Wt 91.2 kg (201 lb 1.9 oz)   SpO2 97%   BMI 30.59 kg/m²    Height Height: 172.7 cm (5' 7.99\")   Weight Weight - Scale: 91.2 kg (201 lb 1.9 oz)        Allergies No Known Allergies   NPO Status ADULT DIET; Regular; 4 carb choices (60 gm/meal); Safety Tray; Safety Tray (Disposables)  ADULT ORAL NUTRITION SUPPLEMENT; Dinner; Diabetic Oral Supplement   Isolation No active isolations     LABS   Basic Metabolic Profile Recent Labs     07/05/25  0938 07/06/25  0937    137    100   CO2 27 23   BUN 22* 21*   CREATININE 1.1 1.2   GLUCOSE 108* 95      Complete Blood Count No results for input(s): \"WBC\", \"RBC\" in the last 72 hours.    Invalid input(s): \"HEMOGLOBIN\", \"HEMATOCRIT\"     Coagulation Studies No results for input(s): \"INR\" in the last 72 hours.    Invalid input(s): \"PLATELETS\", \"PROA\", \"PT\", \"PTTA\", \"PTT\"       MEDICATIONS   Inpatient Medications     gabapentin, 100 mg, Oral, TID    atorvastatin, 40 mg, Oral, Nightly    melatonin, 10 mg, Oral, Nightly    insulin glargine, 20 Units, SubCUTAneous, Nightly    sennosides-docusate sodium, 2 tablet, Oral, BID    heparin (porcine), 5,000 Units, SubCUTAneous, 3 times per day    FLUoxetine, 20 mg, Oral, Daily    prazosin, 1 mg, Oral, Nightly    calcium carbonate, 500 mg, Oral, Daily    [Held by provider] lisinopril, 20 mg, Oral, Daily    
    NEUROSURGERY POST-OP PROGRESS NOTE    Patient Name: Elio Almodovar YOB: 1955   Sex: Male Age: 70 yrs     Medical Record Number: [id number] Acct Number: [id number]   Room Number: 5527/5527-01 Hospital Day: Hospital Day: 2     Interval History:  Post-operative Day# 1 s/p right ventriculoperitoneal shunt by Dr. Diaz     Subjective: patient resting in bed, reports incisional pain but overall feels well. Denies nausea. Has not been up with therapy yet     Objective:    VITAL SIGNS   /66   Pulse 66   Temp 98.4 °F (36.9 °C) (Oral)   Resp 16   Ht 1.727 m (5' 7.99\")   Wt 91.2 kg (201 lb 1.9 oz)   SpO2 95%   BMI 30.59 kg/m²    Height Height: 172.7 cm (5' 7.99\")   Weight Weight - Scale: 91.2 kg (201 lb 1.9 oz)        Allergies No Known Allergies   NPO Status ADULT ORAL NUTRITION SUPPLEMENT; Breakfast, Lunch, Dinner; Standard High Calorie/High Protein Oral Supplement  ADULT DIET; Regular; 3 carb choices (45 gm/meal); Safety Tray; Safety Tray (Disposables)   Isolation No active isolations     LABS   Basic Metabolic Profile Recent Labs     07/01/25  1130         CO2 29   BUN 18   CREATININE 1.1   GLUCOSE 151*      Complete Blood Count Recent Labs     07/01/25  1130   WBC 10.2   RBC 4.89      Coagulation Studies Recent Labs     07/01/25  1130   INR 0.94        MEDICATIONS   Inpatient Medications     calcium carbonate, 500 mg, Oral, Daily    lisinopril, 20 mg, Oral, Daily    [Held by provider] metFORMIN, 500 mg, Oral, BID WC    metoprolol succinate, 50 mg, Oral, Daily    methylphenidate, 5 mg, Oral, BID    pantoprazole, 20 mg, Oral, BID    traZODone, 100 mg, Oral, Nightly    sodium chloride flush, 5-40 mL, IntraVENous, 2 times per day    acetaminophen, 650 mg, Oral, Q6H    polyethylene glycol, 17 g, Oral, Daily    insulin lispro, 0-4 Units, SubCUTAneous, 4x Daily AC & HS   Infusions    sodium chloride      sodium chloride 900 mL (07/01/25 1505)    dextrose        Antibiotics   Recent 
    NEUROSURGERY POST-OP PROGRESS NOTE    Patient Name: Elio Almodovar YOB: 1955   Sex: Male Age: 70 yrs     Medical Record Number: [id number] Acct Number: [id number]   Room Number: 5526/5526-01 Hospital Day: Hospital Day: 9     Interval History:    Post-operative Day#8 s/p right ventriculoperitoneal shunt by Dr. Diaz     Subjective:  resting in chair, has no complaints. Pt has precert and will be leaving for ARU today.     Objective:    VITAL SIGNS   BP (!) 115/55   Pulse 56   Temp 97.4 °F (36.3 °C) (Oral)   Resp 16   Ht 1.727 m (5' 7.99\")   Wt 91.2 kg (201 lb 1.9 oz)   SpO2 97%   BMI 30.59 kg/m²    Height Height: 172.7 cm (5' 7.99\")   Weight Weight - Scale: 91.2 kg (201 lb 1.9 oz)        Allergies No Known Allergies   NPO Status ADULT DIET; Regular; 4 carb choices (60 gm/meal); Safety Tray; Safety Tray (Disposables)  ADULT ORAL NUTRITION SUPPLEMENT; Dinner; Diabetic Oral Supplement   Isolation No active isolations     LABS   Basic Metabolic Profile Recent Labs     07/07/25  0930 07/09/25  0949    135*    100   CO2 25 26   BUN 17 22*   CREATININE 1.2 1.2   GLUCOSE 166* 236*      Complete Blood Count Recent Labs     07/09/25  0949   WBC 7.4   RBC 3.95*        Coagulation Studies No results for input(s): \"INR\" in the last 72 hours.    Invalid input(s): \"PLATELETS\", \"PROA\", \"PT\", \"PTTA\", \"PTT\"       MEDICATIONS   Inpatient Medications     bisacodyl, 10 mg, Oral, Once    gabapentin, 100 mg, Oral, TID    atorvastatin, 40 mg, Oral, Nightly    melatonin, 10 mg, Oral, Nightly    insulin glargine, 20 Units, SubCUTAneous, Nightly    sennosides-docusate sodium, 2 tablet, Oral, BID    heparin (porcine), 5,000 Units, SubCUTAneous, 3 times per day    FLUoxetine, 20 mg, Oral, Daily    prazosin, 1 mg, Oral, Nightly    calcium carbonate, 500 mg, Oral, Daily    [Held by provider] lisinopril, 20 mg, Oral, Daily    [Held by provider] metFORMIN, 500 mg, Oral, BID WC    metoprolol succinate, 50 mg, 
    V2.0  [facility] [place]      Name:  Elio Almodovar /Age/Sex: 1955  (70 y.o. male)   MRN & CSN:  [id number] & [id number] Encounter Date/Time: 2025 8:04 PM EDT   Location:  55/5527-01 PCP: Cecil Smith MD     Attending:Julianna Diaz MD  Consulting Provider: Jada Gama MD      Hospital Day: 2    Assessment and Recommendations       Hospital Problems           Last Modified POA    * (Principal) Normal pressure hydrocephalus (HCC) 2025 Yes    Depression with suicidal ideation 2025 Yes     Normal pressure hydrocephalus  Reports his balance has been a problem and that's the primary reason he got the surgery  S/p placement of right  shunt, Codman hakim shunt valve set to 160  Perioperative antibiotics cefazolin in place  DVT prophylaxis with SCDs  Oxycodone in place for pain control, morphine for breakthrough pain  No bowel regimen while on opiate pain medications    Depression with suicidal ideation-seen by telepsych; referred to outpatient psychiatrist and therapist upon discharge  Recommended to start trazodone 100 mg nightly  Prazosin 1 mg nightly  And Prozac 20 mg daily      Type 2 diabetes, insulin-dependent  Takes 32 units of glargine at nursing home  Blood glucose mostly in range  For now I will start patient on low-dose sliding scale insulin  Insulin is high risk medication with narrow therapeutic index, reviewed BG trend and adjusted insulin doses  Hypoglycemia protocol in place  Carb-controlled diet    Hypertension okay to continue lisinopril and Toprol-XL  Hypotensive after therapy today ordered fluid bolus; held lisinopril    ADHD okay to continue methylphenidate    Chronic insomnia continue trazodone 100 mg nightly      Diet ADULT ORAL NUTRITION SUPPLEMENT; Breakfast, Lunch, Dinner; Standard High Calorie/High Protein Oral Supplement  ADULT DIET; Regular; 3 carb choices (45 gm/meal); Safety Tray; Safety Tray (Disposables)   DVT Prophylaxis [] Lovenox, []  Heparin, 
    V2.0  [facility] Progress Note      Name:  Elio Almodovar /Age/Sex: 1955  (70 y.o. 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; neurology consult      Normal pressure hydrocephalus  Reports his balance has been a problem and that's the primary reason he got the surgery  S/p placement of right  shunt, Codman hakim shunt valve set to 160  Perioperative antibiotics cefazolin in place  DVT prophylaxis with SCDs  Oxycodone in place for pain control, morphine for breakthrough pain  No bowel regimen while on opiate pain medications    Depression with suicidal ideation-seen by telepsych; referred to outpatient psychiatrist and therapist upon discharge  Recommended to increase trazodone to 150 mg nightly  Prazosin 1 mg nightly  And Prozac 20 mg daily    Insomnia   tried Klonopin 0.5 nightly, trazodone ineffective  Tried Ambien; was effective      Type 2 diabetes, insulin-dependent  Takes 32 units of glargine at nursing home  Blood glucose mostly in range  For now I will start patient on low-dose sliding scale insulin  Insulin is high risk medication with narrow therapeutic index, reviewed BG trend and adjusted insulin doses  Hypoglycemia protocol in place  Carb-controlled diet    Hypertension okay to continue lisinopril and Toprol-XL  Hypotensive after therapy today ordered fluid bolus; held 
    V2.0  [facility] [place]      Name:  Elio Almodovar /Age/Sex: 1955  (70 y.o. male)   MRN & CSN:  [id number] & [id number] Encounter Date/Time: 2025 8:04 PM EDT   Location:  [facility]/5526-01 PCP: Cecil Smith MD     Attending:Julianna Diaz MD  Consulting Provider: Jada Gama MD      Hospital Day: 7    Assessment and Recommendations       Hospital Problems           Last Modified POA    * (Principal) Normal pressure hydrocephalus (HCC) 2025 Yes    Depression with suicidal ideation 2025 Yes       Interval history -patient appears to be more relaxed today he was able to sleep well for the last 2 nights; reports that his headache has resolved; appetite good    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  Echocardiogram done today with no findings of intra atrial shunt with bubble      Normal pressure hydrocephalus  Reports his balance has been a problem and that's the primary reason he got the surgery  S/p placement of right  shunt, Codman hakim shunt valve set to 160  Perioperative antibiotics cefazolin in place  DVT prophylaxis with SCDs  Oxycodone in place for pain control, morphine for breakthrough pain  No bowel regimen while on opiate pain medications    Depression with suicidal ideation-seen by telepsych; referred to outpatient psychiatrist and therapist upon discharge  Recommended to increase trazodone to 150 mg nightly-not affect  Prazosin 1 mg nightly  And Prozac 20 mg daily    Insomnia   tried Klonopin 0.5 nightly, trazodone ineffective  Tried Ambien; was effective; patient slept well for last 2 nights      Type 2 diabetes, insulin-dependent  Takes 32 units of glargine at nursing home  Blood glucose mostly in range  For now I will start patient on low-dose sliding scale insulin  Insulin is high risk medication with narrow therapeutic index, reviewed BG trend and 
    V2.0  [facility] [place]      Name:  Elio Almodovar /Age/Sex: 1955  (70 y.o. male)   MRN & CSN:  [id number] & [id number] Encounter Date/Time: 2025 8:04 PM EDT   Location:  5527/5527-01 PCP: Cecil Smith MD     Attending:Julianna Diaz MD  Consulting Provider: Jada Gama MD      Hospital Day: 5    Assessment and Recommendations       Hospital Problems           Last Modified POA    * (Principal) Normal pressure hydrocephalus (HCC) 2025 Yes    Depression with suicidal ideation 2025 Yes       Interval history -continues to have headache; neurology saw the patient would like to start aspirin if cleared by neurosurgery; No bowel movement for several days aggressive bowel regime    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; neurology consult      Normal pressure hydrocephalus  Reports his balance has been a problem and that's the primary reason he got the surgery  S/p placement of right  shunt, Codman hakim shunt valve set to 160  Perioperative antibiotics cefazolin in place  DVT prophylaxis with SCDs  Oxycodone in place for pain control, morphine for breakthrough pain  No bowel regimen while on opiate pain medications    Depression with suicidal ideation-seen by telepsych; referred to outpatient psychiatrist and therapist upon discharge  Recommended to increase trazodone to 150 mg nightly  Prazosin 1 mg nightly  And Prozac 20 mg daily  Will add Klonopin 0.5 nightly      Type 2 diabetes, insulin-dependent  Takes 32 units of glargine at nursing home  Blood glucose mostly in range  For now I will start patient on low-dose sliding scale insulin  Insulin is high risk medication with narrow therapeutic index, reviewed BG trend and adjusted insulin doses  Hypoglycemia protocol in place  Carb-controlled diet    Hypertension okay to continue lisinopril and Toprol-XL  Hypotensive after therapy today ordered fluid bolus; held lisinopril    ADHD okay to continue 
  Current NIHSS 1      Nursing Core Measures for Stroke:   [x]   Education template documentation (STROKE/TIA). Select only risk factors that are applicable to patient when selecting risk factors.  [x]   Care Plan template documentation (Physiologic Instability - Neurosensory). Selecting this will add care plan rows to the flowsheet under the Neuro section of Head to Toe.  [x]   Verified Swallow Screen completed prior to PO intake of food, drink, medications.          Please verify correct medication route prior to administration for intubated patients, patients who can not swallow or have alternative routes of intake (NG, OG, [place]), etc  [x]   VTE Prophylaxis: SCDs ordered/addressed; SCDs: N/A Heparin           (As a reminder, ASA, Plavix, and TPA/TNK are not VTE prophylaxis.)    Reviewed the Following Education with Patient and/or Family:   - Personalized risk factors for patient, along with changes, modifications that will help prevent stroke.  - Signs and Symptoms of Stroke: (Facial droop, weakness/numbness especially on one side, speech difficulty, sudden confusion, sudden loss of vision, sudden severe headache, sudden loss of balance or having difficulty walking, syncope, or seizure)  - How to activate EMS (911)   - Importance of Follow Up Appointments at Discharge   - Importance of Compliance with Medications Prescribed at Discharge  - Available community resources and stroke advocacy groups if needed    Patient and/or family member: verbalized understanding.     Stroke Education booklet given to patient/family (or verified, if given already), which reviews above information. yes         Electronically signed by Prachi Peters RN on 7/8/2025 at 5:32 AM    
  Current NIHSS 1     Nursing Core Measures for Stroke:   [x]   Education template documentation (STROKE/TIA). Select only risk factors that are applicable to patient when selecting risk factors.  [x]   Care Plan template documentation (Physiologic Instability - Neurosensory). Selecting this will add care plan rows to the flowsheet under the Neuro section of Head to Toe.  [x]   Verified Swallow Screen completed prior to PO intake of food, drink, medications.          Please verify correct medication route prior to administration for intubated patients, patients who can not swallow or have alternative routes of intake (NG, OG, [place]), etc  [x]   VTE Prophylaxis: SCDs ordered/addressed; SCDs: N/A Heparin           (As a reminder, ASA, Plavix, and TPA/TNK are not VTE prophylaxis.)    Reviewed the Following Education with Patient and/or Family:   - Personalized risk factors for patient, along with changes, modifications that will help prevent stroke.  - Signs and Symptoms of Stroke: (Facial droop, weakness/numbness especially on one side, speech difficulty, sudden confusion, sudden loss of vision, sudden severe headache, sudden loss of balance or having difficulty walking, syncope, or seizure)  - How to activate EMS (911)   - Importance of Follow Up Appointments at Discharge   - Importance of Compliance with Medications Prescribed at Discharge  - Available community resources and stroke advocacy groups if needed    Patient and/or family member: verbalized understanding.     Stroke Education booklet given to patient/family (or verified, if given already), which reviews above information. yes         Electronically signed by Prachi Peters RN on 7/7/2025 at 5:51 AM    
  Current NIHSS 1    Nursing Core Measures for Stroke:   [x]   Education template documentation (STROKE/TIA). Select only risk factors that are applicable to patient when selecting risk factors.  [x]   Care Plan template documentation (Physiologic Instability - Neurosensory). Selecting this will add care plan rows to the flowsheet under the Neuro section of Head to Toe.  [x]   Verified Swallow Screen completed prior to PO intake of food, drink, medications.          Please verify correct medication route prior to administration for intubated patients, patients who can not swallow or have alternative routes of intake (NG, OG, [place]), etc  [x]   VTE Prophylaxis: SCDs ordered/addressed; SCDs: N/A Heparin           (As a reminder, ASA, Plavix, and TPA/TNK are not VTE prophylaxis.)    Reviewed the Following Education with Patient and/or Family:   - Personalized risk factors for patient, along with changes, modifications that will help prevent stroke.  - Signs and Symptoms of Stroke: (Facial droop, weakness/numbness especially on one side, speech difficulty, sudden confusion, sudden loss of vision, sudden severe headache, sudden loss of balance or having difficulty walking, syncope, or seizure)  - How to activate EMS (911)   - Importance of Follow Up Appointments at Discharge   - Importance of Compliance with Medications Prescribed at Discharge  - Available community resources and stroke advocacy groups if needed    Patient and/or family member: verbalized understanding.     Stroke Education booklet given to patient/family (or verified, if given already), which reviews above information. N/A         Electronically signed by Rita Benitez RN on 7/5/2025 at 5:17 PM    
  Current NIHSS 1    Nursing Core Measures for Stroke:   [x]   Education template documentation (STROKE/TIA). Select only risk factors that are applicable to patient when selecting risk factors.  [x]   Care Plan template documentation (Physiologic Instability - Neurosensory). Selecting this will add care plan rows to the flowsheet under the Neuro section of Head to Toe.  [x]   Verified Swallow Screen completed prior to PO intake of food, drink, medications.          Please verify correct medication route prior to administration for intubated patients, patients who can not swallow or have alternative routes of intake (NG, OG, OR), etc  [x]   VTE Prophylaxis: SCDs ordered/addressed; SCDs: N/A Heparin           (As a reminder, ASA, Plavix, and TPA/TNK are not VTE prophylaxis.)    Reviewed the Following Education with Patient and/or Family:   - Personalized risk factors for patient, along with changes, modifications that will help prevent stroke.  - Signs and Symptoms of Stroke: (Facial droop, weakness/numbness especially on one side, speech difficulty, sudden confusion, sudden loss of vision, sudden severe headache, sudden loss of balance or having difficulty walking, syncope, or seizure)  - How to activate EMS (911)   - Importance of Follow Up Appointments at Discharge   - Importance of Compliance with Medications Prescribed at Discharge  - Available community resources and stroke advocacy groups if needed    Patient and/or family member: verbalized understanding.     Stroke Education booklet given to patient/family (or verified, if given already), which reviews above information. yes         Electronically signed by Prachi Peters RN on 7/8/2025 at 10:40 PM    
  Current NIHSS 1    Nursing Core Measures for Stroke:   [x]   Education template documentation (STROKE/TIA). Select only risk factors that are applicable to patient when selecting risk factors.  [x]   Care Plan template documentation (Physiologic Instability - Neurosensory). Selecting this will add care plan rows to the flowsheet under the Neuro section of Head to Toe.  [x]   Verified Swallow Screen completed prior to PO intake of food, drink, medications.          Please verify correct medication route prior to administration for intubated patients, patients who can not swallow or have alternative routes of intake (NG, OG, [place]), etc  [x]   VTE Prophylaxis: SCDs ordered/addressed; SCDs: N/A Heparin           (As a reminder, ASA, Plavix, and TPA/TNK are not VTE prophylaxis.)    Reviewed the Following Education with Patient and/or Family:   - Personalized risk factors for patient, along with changes, modifications that will help prevent stroke.  - Signs and Symptoms of Stroke: (Facial droop, weakness/numbness especially on one side, speech difficulty, sudden confusion, sudden loss of vision, sudden severe headache, sudden loss of balance or having difficulty walking, syncope, or seizure)  - How to activate EMS (911)   - Importance of Follow Up Appointments at Discharge   - Importance of Compliance with Medications Prescribed at Discharge  - Available community resources and stroke advocacy groups if needed    Patient and/or family member: verbalized understanding.     Stroke Education booklet given to patient/family (or verified, if given already), which reviews above information. N/A         Electronically signed by Rita Benitez RN on 7/6/2025 at 6:20 PM    
  NURSING HOME / GROUP HOME SURGICAL/ PROCEDURE PATIENTS:  Facility Name:  [facility]  Phone #: [phone]  Fax #: [phone]  Nurse spoke with: LOVELY  Can patient sign for themselves?   Yes   If no, POA name:    phone #:    Instructed to fax Advance directive/ POA forms / Living will paperwork: Yes  Is patient able to stand on own: SOMEWHAT W ASSIST   Assistive devices needed: WHEELCHAIR, WALKER  Incontinent: Yes If yes, type:  urine   Skin issues? (i.e. bed sores, scabs) No if yes, describe location, type   Transportation: PER FACILITY, INPT AFTER SURGERY. WIFE COMING phone #:   Recent infection: No  if yes, describe   Being treated:   Blood product refusal: No  Able to read / write: Yes  Behavior issues: No If yes, describe   AICD & / or Pacemaker: No If yes, brand NO  Blood thinners- Last day patient to take per surgeon orders:     Instructions:  Please fax patient's Med list, Dx list, and POA paper work ASAP  Please call for orders from surgeon or PCP re: diabetic medications / blood thinners if not already received  Meds to take day of surgery: dilaudid, metoprolol, protonix  Please fax H&P, labs, & EKG as soon as completed  Instruct no food / drink FROM MIDNIGHT ON prior to arrival (except sip water with meds only)  Dress loose comfortable clothing.  No lotions, powders, nail polish, hairclips or jewelry  Send CPAP if uses  Shower am of surgery with antibacterial soap (or hibiclens, if ordered)  
  Pt A&Ox4 oriented/disoriented at times, VSS on RA. Pt ambulates x1 walker/GB. Pt voiding and tolerating PO fluids. Managing pain with PRN pain meds per MAR. Fall precautions in place. Bed alarm on and in lowest position. Plan of care continues. Incision clean, dry, intact. No acute neuro changes noted.         
 SHUNT INSERTION,CODMAN HAKIM VALVE SET  MM H20 PRESSURE PER DR GRAJEDA.  
4 Eyes Skin Assessment     NAME:  Elio Almodovar  YOB: 1955  MEDICAL RECORD NUMBER:  [id number]    The patient is being assessed for  Admission    I agree that at least one RN has performed a thorough Head to Toe Skin Assessment on the patient. ALL assessment sites listed below have been assessed.      Areas assessed by both nurses:    Head, Face, Ears, Shoulders, Back, Chest, Arms, Elbows, Hands, Sacrum. Buttock, Coccyx, Ischium, Legs. Feet and Heels, and Under Medical Devices         Does the Patient have a Wound? Yes wound(s) were present on assessment. LDA wound assessment was Initiated and completed by RN     -healing ulcer to coccyx, 2mm roughly in diameter, blanchable redness to bilat heels and elbows, incision to head s/p  shunt, x3 lap sites to abd open to air with skin glue     Cesar Prevention initiated by RN: Yes  Wound Care Orders initiated by RN: Yes    Pressure Injury (Stage 3,4, Unstageable, DTI, NWPT, and Complex wounds) if present, place Wound referral order by RN under : Yes    New Ostomies, if present place, Ostomy referral order under : No     Nurse 1 eSignature: Electronically signed by Hilary Bradley RN on 7/1/25 at 6:09 PM EDT    **SHARE this note so that the co-signing nurse can place an eSignature**    Nurse 2 eSignature: Candi Smart RN   
6/30 @ 1225 [phone] & confirms pt is getting PreOp H&P today 6/30 with [phone] @ 4037 & will fax completed & signed H&P to PAT# given with read back. MD    6/30 @ 8073 CE note from today 6/30 incomplete with no physical noted. Spoke with Barb @ PCP [facility], [phone] requesting complete & signed H&P & any lab results to be faxed to PAT# given with read back. MD    6/30 @ 1557 Left VM with Aida Diaz office to notify H&P not visible in EPIC/CE & requested today's 6/30 complete & signed H&P to be faxed from PCP Danitza Ridley, DELANO [phone]. MD    6/30 @ 0481 H&P & PreOp labs visible in CE. Left VM with Aida Diaz with update. MD  
Aida at Dr Diaz states can do labs dos  
General Surgery  Post-operative Note    POST-OP DIAGNOSIS: hydrocephalus    PROCEDURE(S): laparoscopic assistance with  shunt     SUBJECTIVE:   Pain is controlled. No acute complaints     OBJECTIVE:    Physical Exam:  Vitals:   Vitals:    07/01/25 1450 07/01/25 1500 07/01/25 1515 07/01/25 1530   BP: 125/74 (!) 146/79 116/68 (!) 127/49   Pulse: (!) 47 (!) 46 (!) 46 (!) 46   Resp: 15 16 17 22   Temp:       TempSrc:       SpO2: 100% 100% 100% 94%   Weight:       Height:           General Appearance: Alert, no acute distress  Neuro: A&Ox3, no focal deficits  Chest/Lungs:  normal effort  Cardiovascular: stable bradycardia  Abdomen: Soft, appropriately tender, non-distended, incisions c/d/I with glue  Extremities: no edema, no cyanosis    ASSESSMENT/PLAN:  This is a 70 y.o. year old male status post laparoscopic assistance with  shunt secondary to hydrocephalus 7/1    Recommend [facility]  Ok to shower per general surgery standpoint. Do not soak for 2 weeks while skin glue in place   Further management per primary team     Candi Carrera MD  PGY5, General Surgery  07/01/25  3:56 PM  [employer]  [phone]        
Neurosurgery Progress Note    2025 10:14 AM                               Elio Almodovar                      LOS: 4 days               Subjective:  No acute events overnight. Patient has no specific complaints this am.  Shunt reprogrammed yesterday by neurocrit care Np. Shunt now at 160                                               Physical Exam:    Temp (24hrs), Av.8 °F (36.6 °C), Min:97.3 °F (36.3 °C), Max:98.2 °F (36.8 °C)        Musculoskeletal:   Gait: Not tested   Tone: normal   Sensory: intact to light touch   Motor strength: Requires repeated cues at times    Right  Left      Right  Left    Deltoid  4 4   Hip Flex  4 4   Biceps  4 4   Knee Extensors  4 4   Triceps  4 4   Knee Flexors  4 4   Wrist Ext  4 4   Ankle Dorsiflex.  4 4   Wrist Flex  4 4   Ankle Plantarflex.  4 4   Handgrip  4 4   Ext Ravinder Longus  4 4   Thumb Ext  4 4                  Labs:  No results for input(s): \"WBC\", \"HGB\", \"HCT\", \"PLT\" in the last 72 hours.    Recent Labs     25  0830      K 4.1      CO2 23   BUN 27*   CREATININE 1.2   GLUCOSE 67*   CALCIUM 9.4       No results for input(s): \"PROTIME\", \"INR\", \"APTT\" in the last 72 hours.        71 yo male POD#5 s/p  right ventriculoperitoneal shunt by Dr. Diaz.  Small cute infarct in right frontal lobe      Plan:  Neurologic exam frequency: q 4     PT/OT, activity as tolerated  Diet: ADAT   Antibiotics: post-op ancef complete   Keller: removed, voiding   DVT Prophylaxis: SCDs, SQ heparin  GI Prophylaxis: protonix   Bowel Regimen: senokot, glycolax  Pain control: tylenol, morphine, roxicodone   Incisional Care: cleanse daily with soap/water, pat dry with clean towel and paint with CHG swab  Hospitalist consulted for medical management  Cleared by psych who recommends outpatient follow-up  Dispo Planning: UZMA Forde PA-C   
Occupational Therapy  Chart reviewed.  Pt declining therapy secondary to c/o 10/10 headache.  Nurse aware.  Will follow up later today as schedule permits.  DENISE Linda, OTR/L [id number]   
Occupational Therapy  Facility/Department: [facility] 5T ORTHO/NEURO  Occupational Therapy Daily Treatment    Name: Elio Almodovar  : 1955  MRN: [id number]  Date of Service: 2025    Discharge Recommendations:  IP Rehab  OT Equipment Recommendations  Equipment Needed: Yes  Mobility Devices: ADL Assistive Devices  ADL Assistive Devices: Shower Chair with back       Patient Diagnosis(es): There were no encounter diagnoses.  Past Medical History:  has a past medical history of Cancer (HCC), Diabetes mellitus (HCC), History of alcohol abuse, History of blood transfusion, History of pancreatitis, Hypertension, and NPH (normal pressure hydrocephalus) (HCC).  Past Surgical History:  has a past surgical history that includes Prostatectomy; Appendectomy; Tonsillectomy; and Ventriculoperitoneal shunt (Right, 2025).    Treatment Diagnosis: decreased independence with ADLs and fx mobility      Assessment  Performance deficits / Impairments: Decreased ADL status;Decreased strength;Decreased safe awareness;Decreased endurance;Decreased coordination;Decreased fine motor control;Decreased balance;Decreased posture;Decreased cognition;Decreased high-level IADLs;Decreased functional mobility ;Decreased ROM  Assessment: Pt is POD #3 following shunt placement. Pt continues to be pleasant and agreeable to therapy. He is currently mobilizing fairly well with CGA for transfers and ambulation with RW. Pt is greatly limited by cognitive deficits including but not limited to delayed processing and poor attention to task. Pt often struggles to follow verbal commands that require motor planning componants. Pt frequently stopping while ambulating requiring cues to continue. Pt often masking deficits with humor. Pt from rehab however requesting home DC. Pt would greatly benefit from further therapy for improved attention to task for participation in ADLs. If pt to DC home would require 24 hour assist to ensure completion and thoroughness 
Occupational Therapy  Occupational Therapy  Daily Treatment Note  Patient Name: Elio Almodovar  MRN: [id number]    Chart Reviewed: Yes       Other Position/Activity Restrictions: ambulate, activity as tolerated     Additional Pertinent Hx: Pt is 70 y.o. male s/p RIGHT VENTRICULOPERITONEAL SHUNT PLACEMENT WITH LAPAROSCOPIC ASSIST 7/1. PMHx: prostate cancer, NPH, HTN. pancreatitis, diabetes        Diagnosis: Normal pressure hydrocephalus  Treatment Diagnosis: decreased independence with ADLs and fx mobility    Subjective: Pt supine in bed upon entry, pleasant and agreeable to therapy session.    Pain: 8/10 headache, pt already received pain meds per RN    Social/Functional History  Lives With: Spouse  Type of Home: House  Home Layout: Multi-level, Able to Live on Main level with bedroom/bathroom  Home Access: Stairs to enter with rails  Entrance Stairs - Number of Steps: 4 MICK  Entrance Stairs - Rails: Both  Bathroom Shower/Tub: Tub/Shower unit, Walk-in shower  Bathroom Toilet: Handicap height  Bathroom Equipment: Grab bars in shower, Grab bars around toilet  Bathroom Accessibility: Accessible  Home Equipment: Cane, Reacher  Has the patient had two or more falls in the past year or any fall with injury in the past year?: Unknown (\"I think so\")  Receives Help From: Family (previously in rehab)  Prior Level of Assist for ADLs: Independent  Prior Level of Assist for Homemaking: Needs assistance (assistance from wife)  Prior Level of Assist for Ambulation: Independent in home with wheelchair and able to pivot transfer, Independent household ambulator, with or without device (been in a wheelchair for the past year for long distances - able to walk short distances)  Prior Level of Assist for Transfers: Independent  Active :  (transportation via nursing home)  Occupation: Retired  Type of Occupation:   Leisure & Hobbies: playing the [employer]  Additional Comments: wife is handicapped - unable to provide 
Occupational Therapy  Occupational Therapy  Daily Treatment Note  Patient Name: Elio Almodovar  MRN: [id number]    Chart Reviewed: Yes       Other Position/Activity Restrictions: ambulate, activity as tolerated     Additional Pertinent Hx: Pt is 70 y.o. male s/p RIGHT VENTRICULOPERITONEAL SHUNT PLACEMENT WITH LAPAROSCOPIC ASSIST 7/1. PMHx: prostate cancer, NPH, HTN. pancreatitis, diabetes        Diagnosis: Normal pressure hydrocephalus  Treatment Diagnosis: decreased independence with ADLs and fx mobility    Subjective: Pt met supine in bed and agreeable to OT session. Pt with urine soaked bedding and seemed to be unaware.     Pain: no pain reported    Social/Functional History  Lives With: Spouse  Type of Home: House  Home Layout: Multi-level, Able to Live on Main level with bedroom/bathroom  Home Access: Stairs to enter with rails  Entrance Stairs - Number of Steps: 4 MICK  Entrance Stairs - Rails: Both  Bathroom Shower/Tub: Tub/Shower unit, Walk-in shower  Bathroom Toilet: Handicap height  Bathroom Equipment: Grab bars in shower, Grab bars around toilet  Bathroom Accessibility: Accessible  Home Equipment: Cane, Reacher  Has the patient had two or more falls in the past year or any fall with injury in the past year?: Unknown (\"I think so\")  Receives Help From: Family (previously in rehab)  Prior Level of Assist for ADLs: Independent  Prior Level of Assist for Homemaking: Needs assistance (assistance from wife)  Prior Level of Assist for Ambulation: Independent in home with wheelchair and able to pivot transfer, Independent household ambulator, with or without device (been in a wheelchair for the past year for long distances - able to walk short distances)  Prior Level of Assist for Transfers: Independent  Active :  (transportation via nursing home)  Occupation: Retired  Type of Occupation:   Leisure & Hobbies: playing the [employer]  Additional Comments: wife is handicapped - unable to provide 
PACU Transfer to Floor Note  #[id number]    Procedure(s):  RIGHT VENTRICULOPERITONEAL SHUNT PLACEMENT WITH LAPAROSCOPIC ASSIST  .  Dr Emily King    Current Allergies: Patient has no known allergies.    Pt meets criteria as per Martin Score and ASPAN Standards to transfer to next phase of care.     Recent Labs     07/01/25  1137 07/01/25  1508   POCGLU 137* 100*       Vitals:    07/01/25 1700   BP: (!) 155/79   Pulse: 75   Resp: 19   Temp: 98.2 °F (36.8 °C)   SpO2: 99%     Vitals within 20% of pt's admission vitals as per MARTIN SCORE    SpO2: 99 %    O2 Flow Rate (L/min): 0 L/min      Intake/Output Summary (Last 24 hours) at 7/1/2025 1710  Last data filed at 7/1/2025 1700  Gross per 24 hour   Intake 1069 ml   Output 25 ml   Net 1044 ml     Tolerating ice chips      Pain assessment:  headaches    Pain Level: 0    Patient was assessed for alterations to skin integrity. There were not alterations observed.    Is patient incontinent: no    Handoff report given at bedside. Kim kapoor [facility] updated and directed to pt room by this rn  Transported with belongings by Federico      7/1/2025 5:10 PM  
Patient A&O x4, has some disorientation at times. VSS. Medications managed per MAR. Safety precautions in place, bedside table and call light within reach. Patient has  in room.  
Patient admitted for  shunt replacement on 7/1, with post op imaging showing ischemic stroke, unclear if post operative change or otherwise, warranting stroke work up by Neurology.     Patient's personal risk factors specific to stroke/TIA include: hypertension, obesity, diabetes, history of alcohol abuse.     Patient's chart reviewed for Stroke Core Measures and additional needs:    [x]   VTE prophylaxis   []   Antithrombotic (if applicable) - held until POD 14 (7/15/25)   [x]   Lipids / A1C ordered or resulted:  A1c 7.5, LDL 71   [x]   Therapy ordered   [x]   Care plan and Education template    Navigator to continue to follow patient while admitted, to assist with follow up and discharge planning as needed.     Nurse eSignature: Electronically signed by Kassandra Monroy RN on 7/7/25 at 5:35 PM EDT - Neuroscience Navigator    
Patient admitted to PACU # 12 from OR.  Attached to PACU monitoring system and report received from anesthesia provider.  Patient was reported to be hemodynamically stable during procedure.  Patient drowsy on admission, arouses easily to name. Oriented x3, following commands. PERRL. Denies pain or nausea. Frontal head drsg w/ evidence of shadow bloody drainage. Abd sites x3 w/surgical glue, no drainage. Cont to monitor.   
Patient admitted to room 55. Report received from PACU RN at bedside. VSS on room air. Patient is a/o x4. Patient oriented to room and call light. Rights and responsibilities provided to patient, and welcome packet provided to patient. 4 eyes skin assessment completed with 2nd RN.     Electronically signed by Hilary Bradley RN on 7/1/2025 at 7:32 PM   
Patient ano3-4, with intermittent confusion. Assist x1 gb / rw. Pt intermittently has difficulty following commands, requires repeated cues. Voiding BRP and incontinent briefs. Pain managed per MAR. Neuro unchanged from previous assessment. All fall / safety precautions in place. NAEO / POC cont.  
Patient back in room via transport.   
Patient given Dulcolax suppository.   Successfully had a partially liquid, partially formed, medium BM.   
Patient is A/Ox3-4. Intermittent confusion noted, easily reoriented. VSS, on RA. Surgical pain to right, upper side of head controlled with PRN pain medications per the MAR. Incisions to R side of head remain GARRETT and closed with staples, no drainage noted. ABD incisions remain GARRETT, no drainage noted.   NIH is a 1 for left leg drift. No acute neuro changes throughout shift.   Tolerating diet well but endorsing decreased appetite. Denies n/v. Voiding without complications. Ambulating x1 assist with walker and gait belt. Fall precautions in place. Bed locked in lowest position with alarm on. Call light within reach. Hourly rounding by RN. Plan of care continues.   
Patient is A/Ox3-4. VSS, on RA. No acute neuro changes throughout shift. NIH remains a 1. Endorsing HA pain that's not controlled well with PRN oxy. Gabapentin started by neurology team.   Surgical sites to R side of head and upper abdomen remain GARRETT, no drainage noted.   Tolerating diet well, denies n/v. Voiding without complications, can be incontinent at times. Ambulating x1 assist with walker and gait belt. Fall precautions in place. Bed locked in lowest position with alarm on. Call light within reach. Hourly rounding by RN. Plan of care continues.   
Patient is alert and oriented x4, disoriented at times. VSS on room air. Medications given per MAR, no side effects noted. Patient ambulating x1 with gait belt and walker. Pt got orthostatic this shift while sitting in chair after working with PT/OT, fluids given per MAR, BP improved, pt remained asymptomatic throughout, NP notified. Complaints of pain to head/ surgical site, pain being managed per MAR, no side effects noted. Voiding well via BRP, no bm this shift.      Patient is currently resting in bed with bed alarm on for safety. Call light within reach and all fall precautions in place. Plan of care continues.    Electronically signed by Hilary Bradley RN on 7/2/2025 at 6:03 PM   
Patient off floor to CT via transport.   
Physical Therapy  Facility/Department: [facility] ORTHO/NEURO  Physical Therapy Initial Assessment / Treatment    Name: Elio Almodovar  : 1955  MRN: [id number]  Date of Service: 2025    Discharge Recommendations:  IP Rehab   PT Equipment Recommendations  Equipment Needed: Yes (Rolling Walker)      Patient Diagnosis(es): There were no encounter diagnoses.  Past Medical History:  has a past medical history of Cancer (HCC), Diabetes mellitus (HCC), History of alcohol abuse, History of blood transfusion, History of pancreatitis, Hypertension, and NPH (normal pressure hydrocephalus) (HCC).  Past Surgical History:  has a past surgical history that includes Prostatectomy; Appendectomy; Tonsillectomy; and Ventriculoperitoneal shunt (Right, 2025).    Assessment  Body Structures, Functions, Activity Limitations Requiring Skilled Therapeutic Intervention: Decreased functional mobility ;Decreased safe awareness  Assessment: Pt is 70 y.o. male s/p R ventriculoperitoneal shunt placement with laparoscopic assist. Pt from skilled facility, reports using w/c for long distances for the past year. Pt demos decreased functional mobility, requiring assist of one to amb with RW for safety this session. Pt requires max verbal cues to stay on task and perform mobility. Would benefit from skilled therapies to improve safety with functional mobility and transfers to maximize independence. Pt demos decreased awareness of deficits, impacting pt safety. Wife is unable to provide physical assist. Rec ARU. Will continue to follow.  Treatment Diagnosis: decreased functional mobility  Therapy Prognosis: Good  Decision Making: Medium Complexity  Requires PT Follow-Up: Yes  Activity Tolerance  Activity Tolerance: Patient limited by endurance;Treatment limited secondary to decreased cognition  Activity Tolerance Comments: Pt required max cues throughout amb trials, noted slow and effortful gait. Pt required seated rest break in bathroom 
Physical Therapy  Facility/Department: [facility] ORTHO/NEURO  Daily Treatment Note  NAME: Elio Almodovar  : 1955  MRN: [id number]    Date of Service: 2025    Discharge Recommendations:  IP Rehab   PT Equipment Recommendations  Equipment Needed: Yes  Mobility Devices: Walker  Walker: Rolling    Patient Diagnosis(es): The encounter diagnosis was Cerebrovascular accident (CVA), unspecified mechanism (HCC).    Assessment  Assessment: Patient tolerated session well; however, functional independence is limited by decreased safety awareness, poor insight into deficits, a posterior lean during all mobility, and decreased standing balance during gait training. A severe loss of balance was noted when backing up, with no righting reactions observed. The patient was dependent on the clinician to prevent a fall. The patient expressed concern about returning home due to current deficits and is open to pursuing rehabilitation.     Equipment Needed: Yes  Mobility Devices: Walker    Plan  Physical Therapy Plan  General Plan: 5-7 times per week    Restrictions  Position Activity Restriction  Other Position/Activity Restrictions: ambulate, activity as tolerated     Subjective   Subjective  Subjective: Pt was in bed willing to participate  Pain: moderate incisional pain; left side of head.    Objective    Bed Mobility Training  Bed Mobility Training: Yes  Supine to Sit: Minimal assistance posterior lean  Scooting: Contact guard assistance (posterior lean)  Balance  Sitting: Impaired  Sitting - Static: Fair (occasional)  Sitting - Dynamic: Fair (occasional)  Standing: Impaired  Standing - Static: Fair  Standing - Dynamic: Poor posterior lean  Transfer Training  Transfer Training: Yes  Sit to Stand: Minimal assistance. posterior lean  Stand to Sit: Partial/Moderate assistance. posterior lean    Gait  Gait Training: Yes  Overall Level of Assistance: Contact guard assistance up to Substantial/Maximal assistance when backing up. 
Pt A&Ox4 oriented/disoriented at times, VSS on RA. Pt ambulates x1 walker/GB. Pt voiding and tolerating PO fluids. Managing pain with PRN pain meds per MAR. Fall precautions in place. Bed alarm on and in lowest position. Plan of care continues. Incision clean, dry, intact. No acute neuro changes noted.   
Pt A&Ox4, VSS on RA, with bradycardia. Pt ambulates x1 walker/GB. Pt voiding and tolerating PO fluids. Managing pain with PRN pain meds per MAR. Fall precautions in place. Bed alarm on and in lowest position. Plan of care continues.   
Pt A&Ox4, VSS on RA, with bradycardia. Pt ambulates x1 walker/GB. Pt voiding and tolerating PO fluids. Managing pain with PRN pain meds per MAR. Fall precautions in place. Bed alarm on and in lowest position. Plan of care continues.         
Pt ano3-4 with intermittent confusion. Assist x1. Voiding incontinent briefs, BRP. Surgical incisions CDI, old drainage noted. Pain managed per MAR. Neuro remains unchanged. All fall/ safety precautions in place. NAEO. POC cont.  
Pt is alert and oriented. VSS with occasional soft B/P's. Medicated for pain as needed, see eMar. Neuro checks WDL with some delayed responses. Pt incontinent of urine, check and changes performed frequently. Position pt with pillows. All safety measures in place. Will continue to monitor.  
Spoke with wife for interview, spoke with Nestor at [facility], will fax med list and get h&p done Monday and will fax    Spoke to Vince at pcp, will fax EKG,     Spoke with cardio office to fax clr per pcp note  
[facility] - Acute Rehab Unit   After review, this patient is felt to be:       []  Dr. Sims states this patient is appropriate for rehab.     []  Not appropriate for Acute Inpatient Rehab    [x]  Referral received and ARU reviewing patient     Patient continues to want to go home with [facility] and not rehab at this time.   Will notify CM/SW with further updates. Thank you for the referral.    Martha BOOTH, OTR/L  Clinical Liaison- [facility]   (P): [phone]  (F): [phone]      
VSS, afebrile. Alert and oriented with intermittent confusion. PRN pain medication given with relief noted. No acute events overnight. All fall & safety precautions in place. Call light within reach. Continue current plan of care.    
VSS, afebrile. Alert and oriented. No c/o pain at this time, will continue to monitor and give interventions as indicated. Pt did not tolerate ambulation well, had to have x2 asst and patient increasing unsteady. All fall & safety precautions in place. Call light within reach. Continue current plan of care.    
from a Chair: Level of difficulty - A Little  Supine to Sit: Level of difficulty - A Little     Bed to Chair: Physical Assistance Required - A Lot  Ambulate Across Room: Physical Assistance Required - A Lot  Ascend 3-5 Steps With HR: Physical Assistance Required - A Lot    OT    Eating: Physical Assistance Required - A Little  Grooming: Physical Assistance Required - A Little  LB Dressing: Physical Assistance Required - A Lot  UB Dressing: Physical Assistance Required - A Little  Bathing: Physical Assistance Required - A Little  Toileting: Physical Assistance Required - A Little    SLP         Body mass index is 30.59 kg/m².    Assessment:  Patient Active Problem List   Diagnosis    Hydrocephalus (HCC)    Hypertensive emergency    Normal pressure hydrocephalus (HCC)    Depression with suicidal ideation       Plan:     Patient requests discharge home  [facility] ARU will sign off    Thank you for this consult. Please contact me with any questions or concerns.      William Sims D.O. M.P.H  PM&R  7/8/2025  9:14 AM      * This document was created using dictation software.  While all precautions were taken to ensure accuracy, errors may have occurred.  Please disregard any typographical errors.    
intake prior to admission    Nutrition Monitoring and Evaluation:   Food/Nutrient Intake Outcomes:  Food and Nutrient Intake, Supplement Intake  Physical Signs/Symptoms Outcomes:  Biochemical Data, Nutrition Focused Physical Findings, Skin, Weight     OBJECTIVE DATA: Significant to nutrition assessment  Nutrition Related Findings: LBM pta; glycolax & seno-S scheduled. Labs from 7/1 reviewed; genevieve LOPEZL. POCG 100-247. HbA1c 7.8% 7/1/25.  Wounds: Multiple, Surgical Incision  Nutrition Goals: PO intake 50% or greater, by next RD assessment     CURRENT NUTRITION THERAPIES  ADULT DIET; Regular; 4 carb choices (60 gm/meal); Safety Tray; Safety Tray (Disposables)  ADULT ORAL NUTRITION SUPPLEMENT; HS Snack, Lunch, Breakfast; Diabetic Oral Supplement  PO Intake: 51-75%, %   PO Supplement Intake:Unable to assess  Additional Sources of Calories/IVF:n/a     COMPARATIVE STANDARDS  Energy (kcal):  1550 - 1733 (17 - 19 kcal/kg CBW)     Protein (g):  77 - 98 (1.1 - 1.4 gm/kg IBW)       Fluid (ml/day):  1 ml/kcal    ANTHROPOMETRICS  Current Height: 172.7 cm (5' 7.99\")  Current Weight - Scale: 91.2 kg (201 lb 1.9 oz)    Admission weight: 90.7 kg (200 lb)    The patient will be monitored per nutrition standards of care. Consult dietitian if additional nutrition interventions are needed prior to RD reassessment.     Charu Manley RD  Josef:  [phone]       
strength: Requires repeated cues at times   Right  Left    Right  Left    Deltoid  4 4  Hip Flex  4 4   Biceps  4 4  Knee Extensors  4 4   Triceps  4 4  Knee Flexors  4 4   Wrist Ext  4 4  Ankle Dorsiflex.  4 4   Wrist Flex  4 4  Ankle Plantarflex.  4 4   Handgrip  4 4  Ext Ravinder Longus  4 4   Thumb Ext  4 4         Incision: Staples GARRETT c/d/i    Respiratory:  Unlabored respiratory pattern    Abdomen:   Soft, ND   Last BM pre op    Cardiovascular:  Warm, well perfused    Assessment   71 yo male POD2 s/p right ventriculoperitoneal shunt by Dr. Diaz.    Plan:  Neurologic exam frequency: q 4   STAT MRI brain to further evaluate possible ischemia versus edema on repeat CT head dated 7/3/25  Mobility: PT/OT, activity as tolerated  Diet: ADAT   Antibiotics: post-op ancef complete   Keller: removed, voiding   DVT Prophylaxis: SCDs, SQ heparin  GI Prophylaxis: protonix   Bowel Regimen: senokot, glycolax  Pain control: tylenol, morphine, roxicodone   Incisional Care: cleanse daily with soap/water, pat dry with clean towel and paint with CHG swab  Hospitalist consulted for medical management  Cleared by psych who recommends outpatient follow-up  Dispo Planning: Remain inpatient, await MRI     Patient was discussed with Dr. Diaz who agrees with above assessment and plan.     Electronically signed by: Cristina Castro PA-C, 7/3/2025 3:57 PM   Neurosurgery [facility]  [phone]    
by anyone? : Patient unable to answer     Within the past 12 months, have you been humiliated or emotionally abused by anyone? : Patient unable to answer   Housing Stability: Low Risk  (7/1/2025)    Housing Stability Vital Sign     Unable to Pay for Housing in the Last Year: No     Number of Times Moved in the Last Year: 1     Homeless in the Last Year: No         Personally Reviewed Medications:   Medications:    traZODone  150 mg Oral Nightly    melatonin  10 mg Oral Nightly    insulin glargine  20 Units SubCUTAneous Nightly    sennosides-docusate sodium  2 tablet Oral BID    heparin (porcine)  5,000 Units SubCUTAneous 3 times per day    FLUoxetine  20 mg Oral Daily    prazosin  1 mg Oral Nightly    calcium carbonate  500 mg Oral Daily    [Held by provider] lisinopril  20 mg Oral Daily    [Held by provider] metFORMIN  500 mg Oral BID WC    metoprolol succinate  50 mg Oral Daily    methylphenidate  5 mg Oral BID    pantoprazole  20 mg Oral BID    sodium chloride flush  5-40 mL IntraVENous 2 times per day    acetaminophen  650 mg Oral Q6H    polyethylene glycol  17 g Oral Daily    insulin lispro  0-4 Units SubCUTAneous 4x Daily AC & HS      Infusions:    sodium chloride      dextrose       PRN Meds: sodium chloride flush, 5-40 mL, PRN  sodium chloride, , PRN  ondansetron, 4 mg, Q8H PRN   Or  ondansetron, 4 mg, Q6H PRN  oxyCODONE, 5 mg, Q4H PRN   Or  oxyCODONE, 10 mg, Q4H PRN  morphine, 2 mg, Q2H PRN   Or  morphine, 4 mg, Q2H PRN  glucose, 4 tablet, PRN  dextrose bolus, 125 mL, PRN   Or  dextrose bolus, 250 mL, PRN  glucagon (rDNA), 1 mg, PRN  dextrose, , Continuous PRN        Labs and Imaging   No results found.    CBC:   Recent Labs     07/01/25  1130   WBC 10.2   HGB 14.7        BMP:    Recent Labs     07/01/25  1130      K 4.8      CO2 29   BUN 18   CREATININE 1.1   GLUCOSE 151*     Hepatic: No results for input(s): \"AST\", \"ALT\", \"BILITOT\", \"ALKPHOS\" in the last 72 hours.    Invalid input(s): 
support (use of 2WW; CGA- min A)  Toilet Transfers  Toilet - Technique: Ambulating  Equipment Used: Grab bars  Toilet Transfer: Minimal assistance  AROM: Generally decreased, functional (limited shoulder flexion/extension)  Strength: Generally decreased, functional  Coordination: Generally decreased, functional  Tone: Normal  Sensation: Intact  ADL  Feeding: Independent  Grooming: Contact guard assistance  Grooming Skilled Clinical Factors: to wash face and hands at sink- forward lean across sink  UE Dressing: Setup;Minimal assistance  UE Dressing Skilled Clinical Factors: to exchange a gown  LE Dressing: Moderate assistance  LE Dressing Skilled Clinical Factors: to don new brief and doff existing diaper  Toileting: Minimal assistance  Toileting Skilled Clinical Factors: able to complete bee hygiene; required min A for brief exchange  Functional Mobility: Minimal assistance  Functional Mobility Skilled Clinical Factors: min Ax1 w/ 2WW to ambulate from bed to bathroom and from bathroom to recliner, slow shuffled steps, needs maximal cues throughout     Activity Tolerance  Activity Tolerance: Patient limited by endurance;Treatment limited secondary to decreased cognition  Activity Tolerance Comments: Pt required max cues throughout amb trials, noted slow and effortful gait. Pt required seated rest break in bathroom post-amb trial. Able to perform 2nd amb trial, no reports of SOB, dizziness or nausea.  Bed mobility  Supine to Sit: Contact guard assistance (HOB elevated)  Scooting: Contact guard assistance (scoot hips forward to EOB and hips back in recliner chair)  Transfers  Sit to stand: Minimal assistance  Stand to sit: Minimal assistance  Vision  Vision: Impaired  Vision Exceptions: Wears glasses for reading  Hearing  Hearing: Within functional limits  Cognition  Overall Cognitive Status: Exceptions  Arousal/Alertness: Appropriate responses to stimuli  Following Commands: Follows one step commands with increased 
     1. The orientation is reversed, findings will be discussed with referring   physician.            Electronically signed by Jaime Browning      XR SHUNT SERIES PLACEMENT (<4 VIEWS)   Final Result      1. No evidence of shunt disconnection or kink.      Electronically signed by Keith Vu MD      MRI BRAIN WO CONTRAST   Final Result      Acute zone of infarction in the right anterior frontal lobe.      Cardiomegaly, with ventriculostomy catheter in place.      Electronically signed by James Knowles DO      CT HEAD WO CONTRAST   Final Result      Hydrocephalus, with a right frontal approach ventriculoperitoneal shunt   catheter.      Increased low-attenuation about the catheter in the right frontal lobe.   Postprocedural edema or infarct can have this appearance.      No other acute intracranial findings.            Electronically signed by Ashley Castellanos      CT HEAD WO CONTRAST   Final Result   Impression:       1. Hydrocephalus with interval placement of ventriculoperitoneal catheter.   2. Mild cerebral parenchymal volume loss.   3. Pneumocephalus likely postprocedural.   4. Mild low-attenuation white matter which could be related to chronic small vessel ischemic changes.   5. No evidence of recent intracranial hemorrhage.      Electronically signed by MD Cait Huang, APRN - CNP    NOTE:  This report was transcribed using voice recognition software.  Every effort was made to ensure accuracy; however, inadvertent computerized transcription errors may be present.   
input(s): \"AST\", \"ALT\", \"BILITOT\", \"ALKPHOS\" in the last 72 hours.    Invalid input(s): \"ALB\"  Lipids: No results found for: \"CHOL\", \"HDL\", \"TRIG\"  Hemoglobin A1C:   Lab Results   Component Value Date/Time    LABA1C 7.8 07/01/2025 11:30 AM     TSH: No results found for: \"TSH\"  Troponin: No results found for: \"TROPONINT\"  Lactic Acid: No results for input(s): \"LACTA\" in the last 72 hours.  BNP: No results for input(s): \"PROBNP\" in the last 72 hours.  UA:  Lab Results   Component Value Date/Time    NITRU Negative 02/27/2024 12:44 AM    COLORU Yellow 02/27/2024 12:44 AM    PHUR 6.0 02/27/2024 12:44 AM    CLARITYU Clear 02/27/2024 12:44 AM    LEUKOCYTESUR Negative 02/27/2024 12:44 AM    UROBILINOGEN 0.2 02/27/2024 12:44 AM    BILIRUBINUR Negative 02/27/2024 12:44 AM    BLOODU Negative 02/27/2024 12:44 AM    GLUCOSEU Negative 02/27/2024 12:44 AM    KETUA 15 02/27/2024 12:44 AM     Urine Cultures: No results found for: \"LABURIN\"  Blood Cultures:   Lab Results   Component Value Date/Time    BC No Growth after 4 days of incubation. 02/27/2024 03:08 AM     Lab Results   Component Value Date/Time    BLOODCULT2 No Growth after 4 days of incubation. 02/27/2024 03:06 AM     Organism: No results found for: \"ORG\"      Electronically signed by AMINAH Connors CNP on 7/4/2025 at 11:10 AM

## 2025-07-09 NOTE — DISCHARGE SUMMARY
07/09/2025 09:49 AM    LABGLOM 65 07/09/2025 09:49 AM    LABGLOM >60 02/28/2024 03:41 AM    GLUCOSE 236 07/09/2025 09:49 AM    CALCIUM 9.0 07/09/2025 09:49 AM       Discharge Medications:  The patient suffers from a major neurological surgery that pain cannot be managed within an average of 30 MED per day. Severe acute postoperative pain is the reason for exceeding the 30 MED average, and the prescription reflects the same dosage patient received while inpatient, which is the lowest dose consistent with the patient’s medical condition. Non-opioid treatment options have been considered prior to prescribing opioids, and the patient has been advised of the benefits and risks of the opioid (including the potential for addiction).     Medication List        START taking these medications      atorvastatin 40 MG tablet  Commonly known as: LIPITOR  Take 1 tablet by mouth nightly     FLUoxetine 20 MG capsule  Commonly known as: PROZAC  Take 1 capsule by mouth daily  Start taking on: July 10, 2025     gabapentin 100 MG capsule  Commonly known as: NEURONTIN  Take 1 capsule by mouth 3 times daily for 30 days.     heparin (porcine) 5000 UNIT/ML injection  Inject 1 mL into the skin every 8 hours for 10 days     melatonin 5 MG Tbdp disintegrating tablet  Take 2 tablets by mouth nightly     oxyCODONE 5 MG immediate release tablet  Commonly known as: ROXICODONE  Take 1-2 tablets by mouth every 6 hours as needed for Pain for up to 7 days. Max Daily Amount: 40 mg     polyethylene glycol 17 g packet  Commonly known as: GLYCOLAX  Take 1 packet by mouth daily  Start taking on: July 10, 2025     prazosin 1 MG capsule  Commonly known as: MINIPRESS  Take 1 capsule by mouth nightly     sennosides-docusate sodium 8.6-50 MG tablet  Commonly known as: SENOKOT-S  Take 2 tablets by mouth in the morning and at bedtime            CONTINUE taking these medications      acetaminophen 325 MG tablet  Commonly known as: TYLENOL     calcium carbonate

## 2025-07-09 NOTE — DISCHARGE INSTR - COC
(July 14). Cleanse incision daily with soap/water, pat dry with clean towel.follow up with Dr. Diaz in 2 weeks for post op check up.    Physician Certification: I certify the above information and transfer of Elio Almodovar  is necessary for the continuing treatment of the diagnosis listed and that he requires Acute rehab for less 30 days.     Update Admission H&P: No change in H&P    PHYSICIAN SIGNATURE:  Electronically signed by AMINAH Grimes CNP on 7/9/25 at 12:04 PM EDT

## 2025-07-09 NOTE — PLAN OF CARE
Problem: Chronic Conditions and Co-morbidities  Goal: Patient's chronic conditions and co-morbidity symptoms are monitored and maintained or improved  Outcome: Progressing  Flowsheets (Taken 7/9/2025 1547)  Care Plan - Patient's Chronic Conditions and Co-Morbidity Symptoms are Monitored and Maintained or Improved: Monitor and assess patient's chronic conditions and comorbid symptoms for stability, deterioration, or improvement     Problem: Discharge Planning  Goal: Discharge to home or other facility with appropriate resources  Outcome: Progressing  Flowsheets (Taken 7/9/2025 1547)  Discharge to home or other facility with appropriate resources: Identify barriers to discharge with patient and caregiver     Problem: Safety - Adult  Goal: Free from fall injury  Outcome: Progressing  Flowsheets (Taken 7/9/2025 1549)  Free From Fall Injury: Instruct family/caregiver on patient safety     Problem: Pain  Goal: Verbalizes/displays adequate comfort level or baseline comfort level  Outcome: Progressing  Flowsheets (Taken 7/9/2025 1547)  Verbalizes/displays adequate comfort level or baseline comfort level: Encourage patient to monitor pain and request assistance     Problem: Skin/Tissue Integrity  Goal: Skin integrity remains intact  Description: 1.  Monitor for areas of redness and/or skin breakdown  2.  Assess vascular access sites hourly  3.  Every 4-6 hours minimum:  Change oxygen saturation probe site  4.  Every 4-6 hours:  If on nasal continuous positive airway pressure, respiratory therapy assess nares and determine need for appliance change or resting period  Outcome: Progressing  Flowsheets  Taken 7/9/2025 1547  Skin Integrity Remains Intact: Monitor for areas of redness and/or skin breakdown  Taken 7/9/2025 1547  Skin Integrity Remains Intact: Monitor for areas of redness and/or skin breakdown     Problem: ABCDS Injury Assessment  Goal: Absence of physical injury  Outcome: Progressing  Flowsheets (Taken 7/9/2025  1549)  Absence of Physical Injury: Implement safety measures based on patient assessment

## 2025-07-09 NOTE — PROGRESS NOTES
Ethnicity  \"Are you of , /a, or Vincentian origin?\"  Check all that apply:  [x] A.  No, not of , /a, or Vincentian Origin  [] B.  Yes, Togolese, Togolese American, Chicano/a  [] C.  Yes, South African  [] D.  Yes, Chirag  [] E.  Yes, another , , or Vincentian origin  [] X.  Patient unable to respond    Race  \"What is your race?\"  Check all that apply:  [x] A.  White  [] B.  Black or   [] C.   or   [] D.     [] E.  Chinese  [] F.  Belizean  [] G. Syrian  [] H.  Slovenian  [] I.  Belarusian  [] J.  Other   [] K.    [] L.  Azerbaijani or Maria Del Carmen  [] M.  Anguillan  [] N.  Other   [] X.  Patient unable to respond    High Risk Drug Classes:  Use and Indication    Is taking: Check if the pt is taking any medications by pharmacological classification, not how it is used, in the following classes  Indication noted: If column 1 is checked, check if there is an indication noted for all meds in the drug class Is taking  (check all that apply) Indication noted (check all that apply)   Antipsychotic [] []   Anticoagulant [x] [x]   Antibiotic [] []   Opioid [x] [x]   Antiplatelet [] []   Hypoglycemic (including insulin) [x] [x]   None of the above []     Special Treatments, Procedures, and Programs    Check all of the following treatments, procedures, and programs that apply on admission. On admission (check all that apply)   Cancer Treatments   A1. Chemotherapy []           A2. IV []           A3. Oral []           A10. Other []   B1. Radiation []   Respiratory Therapies   C1. Oxygen Therapy []           C2. Continuous []           C3. Intermittent []           C4. High-concentration []   D1. Suctioning []           D2. Scheduled []           D3. As needed []   E1. Tracheostomy Care []   F1. Invasive Mechanical Ventilator (ventilator or respirator) []   G1. Non-invasive Mechanical Ventilator []           G2. BiPAP []

## 2025-07-09 NOTE — CARE COORDINATION
Case Management Assessment            Discharge Note                    Date / Time of Note: 7/9/2025 12:01 PM                  Discharge Note Completed by: Kenia Isabel RN    Patient Name: Elio Almodovar   YOB: 1955  Diagnosis: (Idiopathic) normal pressure hydrocephalus (HCC) [G91.2]  Normal pressure hydrocephalus (HCC) [G91.2]   Date / Time: 7/1/2025 10:49 AM    Current PCP: Cecil Smith MD  Clinic patient: No    Hospitalization in the last 30 days: No       Advance Directives:  Code Status: Full Code  [place] DNR form completed and on chart: Not Indicated    Financial:  Payor: [facility] / Plan: [facility] MEDICAID / Product Type: *No Product type* /      Pharmacy:    [id number] [address] - P [phone] - F [phone] [address] [address]  Phone: [phone] Fax: [phone]      Assistance purchasing medications?:    Assistance provided by Case Management: None at this time    Does patient want to participate in local refill/ meds to beds program?: Yes    Meds To Beds General Rules:  1. Can ONLY be done Monday- Friday between 8:30am-5pm  2. Prescription(s) must be in pharmacy by 3pm to be filled same day  3.Copy of patient's insurance/ prescription drug card and patient face sheet must be sent along with the prescription(s)  4. Cost of Rx cannot be added to hospital bill. If financial assistance is needed, please contact unit  or ;  or  CANNOT provide pharmacy voucher for patients co-pays  5. Patients can then  the prescription on their way out of the hospital at discharge, or pharmacy can deliver to the bedside if staff is available. (payment due at time of pick-up or delivery - cash, check, or card accepted)     Able to afford home medications/ co-pay costs: Yes    ADLS:  Current PT AM-PAC Score: 15 /24  Current OT AM-PAC Score: 17 /24    DISCHARGE 
CM following for discharge planning.  Patient and spouse plan for him to discharge home and have home care.  Home care referrals pending, has needed DME.    [facility] accepted.    Kenia Isabel, RN, BSN,    Ortho/Neuro   [phone]    
CM received a call from Ulysses at [facility].  They started pre-cert for acute rehab this morning with updated OT notes.      Kenia Isabel RN, BSN,    Ortho/Neuro   [phone]    
CM spoke with patient and wife over the phone.  They are agreeable to referral to [facility].  CM spoke with Ulysses at [facility] for the referral.  Will need a pre-cert.    [facility] accepted and will start pre-cert with updated therapy notes.    Kenia Isabel, RN, BSN,   5T Ortho/Neuro   [phone]    
discharge to: House  Plan for transportation at discharge: Family    Financial    Payor: [employer] / Plan: [employer] [place] MEDICAID / Product Type: *No Product type* /     Does insurance require precert for SNF: Yes    Potential assistance Purchasing Medications:    Meds-to-Beds request: Yes      [id number] - [place], [place] - [address] - P [phone] - F [phone] [address] [address]  Phone: [phone] Fax: [phone]      Notes:    Factors facilitating achievement of predicted outcomes: Family support    Barriers to discharge: Pain    Additional Case Management Notes: Patient is from home with spouse, independent pta.  CM spoke with pt's spouse Zeynep at bedside.  She would like to bring him home with [facility], does not want rehab facility.  Referrals pending for home care.    The Plan for Transition of Care is related to the following treatment goals of (Idiopathic) normal pressure hydrocephalus (HCC) [G91.2]  Normal pressure hydrocephalus (HCC) [G91.2]    IF APPLICABLE: The Patient and/or patient representative Elio and his family were provided with a choice of provider and agrees with the discharge plan. Freedom of choice list with basic dialogue that supports the patient's individualized plan of care/goals and shares the quality data associated with the providers was provided to: Patient Representative   Patient Representative Name: spouse Zeynep     The Patient and/or Patient Representative Agree with the Discharge Plan? Yes    Kenia Isabel RN  Case Management Department  Ph: [phone] Fax: [phone]

## 2025-07-09 NOTE — PROGRESS NOTES
4 Eyes Skin Assessment     NAME:  Elio Almodovar  YOB: 1955  MEDICAL RECORD NUMBER:  [id number]    The patient is being assessed for  Admission    I agree that at least one RN has performed a thorough Head to Toe Skin Assessment on the patient. ALL assessment sites listed below have been assessed.      Areas assessed by both nurses:    Head, Face, Ears, Shoulders, Back, Chest, Arms, Elbows, Hands, Sacrum. Buttock, Coccyx, Ischium, Legs. Feet and Heels, and Under Medical Devices       Incision with staples on head, blanchable redness on heels        Does the Patient have a Wound? No noted wound(s)       Cesar Prevention initiated by RN: Yes  Wound Care Orders initiated by RN: No    Pressure Injury (Stage 1,2,3,4, Unstageable, DTI, NWPT, and Complex wounds) if present, place Wound referral order by RN under : No    New Ostomies, if present place, Ostomy referral order under : No     Nurse 1 eSignature: Electronically signed by Elida Garcia RN on 7/9/25 at 4:42 PM EDT    **SHARE this note so that the co-signing nurse can place an eSignature**    Nurse 2 eSignature: Electronically signed by Jayashere Manuel RN on 7/9/25 at 4:46 PM EDT

## 2025-07-09 NOTE — PLAN OF CARE
Problem: Discharge Planning  Goal: Discharge to home or other facility with appropriate resources  Outcome: Progressing  Note: Planning for discharge later today.     Problem: ABCDS Injury Assessment  Goal: Absence of physical injury  Outcome: Progressing  Note: Patient has remained free of physical injury this shift. Fall and safety precautions in place. Bed exit alarm activated, bed in lowest position with wheels locked, call light and belongings within reach.

## 2025-07-09 NOTE — PROGRESS NOTES
A complete drug regimen review was completed for this patient.     [x]  No clinically significant medication issue was identified    []   Yes, a clinically significant medication issue was identified     []  Adverse Drug Event:      []  Allergy:      []  Side Effect:      []  Ineffective Therapy:      []  Drug Interaction:     []  Duplicated Therapy:     []  Untreated Indication:      []  Non-adherence:     []  Other:      Nursing/Pharmacy contacted the physician:      Actions recommended by physician were completed:     Electronically signed by Elida Garcia RN on 7/9/25 at 4:40 PM EDT

## 2025-07-09 NOTE — PROGRESS NOTES
ADMISSION ORIENTATION    Elio Almodovar  MEDICAL RECORD NUMBER:  [id number]  AGE: 70 y.o.   GENDER: male  : 1955  TODAYS DATE:  2025      Room Orientation     Patient admitted to room 3263 per [] Wheel Chair  [] Bed  [] Recliner  [x] Stretcher  [] Other: .     Transferred to:  [x] Bed  [] Recliner  [] Other: .     Patient Required moderate assistance with None    Patient was oriented to:  [x] Call Light  [x] Room Phone  [x] TV  [x] Thermostat  [x] Bathroom  [x] Bed and Chair Alarms per Rehab Policy  [x] Closet  [x] White Board and it's Use on Rehab  [] Other:    Patient Verbalized Understanding: Yes  Family Present: No      Rehab Orientation     [x] 3 Hours of Therapy  through   [x] Focus and Specialty of Physical, Occupational, and Speech and Language Pathology  [x] Weekly Team Conference and Discharge Planning  [x] Roles of the Rehab Doctor, Consulting Doctors, Nursing, Dietary, and Social Work  [x] Everyday Clothing, including proper footwear, for Therapy  [x] Visiting Hours, Visitor Ages, and Visitors Sleeping Overnight in the Hospital  [x] Visitor Participation in Therapy  [x]  and Sundays on Rehab  [x] Introduction of Welcome Folder, including Rehab Expectations, Nurse Manager's Welcome Letter, Visitor's Guide, and Menu Service  [x] Planning Ahead and Ordering Meals  [x] Falls Contract [x] Signed, [] Copied, and [] Taped to Closet Door  [] Other:    Patient Verbalized Understanding: Yes  Family Present: No    Electronically signed by Elida Garcia RN on 2025 at 4:39 PM

## 2025-07-09 NOTE — PLAN OF CARE
Problem: Discharge Planning  Goal: Discharge to home or other facility with appropriate resources  Outcome: Progressing     Problem: Safety - Adult  Goal: Free from fall injury  7/8/2025 2239 by Prachi Peters RN  Outcome: Progressing     Problem: Pain  Goal: Verbalizes/displays adequate comfort level or baseline comfort level  7/8/2025 2239 by Prachi Peters RN  Outcome: Progressing     Problem: Chronic Conditions and Co-morbidities  Goal: Patient's chronic conditions and co-morbidity symptoms are monitored and maintained or improved  Outcome: Progressing     Problem: Self Harm/Suicidality  Goal: Will have no self-injury during hospital stay  Description: INTERVENTIONS:  1.  Ensure constant observer at bedside with Q15M safety checks  2.  Maintain a safe environment  3.  Secure patient belongings  4.  Ensure family/visitors adhere to safety recommendations  5.  Ensure safety tray has been added to patient's diet order  6.  Every shift and PRN: Re-assess suicidal risk via Frequent Screener    Outcome: Progressing     Problem: ABCDS Injury Assessment  Goal: Absence of physical injury  Outcome: Progressing  Flowsheets (Taken 7/8/2025 2200)  Absence of Physical Injury: Implement safety measures based on patient assessment     Problem: Skin/Tissue Integrity  Goal: Skin integrity remains intact  Description: 1.  Monitor for areas of redness and/or skin breakdown  2.  Assess vascular access sites hourly  3.  Every 4-6 hours minimum:  Change oxygen saturation probe site  4.  Every 4-6 hours:  If on nasal continuous positive airway pressure, respiratory therapy assess nares and determine need for appliance change or resting period  Outcome: Progressing  Flowsheets (Taken 7/8/2025 2200)  Skin Integrity Remains Intact: Monitor for areas of redness and/or skin breakdown     Problem: Nutrition Deficit:  Goal: Optimize nutritional status  Outcome: Progressing     Problem: Neurosensory - Adult  Goal: Achieves stable or  No

## 2025-07-10 LAB
ANION GAP SERPL CALCULATED.3IONS-SCNC: 11 MMOL/L (ref 3–16)
BASOPHILS # BLD: 0.1 K/UL (ref 0–0.2)
BASOPHILS NFR BLD: 1 %
BUN SERPL-MCNC: 24 MG/DL (ref 7–20)
CALCIUM SERPL-MCNC: 9.3 MG/DL (ref 8.3–10.6)
CHLORIDE SERPL-SCNC: 103 MMOL/L (ref 99–110)
CO2 SERPL-SCNC: 25 MMOL/L (ref 21–32)
CREAT SERPL-MCNC: 1.2 MG/DL (ref 0.8–1.3)
DEPRECATED RDW RBC AUTO: 14.1 % (ref 12.4–15.4)
EKG ATRIAL RATE: 47 BPM
EKG DIAGNOSIS: NORMAL
EKG P AXIS: 43 DEGREES
EKG P-R INTERVAL: 178 MS
EKG Q-T INTERVAL: 450 MS
EKG QRS DURATION: 82 MS
EKG QTC CALCULATION (BAZETT): 398 MS
EKG R AXIS: 32 DEGREES
EKG T AXIS: 47 DEGREES
EKG VENTRICULAR RATE: 47 BPM
EOSINOPHIL # BLD: 0.2 K/UL (ref 0–0.6)
EOSINOPHIL NFR BLD: 3.6 %
GFR SERPLBLD CREATININE-BSD FMLA CKD-EPI: 65 ML/MIN/{1.73_M2}
GLUCOSE BLD-MCNC: 107 MG/DL (ref 70–99)
GLUCOSE BLD-MCNC: 151 MG/DL (ref 70–99)
GLUCOSE BLD-MCNC: 154 MG/DL (ref 70–99)
GLUCOSE BLD-MCNC: 198 MG/DL (ref 70–99)
GLUCOSE SERPL-MCNC: 103 MG/DL (ref 70–99)
HCT VFR BLD AUTO: 34.6 % (ref 40.5–52.5)
HGB BLD-MCNC: 11.6 G/DL (ref 13.5–17.5)
LYMPHOCYTES # BLD: 2.3 K/UL (ref 1–5.1)
LYMPHOCYTES NFR BLD: 38.9 %
MCH RBC QN AUTO: 30.4 PG (ref 26–34)
MCHC RBC AUTO-ENTMCNC: 33.6 G/DL (ref 31–36)
MCV RBC AUTO: 90.5 FL (ref 80–100)
MONOCYTES # BLD: 0.6 K/UL (ref 0–1.3)
MONOCYTES NFR BLD: 9.8 %
NEUTROPHILS # BLD: 2.8 K/UL (ref 1.7–7.7)
NEUTROPHILS NFR BLD: 46.7 %
PERFORMED ON: ABNORMAL
PLATELET # BLD AUTO: 166 K/UL (ref 135–450)
PMV BLD AUTO: 9.1 FL (ref 5–10.5)
POTASSIUM SERPL-SCNC: 4.4 MMOL/L (ref 3.5–5.1)
PREALB SERPL-MCNC: 17.3 MG/DL (ref 20–40)
RBC # BLD AUTO: 3.83 M/UL (ref 4.2–5.9)
SODIUM SERPL-SCNC: 139 MMOL/L (ref 136–145)
WBC # BLD AUTO: 6 K/UL (ref 4–11)

## 2025-07-10 PROCEDURE — 80048 BASIC METABOLIC PNL TOTAL CA: CPT

## 2025-07-10 PROCEDURE — 36415 COLL VENOUS BLD VENIPUNCTURE: CPT

## 2025-07-10 PROCEDURE — 93005 ELECTROCARDIOGRAM TRACING: CPT | Performed by: PHYSICAL MEDICINE & REHABILITATION

## 2025-07-10 PROCEDURE — 97116 GAIT TRAINING THERAPY: CPT

## 2025-07-10 PROCEDURE — 6360000002 HC RX W HCPCS: Performed by: PHYSICAL MEDICINE & REHABILITATION

## 2025-07-10 PROCEDURE — 92610 EVALUATE SWALLOWING FUNCTION: CPT

## 2025-07-10 PROCEDURE — 1280000000 HC REHAB R&B

## 2025-07-10 PROCEDURE — 84134 ASSAY OF PREALBUMIN: CPT

## 2025-07-10 PROCEDURE — 85025 COMPLETE CBC W/AUTO DIFF WBC: CPT

## 2025-07-10 PROCEDURE — 97535 SELF CARE MNGMENT TRAINING: CPT

## 2025-07-10 PROCEDURE — 97530 THERAPEUTIC ACTIVITIES: CPT

## 2025-07-10 PROCEDURE — 93010 ELECTROCARDIOGRAM REPORT: CPT | Performed by: INTERNAL MEDICINE

## 2025-07-10 PROCEDURE — 6370000000 HC RX 637 (ALT 250 FOR IP): Performed by: PHYSICAL MEDICINE & REHABILITATION

## 2025-07-10 PROCEDURE — 97166 OT EVAL MOD COMPLEX 45 MIN: CPT

## 2025-07-10 PROCEDURE — 97110 THERAPEUTIC EXERCISES: CPT

## 2025-07-10 PROCEDURE — 92523 SPEECH SOUND LANG COMPREHEN: CPT

## 2025-07-10 PROCEDURE — 94760 N-INVAS EAR/PLS OXIMETRY 1: CPT

## 2025-07-10 PROCEDURE — 97161 PT EVAL LOW COMPLEX 20 MIN: CPT

## 2025-07-10 RX ORDER — METOPROLOL SUCCINATE 25 MG/1
25 TABLET, EXTENDED RELEASE ORAL DAILY
Status: DISCONTINUED | OUTPATIENT
Start: 2025-07-11 | End: 2025-07-24

## 2025-07-10 RX ADMIN — HEPARIN SODIUM 5000 UNITS: 5000 INJECTION INTRAVENOUS; SUBCUTANEOUS at 21:16

## 2025-07-10 RX ADMIN — HEPARIN SODIUM 5000 UNITS: 5000 INJECTION INTRAVENOUS; SUBCUTANEOUS at 14:40

## 2025-07-10 RX ADMIN — FLUOXETINE HYDROCHLORIDE 20 MG: 20 CAPSULE ORAL at 07:33

## 2025-07-10 RX ADMIN — METHYLPHENIDATE HYDROCHLORIDE 5 MG: 10 TABLET ORAL at 07:32

## 2025-07-10 RX ADMIN — INSULIN LISPRO 1 UNITS: 100 INJECTION, SOLUTION INTRAVENOUS; SUBCUTANEOUS at 16:43

## 2025-07-10 RX ADMIN — INSULIN GLARGINE 20 UNITS: 100 INJECTION, SOLUTION SUBCUTANEOUS at 21:16

## 2025-07-10 RX ADMIN — GABAPENTIN 100 MG: 100 CAPSULE ORAL at 14:40

## 2025-07-10 RX ADMIN — OXYCODONE HYDROCHLORIDE 10 MG: 10 TABLET ORAL at 05:38

## 2025-07-10 RX ADMIN — GABAPENTIN 100 MG: 100 CAPSULE ORAL at 07:32

## 2025-07-10 RX ADMIN — SENNOSIDES AND DOCUSATE SODIUM 1 TABLET: 8.6; 5 TABLET ORAL at 07:32

## 2025-07-10 RX ADMIN — METHYLPHENIDATE HYDROCHLORIDE 5 MG: 10 TABLET ORAL at 14:41

## 2025-07-10 RX ADMIN — PANTOPRAZOLE SODIUM 20 MG: 20 TABLET, DELAYED RELEASE ORAL at 21:16

## 2025-07-10 RX ADMIN — OXYCODONE HYDROCHLORIDE 10 MG: 10 TABLET ORAL at 21:15

## 2025-07-10 RX ADMIN — ANTACID TABLETS 500 MG: 500 TABLET, CHEWABLE ORAL at 07:32

## 2025-07-10 RX ADMIN — SENNOSIDES AND DOCUSATE SODIUM 1 TABLET: 8.6; 5 TABLET ORAL at 21:15

## 2025-07-10 RX ADMIN — PRAZOSIN HYDROCHLORIDE 1 MG: 1 CAPSULE ORAL at 21:26

## 2025-07-10 RX ADMIN — GABAPENTIN 100 MG: 100 CAPSULE ORAL at 21:16

## 2025-07-10 RX ADMIN — PANTOPRAZOLE SODIUM 20 MG: 20 TABLET, DELAYED RELEASE ORAL at 07:32

## 2025-07-10 RX ADMIN — HEPARIN SODIUM 5000 UNITS: 5000 INJECTION INTRAVENOUS; SUBCUTANEOUS at 05:34

## 2025-07-10 RX ADMIN — ATORVASTATIN CALCIUM 40 MG: 40 TABLET, FILM COATED ORAL at 21:16

## 2025-07-10 RX ADMIN — OXYCODONE HYDROCHLORIDE 10 MG: 10 TABLET ORAL at 14:52

## 2025-07-10 RX ADMIN — Medication 9 MG: at 21:15

## 2025-07-10 ASSESSMENT — PAIN SCALES - GENERAL
PAINLEVEL_OUTOF10: 3
PAINLEVEL_OUTOF10: 8
PAINLEVEL_OUTOF10: 6
PAINLEVEL_OUTOF10: 5
PAINLEVEL_OUTOF10: 8

## 2025-07-10 ASSESSMENT — PAIN DESCRIPTION - ONSET
ONSET: ON-GOING
ONSET: ON-GOING

## 2025-07-10 ASSESSMENT — PAIN DESCRIPTION - LOCATION
LOCATION: HEAD

## 2025-07-10 ASSESSMENT — PAIN DESCRIPTION - DESCRIPTORS
DESCRIPTORS: SHARP
DESCRIPTORS: SHARP
DESCRIPTORS: ACHING
DESCRIPTORS: SHARP

## 2025-07-10 ASSESSMENT — PAIN - FUNCTIONAL ASSESSMENT
PAIN_FUNCTIONAL_ASSESSMENT: ACTIVITIES ARE NOT PREVENTED

## 2025-07-10 ASSESSMENT — PAIN DESCRIPTION - ORIENTATION
ORIENTATION: RIGHT
ORIENTATION: RIGHT
ORIENTATION: RIGHT;ANTERIOR
ORIENTATION: RIGHT

## 2025-07-10 ASSESSMENT — PAIN SCALES - WONG BAKER: WONGBAKER_NUMERICALRESPONSE: NO HURT

## 2025-07-10 ASSESSMENT — PAIN DESCRIPTION - PAIN TYPE
TYPE: ACUTE PAIN
TYPE: ACUTE PAIN

## 2025-07-10 ASSESSMENT — PAIN DESCRIPTION - FREQUENCY
FREQUENCY: INTERMITTENT
FREQUENCY: CONTINUOUS

## 2025-07-10 NOTE — CONSULTS
Comprehensive Nutrition Assessment    Type and Reason for Visit:  Consult    Nutrition Recommendations/Plan:   Continue current diet.     Malnutrition Assessment:  Malnutrition Status:  No malnutrition (07/10/25 0920)    Context:  Acute Illness     Findings of the 6 clinical characteristics of malnutrition:  Energy Intake:  No decrease in energy intake  Weight Loss:  Mild weight loss     Body Fat Loss:  No body fat loss     Muscle Mass Loss:  No muscle mass loss    Fluid Accumulation:  No fluid accumulation     Strength:  Not Performed    Nutrition Assessment:    RD consult for IP rehab. Pt. admitted for normal pressure hydrocephalus. Currently receiving a 4 carb diet. Diet acceptance appears adequate so far. He did have some questions regarding carb restriction. We briefly went over diet restrictions and their purpose. Denies ever being on carb controlled before. Possibly would benefit from diet education. Pt. working on lunch at the time of visit. No other needs identified at this time. Will continue to monitor nutritional adequacy and diet acceptance while inpatient.    Nutrition Related Findings:    . LBM 7/9. Wound Type: Multiple, Surgical Incision       Current Nutrition Intake & Therapies:    Average Meal Intake: 51-75%, %  Average Supplements Intake: Unable to assess  ADULT DIET; Regular; 4 carb choices (60 gm/meal); Safety Tray; Safety Tray (Disposables)  ADULT ORAL NUTRITION SUPPLEMENT; Dinner; Diabetic Oral Supplement    Anthropometric Measures:  Height: 172.7 cm (5' 7.99\")  Ideal Body Weight (IBW): 154 lbs (70 kg)       Current Body Weight: 89.1 kg (196 lb 6.9 oz), 127.6 % IBW.    Current BMI (kg/m2): 29.9           Weight Adjustment For: No Adjustment                 BMI Categories: Overweight (BMI 25.0-29.9)    Estimated Daily Nutrient Needs:  Energy Requirements Based On: Kcal/kg  Weight Used for Energy Requirements: Current  Energy (kcal/day): 1604-60314 kcal (18-22 kcal/kg)  Weight

## 2025-07-10 NOTE — PLAN OF CARE
Problem: Chronic Conditions and Co-morbidities  Goal: Patient's chronic conditions and co-morbidity symptoms are monitored and maintained or improved  Outcome: Progressing     Problem: Discharge Planning  Goal: Discharge to home or other facility with appropriate resources  Outcome: Progressing     Problem: Safety - Adult  Goal: Free from fall injury  7/10/2025 1217 by Lyric Escobar RN  Outcome: Progressing  7/9/2025 2334 by Cierra Owens RN  Outcome: Progressing     Problem: Pain  Goal: Verbalizes/displays adequate comfort level or baseline comfort level  7/10/2025 1217 by Lyric Escobar RN  Outcome: Progressing  7/9/2025 2334 by Cierra Owens RN  Outcome: Progressing     Problem: Skin/Tissue Integrity  Goal: Skin integrity remains intact  Description: 1.  Monitor for areas of redness and/or skin breakdown  2.  Assess vascular access sites hourly  3.  Every 4-6 hours minimum:  Change oxygen saturation probe site  4.  Every 4-6 hours:  If on nasal continuous positive airway pressure, respiratory therapy assess nares and determine need for appliance change or resting period  7/10/2025 1217 by Lyric Escobar RN  Outcome: Progressing  7/9/2025 2334 by Cierra Owens RN  Outcome: Progressing     Problem: ABCDS Injury Assessment  Goal: Absence of physical injury  7/10/2025 1217 by Lyric Escobar RN  Outcome: Progressing  7/9/2025 2334 by Cierra Owens RN  Outcome: Progressing     Problem: Nutrition Deficit:  Goal: Optimize nutritional status  7/10/2025 1217 by Lyric Escobar RN  Outcome: Progressing  7/10/2025 0921 by Roseanne Hudson RD  Outcome: Progressing  Flowsheets (Taken 7/10/2025 0921)  Nutrient intake appropriate for improving, restoring, or maintaining nutritional needs:   Assess nutritional status and recommend course of action   Monitor oral intake, labs, and treatment plans   Recommend appropriate diets, oral nutritional supplements, and vitamin/mineral supplements   Order,  calculate, and assess calorie counts as needed   Provide specific nutrition education to patient or family as appropriate   Recommend, monitor, and adjust tube feedings and TPN/PPN based on assessed needs     Problem: Neurosensory - Adult  Goal: Achieves stable or improved neurological status  Outcome: Progressing     Problem: Respiratory - Adult  Goal: Achieves optimal ventilation and oxygenation  Outcome: Progressing     Problem: Cardiovascular - Adult  Goal: Maintains optimal cardiac output and hemodynamic stability  Outcome: Progressing     Problem: Skin/Tissue Integrity - Adult  Goal: Skin integrity remains intact  Description: 1.  Monitor for areas of redness and/or skin breakdown  2.  Assess vascular access sites hourly  3.  Every 4-6 hours minimum:  Change oxygen saturation probe site  4.  Every 4-6 hours:  If on nasal continuous positive airway pressure, respiratory therapy assess nares and determine need for appliance change or resting period  7/10/2025 1217 by Lyric Escobar, RN  Outcome: Progressing  7/9/2025 2334 by Cierra Owens, RN  Outcome: Progressing     Problem: Musculoskeletal - Adult  Goal: Return ADL status to a safe level of function  Outcome: Progressing     Problem: Gastrointestinal - Adult  Goal: Maintains or returns to baseline bowel function  Outcome: Progressing  Goal: Maintains adequate nutritional intake  Outcome: Progressing     Problem: Genitourinary - Adult  Goal: Absence of urinary retention  Outcome: Progressing     Problem: Infection - Adult  Goal: Absence of infection at discharge  Outcome: Progressing     Problem: Metabolic/Fluid and Electrolytes - Adult  Goal: Electrolytes maintained within normal limits  Outcome: Progressing     Problem: Hematologic - Adult  Goal: Maintains hematologic stability  Outcome: Progressing

## 2025-07-10 NOTE — PROGRESS NOTES
SLP Therapy  Facility/Department: [address] REHAB   CLINICAL BEDSIDE SWALLOW EVALUATION    NAME: Elio Almodovar  : 1955  MRN: [id number]    ADMISSION DATE: 2025  ADMITTING DIAGNOSIS:  post  shunt  with post op concern for s/p CVA  has Hydrocephalus (HCC); Hypertensive emergency; Normal pressure hydrocephalus (HCC); Depression with suicidal ideation; and S/P  shunt on their problem list.   has a past medical history of Cancer (HCC), Diabetes mellitus (HCC), History of alcohol abuse, History of blood transfusion, History of pancreatitis, Hypertension, and NPH (normal pressure hydrocephalus) (HCC).   has a past surgical history that includes Prostatectomy; Appendectomy; Tonsillectomy; and Ventriculoperitoneal shunt (Right, 2025).  Allergies: :documentation of no known allergies  Baseline: history of work up in  including GI/Medical for consideration for PEG; SLP TX for cog/speech at that time.   ONSET DATE: 7/3/2025    Date of Eval: 7/10/2025  Evaluating Therapist: SUNITA Brennan      Chart Review:   MD History and Physical Documentation revealed:   History of Present Illness/Hospital Course:  Patient is a 69 yo M with pmh HTN, DM2, prostate cancer, depression, h/o etoh abuse, and recently diagnosed normal pressure hydrocephalus who initially presented to the [facility] on 2025 for placement of right  shunt (with Dr. Diaz). Post op course was notable for acute ischemic stroke in the right frontal lobe along the approach of VPS catheter. Neurology consulted, stroke possibly from local arterial compression. Plan is to start ASA 81 mg 14 days post-op. Course was also complicated by depression with suicidal ideation. Telehealth Psychiatry consult was completed. Recommendation is for outpatient Psych follow-up.  Patient now presents to ARU with impaired mobility, self-care, and cognition below his baseline. Currently, patient reports poor balance and unsteady gait. He has mild left  side weakness. He denies headache, vision changes, tingling/numbness, difficulty with speech or swallow.  He is motivated to start inpatient rehabilitation program.    IMAGING:   CT head 7/7/2025: IMPRESSION: 1. Stable head CT.  CT Head 7/6/2025: IMPRESSION: 1.  Unchanged size and configuration of the ventricles with unchanged positioning of right  shunt catheter.2.  Trace layering intraventricular hemorrhage in the left temporal horn, new/increased from prior.3.  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  CTA Head Neck 7/4/2025: IMPRESSION:No flow significant stenosis within the head or neck.  XR Shunt series 7/4/2024: IMPRESSION:1. The orientation is reversed, findings will be discussed with referring physician. IMPRESSION:1. The orientation is reversed, findings will be discussed with referring physician  MRI Brain: 7/3/2025: IMPRESSION:Acute zone of infarction in the right anterior frontal lobe.Cardiomegaly, with ventriculostomy catheter in place.  No chest XR this admit encounter. Last chest XR on available chart review 2024      Reason for Referral  Elio Almodovar was referred for a bedside swallow evaluation to assess the efficiency of his swallow function, identify signs and symptoms of aspiration and make recommendations regarding safe dietary consistencies, effective compensatory strategies, and safe eating environment.    Primary Complaint  MD order for Evaluation and TX Swallowing , speech and cognition  Pt denies chewing or swallowing problems. took TUMs this am but unable to identify why.  Pt complaints of balance issues;has not been able to drive for a year    Current Diet level:  Current Diet : Regular  Current Liquid Diet : Thin    Clinical Evaluation of Swallowing Impression  Dysphagia Diagnosis: Swallow function appears grossly WFL. Mastication appeared functional; bolus manipulation appeared functional; unable to r/o potential delayed swallow.   No anterior loss;   no overt clinical s/s of

## 2025-07-10 NOTE — PROGRESS NOTES
ACUTE REHAB UNIT  SPEECH/LANGUAGE PATHOLOGY      [x] Daily  [] Weekly Care Conference Note  [] Discharge    Patient:Elio Almodovar      :1955  MRN:[id number]  Rehab Dx/Hx: Normal pressure hydrocephalus (HCC) [G91.2]   CT head 2025: IMPRESSION: 1. Stable head CT.  CT Head 2025: IMPRESSION: 1.  Unchanged size and configuration of the ventricles with unchanged positioning of right  shunt catheter.2.  Trace layering intraventricular hemorrhage in the left temporal horn, new/increased from prior.3.  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  CTA Head Neck 2025: IMPRESSION:No flow significant stenosis within the head or neck.  XR Shunt series 2024: IMPRESSION:1. The orientation is reversed, findings will be discussed with referring physician. IMPRESSION:1. The orientation is reversed, findings will be discussed with referring physician  MRI Brain: 7/3/2025: IMPRESSION:Acute zone of infarction in the right anterior frontal lobe.Cardiomegaly, with ventriculostomy catheter in place.  No chest XR this admit encounter. Last chest XR on available chart review   Onset: 7/3/2025 Date of Admit: 2025  Room #: E0N-0060/3263-01    Precautions: falls, seizures, and post shunt precautions  Home situation: Lives with family; assist from family   Baseline function:  retired ; history of SLP tx in  for SLP/cognitive -linguistic ; otherwise reportedly was IND in basic DL situations and participate with family in some adv DL.Did not drive. Will need to confirmation of history by family  Baseline Diet: regular  ST Dx:Cognitive-Linguistic Deficits   Initial Speech Therapy Assessment Diagnosis:   1. Cognitive Diagnosis: Testing revealed deficits in attention; working memory and STM; and numeric reasoning. Ongoing assess of complex PS/reasoning and tx trial for temporal orientation and memory incorporating use of memory strategies. Will need to get confirmed baseline function. Pt with history

## 2025-07-10 NOTE — PROGRESS NOTES
SLP Therapy  Facility/Department: [address] IP REHAB  Initial Speech/Language/Cognitive Assessment    NAME: Elio Almodovar  : 1955   MRN: [id number]  ADMISSION DATE: 2025  ADMITTING DIAGNOSIS: s/p  shunt with post op concern for s/p CVA  has Hydrocephalus (HCC); Hypertensive emergency; Normal pressure hydrocephalus (HCC); Depression with suicidal ideation; and S/P  shunt on their problem list.   has a past medical history of Cancer (HCC), Diabetes mellitus (HCC), History of alcohol abuse, History of blood transfusion, History of pancreatitis, Hypertension, and NPH (normal pressure hydrocephalus) (HCC).   has a past surgical history that includes Prostatectomy; Appendectomy; Tonsillectomy; and Ventriculoperitoneal shunt (Right, 2025).  Allergies: :documentation of no known allergies  Baseline: history of work up in  including GI/Medical for consideration for PEG; SLP TX for cog/speech.   ONSET DATE: 7/3/2025       Date of Eval: 7/10/2025   Evaluating Therapist: SUNITA Brennan    Chart Review:   MD History and Physical Documentation revealed:    History of Present Illness/Hospital Course:  Patient is a 69 yo M with pmh HTN, DM2, prostate cancer, depression, h/o etoh abuse, and recently diagnosed normal pressure hydrocephalus who initially presented to the [facility] on 2025 for placement of right  shunt (with Dr. Diaz). Post op course was notable for acute ischemic stroke in the right frontal lobe along the approach of VPS catheter. Neurology consulted, stroke possibly from local arterial compression. Plan is to start ASA 81 mg 14 days post-op. Course was also complicated by depression with suicidal ideation. Telehealth Psychiatry consult was completed. Recommendation is for outpatient Psych follow-up.  Patient now presents to ARU with impaired mobility, self-care, and cognition below his baseline. Currently, patient reports poor balance and unsteady gait. He has mild left side  ongoing assess of complex)    Written Expression:  (TBA)  Dominant Hand:  (ambidextrous)    Pragmatics/Social Functioning  Pragmatics: Within Functional Limits    Cognition:   Orientation: Impaired  Orientation Level: Oriented to person;Disoriented to place;Disoriented to time  Attention: Impaired  Sustained Attention: Mild  Selective Attention: WFL  Alternating Attention: Moderate  Divided Attention: Moderate  Memory: Impaired  Short-term Memory:  (minimal to moderate deficits on testing)  Working Memory:  (minimal to moderate deficits)  Problem Solving: Impaired  Simple Functional Tasks: WFL  Executive Function Skills:  (impaired)  Routine Tasks:  (WFL on functional basic DL task during testing;ongoing assess)  Verbal Reasoning Skills: WFL (WFL on testing; ongoing assessment)  Sequencing:  (impaired but recall)  Numeric Reasoning: Impaired  Calculations: Mild  Money Management:  (TBA)  Time: Mild  Abstract Reasoning:  (WFL for concrete; ongoing assess for complex)  Safety/Judgment: Impaired (recall/memory deficits concern for impact)  Flexibility of Thought:  (concern for impairment)        Therapy Time:   Individual   Time In 0750   Time Out 0815   Minutes 25           Electronically signed by   Angela Lynch,MS,CCC,SLP [id number]  Speech and Language Pathologist   on 7/10/2025 at 3:58 PM

## 2025-07-10 NOTE — PROGRESS NOTES
PHYSICAL THERAPY  Progress Note   Second Session    Patient Name: Elio Almodovar  Medical Record Number: [id number]    Treatment Diagnosis: Decreased functional mobility; Decreased strength; Decreased balance; Decreased safety awareness      Chart Reviewed: Yes   Restrictions/Precautions: Fall Risk Other Position/Activity Restrictions: Elevate HOB 30 degrees; activity as tolerated   Additional Pertinent Hx: Pt is 70 y.o. male s/p RIGHT VENTRICULOPERITONEAL SHUNT PLACEMENT WITH LAPAROSCOPIC ASSIST 7/1. PMHx: prostate cancer, NPH, HTN. pancreatitis, diabetes         Subjective:  Pt agreeable to activity.  Incontinent of urine.     Objective    Pt able to stand with Min A, then slowly ambulated 150' with Min A, max cues to increase step length, correct lateral lean.  Pt able to stand to sink with Min A.  Required total assist to doff pants/brief, perform bee-care, then don new brief.  Pt able to wash hands with Min A, cues for finding soap.  Pt able to complete unsupported static stand x 1 min., Min A.  He completed heel raise x 30, march x 30, Min A for balance, RW support.       Assessment: Pt fatigued, but able to ambulate increased distance this afternoon.  Required total assist for self-care tasks.  Recommend continued therapy to gradually improve strength, balance, endurance, independence ambulating.       Safety Device - Type of devices:  [x]  All fall risk precautions in place [] Bed alarm in place  [] Call light within reach [] Chair alarm in place [] Positioning belt [x] Gait belt [] Patient at risk for falls [] Left in bed [] Left in chair [] Telesitter in use [] Sitter present [] Nurse notified []  None      Therapy Time   Individual Co-treatment   Time In 1400     Time Out 1430     Minutes 30         Electronically signed by Sudheer Patel, TORRES [id number] on 7/10/2025 at 2:33 PM

## 2025-07-10 NOTE — PROGRESS NOTES
Physical Therapy  Facility/Department: [address] REHAB  Rehabilitation Physical Therapy Initial Assessment    NAME: Elio Almodovar  : 1955 (70 y.o.)  MRN: [id number]  CODE STATUS: Full Code    Date of Service: 7/10/25      Past Medical History:   Diagnosis Date    Cancer (HCC)     PROSTATE    Diabetes mellitus (HCC)     History of alcohol abuse     History of blood transfusion     History of pancreatitis     Hypertension     NPH (normal pressure hydrocephalus) (HCC)      Past Surgical History:   Procedure Laterality Date    APPENDECTOMY      PROSTATECTOMY      TONSILLECTOMY      VENTRICULOPERITONEAL SHUNT Right 2025    RIGHT VENTRICULOPERITONEAL SHUNT PLACEMENT WITH LAPAROSCOPIC ASSIST performed by Julianna Diaz MD at [facility] OR       Chart Reviewed: Yes  Patient assessed for rehabilitation services?: Yes  Additional Pertinent Hx: Pt is 70 y.o. male s/p RIGHT VENTRICULOPERITONEAL SHUNT PLACEMENT WITH LAPAROSCOPIC ASSIST . PMHx: prostate cancer, NPH, HTN. pancreatitis, diabetes  Referring Practitioner: Elida Tavarez MD  Referral Date : 25  Diagnosis: s/p R ventriculoperitoneal shunt placement with laparoscopic assist; NPH    Restrictions:  Restrictions/Precautions: Fall Risk  Position Activity Restriction  Other Position/Activity Restrictions: Elevate HOB 30 degrees; activity as tolerated     SUBJECTIVE          Post Treatment Pain Screening       Prior Level of Function:  Social/Functional History  Lives With: Spouse  Type of Home: House  Home Layout: Multi-level, Able to Live on Main level with bedroom/bathroom  Home Access: Stairs to enter with rails  Entrance Stairs - Number of Steps: 4 MICK  Entrance Stairs - Rails: Both  Bathroom Shower/Tub: Tub/Shower unit, Walk-in shower  Bathroom Toilet: Handicap height  Bathroom Equipment: Grab bars in shower, Grab bars around toilet, Built-in shower seat  Bathroom Accessibility: Accessible  Home Equipment: Cane, Reacher, Wheelchair -  functional mobility and transfers to maximize independence. Pt demos decreased awareness of deficits, impacting pt safety. Wife is unable to provide physical assist. Rec continued therapy on ARU. Will continue to assess for DME needs.  Treatment Diagnosis: Decreased functional mobility; Decreased strength; Decreased balance; Decreased safety awareness  Therapy Prognosis: Guarded  Decision Making: Medium Complexity  History: See below  Exam: Strength; ROM; Balance; Ambulation; Coordination  Clinical Presentation: Evolving  Barriers to Learning: Cognition; Pain; Fatigue; Weakness  Treatment Initiated : 7/10/25  Discharge Recommendations: IP Rehab  PT D/C Equipment  Walker: Rolling  PT Equipment Recommendations  Equipment Needed: Yes  Mobility Devices: Walker  Walker: Rolling    CLINICAL IMPRESSION   Pt is 70 y.o. male s/p R ventriculoperitoneal shunt placement with laparoscopic assist. Pt presents pleasant, slow to process questions/commands, c/o L-sided weakness, moderate pain from his shunt.  He demonstrated mild LLE weakness, poor L-sided coordination, and very poor standing balance, consistently requiring Min A and use of RW to complete mobility tasks in room/gym.  He would benefit from skilled therapies to improve safety with functional mobility and transfers to maximize independence. Pt demos decreased awareness of deficits, impacting pt safety. Wife is unable to provide physical assist. Rec continued therapy on ARU. Will continue to assess for DME needs.    GOALS  Patient Goals   Patient Goals : To go home  Short Term Goals  Time Frame for Short Term Goals: 7-10 days  Short Term Goal 1: Bed mobility Supervision  Short Term Goal 2: Tranfers CGA  Short Term Goal 3: Ambulation 150' with CGA, RW  Short Term Goal 4: Ascend/Descend 12 steps with Min A  Short Term Goal 5: Ascend/Descend curb step with CGA  Long Term Goals  Time Frame for Long Term Goals : 14-21 days  Long Term Goal 1: Bed mobility (I)  Long Term Goal

## 2025-07-10 NOTE — PROGRESS NOTES
Occupational Therapy  Facility/Department: [address] REHAB  Rehabilitation Occupational Therapy Evaluation       Date: 7/10/25  Patient Name: Elio Almodovar       Room: E7I-8797/3263-01  MRN: [id number]  Account: [id number]   : 1955  (70 y.o.) Gender: male     Referring Practitioner: Elida Tavarez MD  Diagnosis: Normal pressure hydrocephalus  Additional Pertinent Hx: Per Dr. Tavarez H&P \"Patient is a 71 yo M with pmh HTN, DM2, prostate cancer, depression, h/o etoh abuse, and recently diagnosed normal pressure hydrocephalus who initially presented to the [facility] on 2025 for placement of right  shunt (with Dr. Diaz). Post op course was notable for acute ischemic stroke in the right frontal lobe along the approach of VPS catheter. Neurology consulted, stroke possibly from local arterial compression. Plan is to start ASA 81 mg 14 days post-op. Course was also complicated by depression with suicidal ideation. Telehealth Psychiatry consult was completed. Recommendation is for outpatient Psych follow-up.  Patient now presents to ARU with impaired mobility, self-care, and cognition below his baseline.\"    Restrictions  Restrictions/Precautions: Fall Risk  Other Position/Activity Restrictions: Elevate HOB 30 degrees; activity as tolerated    Subjective  Subjective: Pt met in room, sitting in WC, reports pain sharp 6/10 near incision sight, agreeable to OT eval/treat.          Objective     Cognition  Overall Cognitive Status: Exceptions  Arousal/Alertness: Appropriate responses to stimuli  Following Commands: Follows one step commands with repetition;Follows one step commands with increased time  Safety Judgement: Decreased awareness of need for assistance;Decreased awareness of need for safety  Problem Solving: Assistance required to identify errors made;Assistance required to generate solutions;Assistance required to implement solutions  Insights: Decreased awareness of  buttocks.  UE Dressing: Stand by assistance;Setup  UE Dressing Skilled Clinical Factors: Removed shirt and donned.  LE Dressing: Maximum assistance  LE Dressing Skilled Clinical Factors: Pt brief saturated in urine and BM smear. Max A to manage brief and pants up/down from hips in stance, seated he unthreads himself, Max A to thread clean brief and pants, Mod A to manage over hips in stance.  Putting On/Taking Off Footwear: Moderate assistance  Putting On/Taking Off Footwear Skilled Clinical Factors: Pt removed socks via figure 4, Min A to manage sneakers on/off and Mod A for Willian hose via bag technique.  Toileting: Maximum assistance  Toileting Skilled Clinical Factors: Brief saturated in urine (pt appears unaware) and BM smearing. Assist to exchange briefs and for posterior hygiene.  Functional Mobility: Minimal assistance  Functional Mobility Skilled Clinical Factors: Pt amb short distance w/ RW > recliner, slow shuffled steps and max cues to stay on task, distracted by people outside his window    Goals  Patient Goals   Patient goals : Work on balance and being able to use a walker  Short Term Goals  Time Frame for Short Term Goals: 1.5 weeks from eval on 7/10  Short Term Goal 1: Pt will complete toileting Min A  Short Term Goal 2: Pt will complete LB bathing w/ AE PRN Min A  Short Term Goal 3: Pt will complete LB dressing w/ AE PRN CGA  Short Term Goal 4: Pt will complete functional transfers and mobility w/ LRAD CGA  Short Term Goal 5: Pt will engage in BUE therex to address endurance/balance/strength needed to complete grooming tasks in stance with SBA  Additional Goals?: Yes  Long Term Goals  Time Frame for Long Term Goals : 3 weeks from eval on 7/10  Long Term Goal 1: Pt will complete toileting Mod I  Long Term Goal 2: Pt will complete LB bathing w/ AE PRN Mod I  Long Term Goal 3: Pt will complete LB dressing w/ AE PRN Mod I  Long Term Goal 4: Pt will complete functional transfers and mobility w/ LRAD Mod

## 2025-07-10 NOTE — PROGRESS NOTES
Pt admitted with normal pressure hydrocephalus s/p laparoscopic R  shunt placement on 7/1. AO x4. Able to transfer using the DIANDRA day. On heparin for DVT prophylaxis. On regular diet, tolerating well. Takes meds whole with thins. Pt is incontinent with bladder and mixed incontinence with bowel. LBM is 7/9. Advised to call if in need of assistance. Call light within reach. Monitored accordingly.

## 2025-07-10 NOTE — PLAN OF CARE
ARU PATIENT TREATMENT PLAN  [facility]   [address] [place]  [address]  [phone]    Elio G Scooby    : 1955  [id number]  MRN: [id number]   PHYSICIAN:  Elida Tavarez MD  Primary Problem    Patient Active Problem List   Diagnosis    Hydrocephalus (HCC)    Hypertensive emergency    Normal pressure hydrocephalus (HCC)    Depression with suicidal ideation    S/P  shunt       Rehabilitation Diagnosis:     Normal pressure hydrocephalus (HCC) [G91.2]       ADMIT DATE:2025    Patient Goals: To go home     Admitting Impairments: decreased balance, subtle left hemiparesis, cognition     Normal pressure hydrocephalus  -s/p right  shunt placement ( with Dr. Diaz)  -wound care per Nsgy  -PT/OT/SLP     Acute ischemic stroke  -Localization: right frontal lobe along approach of VPS catheter  -Etiology: possible local arterial compression  -Ok to start ASA 81 mg on POD #14 (7/15) per Nsgy  -continue atorvastatin  -PT/OT/SLP     Bradycardia  -EKG with sinus bradycardia  -Will decreased metoprolol and add hold parameters     Depression  -seen by Telehealth Psychiatry at [facility]. Recommend outpatient follow-up.   -continue fluoxetine, prazosin  -monitor mood     Insomnia  -continue melatonin scheduled, zolpidem prn     ADHD  -continue methylphenidate     HTN  -continue metoprolol (decreased dose and added hold parameters due to bradycardia)  -holding home lisinopril     HLD  -continue atorvastatin     DM2 with hyperglycemia  -continue lantus 20 qhs, ISS  -note, was on metformin at home     Barriers: decreased balance, subtle left hemiparesis, cognition, medical comorbidities   Participation: good     CARE PLAN     NURSING:  Elio Almodovar while on this unit will:     [] Be continent of bowel and bladder     [x] Have an adequate number of bowel movements  [x] Urinate with no urinary retention >300ml in bladder  [] Complete bladder protocol with gaitan  training, self care, home mgmt, cognitive training, energy conservation,dysphagia tx, speech/language/communication therapy, group therapy, and patient/family education. In addition, dietician/nutritionist may monitor calorie count as well as intake and collaboratively work with SLP on dietary upgrades.  Neuropsychology/Psychology may evaluate and provide necessary support.    Medical issues being managed closely and that require 24 hour availability of a physician:   [x] Swallowing Precautions  [x] Bowel/Bladder Fx  [] Weight bearing precautions   [x] Wound Care    [x] Pain Mgmt   [x] Infection Protection   [x] DVT Prophylaxis   [x] Fall Precautions  [x] Fluid/Electrolyte/Nutrition Balance   [] Voice Protection   [] Respiratory  [] Other:    Medical Prognosis: [x] Good  [] Fair    [] Guarded   Total expected IRF days 2 weeks  Anticipated discharge destination:    [] Home Independently   [x] Home Modified Independent  [] Home with supervision    []SNF     [] Other                                           Physician anticipated functional outcomes:   Marcos-supervision for mobility, ADLs, cognition    IPOC brief synthesis: Patient is a 69 yo M with pmh HTN, DM2, prostate cancer, depression, h/o etoh abuse, and recently diagnosed normal pressure hydrocephalus who initially presented to the [facility] on 7/1/2025 for placement of right  shunt (with Dr. Diaz). Post op course was notable for acute ischemic stroke in the right frontal lobe along the approach of VPS catheter. Neurology consulted, stroke possibly from local arterial compression. Plan is to start ASA 81 mg 14 days post-op. Course was also complicated by depression with suicidal ideation. Telehealth Psychiatry consult was completed. Recommendation is for outpatient Psych follow-up.  Patient now presents to ARU with impaired mobility, self-care, and cognition below his baseline. He requires comprehensive inpatient rehab program in order to return to

## 2025-07-10 NOTE — H&P
Department of Physical Medicine & Rehabilitation  History & Physical      Patient Identification:  Elio Almodovar  : 1955  Admit date: 2025   Attending provider: Elida Tavarez MD        Primary care provider: Cecil Smith MD     Chief Complaint: decreased balance    History of Present Illness/Hospital Course:  Patient is a 71 yo M with pmh HTN, DM2, prostate cancer, depression, h/o etoh abuse, and recently diagnosed normal pressure hydrocephalus who initially presented to the [facility] on 2025 for placement of right  shunt (with Dr. Diaz). Post op course was notable for acute ischemic stroke in the right frontal lobe along the approach of VPS catheter. Neurology consulted, stroke possibly from local arterial compression. Plan is to start ASA 81 mg 14 days post-op. Course was also complicated by depression with suicidal ideation. Telehealth Psychiatry consult was completed. Recommendation is for outpatient Psych follow-up.  Patient now presents to ARU with impaired mobility, self-care, and cognition below his baseline. Currently, patient reports poor balance and unsteady gait. He has mild left side weakness. He denies headache, vision changes, tingling/numbness, difficulty with speech or swallow.  He is motivated to start inpatient rehabilitation program.      Prior Level of Function:  Modified independent for pivot transfers, walking short distances, wheelchair mobilityfor longer distances,  ADLs  Requires assist for IADLs    Current Level of Function:  Ramone for mobility  Up to maxA for ADLs    Pertinent Social History:  Support: lives with spouse  Home set-up: multi level with 4 steps to enter    Past Medical History:   Diagnosis Date    Cancer (HCC)     PROSTATE    Diabetes mellitus (HCC)     History of alcohol abuse     History of blood transfusion     History of pancreatitis     Hypertension     NPH (normal pressure hydrocephalus) (HCC)        Past Surgical History:  Hearing intact, Palate elevation symmetric, Shoulder shrug intact. Tongue midline.   -Sensation intact to light touch.   -Motor examination reveals strength overall 5-/5 in BUE and BLE with subtle relative weakness on the left compared to right.   Psych: Stable mood, normal judgement, normal affect     Lab Results   Component Value Date    WBC 6.0 07/10/2025    HGB 11.6 (L) 07/10/2025    HCT 34.6 (L) 07/10/2025    MCV 90.5 07/10/2025     07/10/2025     Lab Results   Component Value Date    INR 0.94 07/01/2025    INR 1.04 02/27/2024    PROTIME 12.9 07/01/2025    PROTIME 13.7 02/27/2024     Lab Results   Component Value Date    CREATININE 1.2 07/10/2025    BUN 24 (H) 07/10/2025     07/10/2025    K 4.4 07/10/2025     07/10/2025    CO2 25 07/10/2025     No results found for: \"ALT\", \"AST\", \"GGT\", \"ALKPHOS\", \"BILITOT\"    Most recent echocardiogram revealed:    Left Ventricle: Normal left ventricular systolic function with a visually estimated EF of 60 - 65%. Left ventricle size is normal. Mildly increased wall thickness. EF BP is 63%. Normal wall motion. Normal diastolic function. No thrombus present.    Left Atrium: Left atrium is mildly dilated.    Interatrial Septum: No obvious interatrial shunt visualized with bubble study.    Image quality is technically difficult. Contrast used: Lumason.    Most recent EKG revealed:  Sinus bradycardiaOtherwise normal ECGNo previous ECGs available     Most recent imaging studies revealed:    CT head  Ventriculomegaly is again noted, unchanged. There is a small amount of layering  intraventricular hemorrhage, which is unchanged. Ventriculostomy catheter  position is similar. Hypoattenuation in the right anterior frontal lobe is  noted.  Posterior fossa is normal. No acute calvarial abnormality is detected.    CTA head/neck  No flow significant stenosis within the head or neck.     MRI brain  Acute zone of infarction in the right anterior frontal lobe.  Cardiomegaly,

## 2025-07-10 NOTE — PLAN OF CARE
Problem: Safety - Adult  Goal: Free from fall injury  7/9/2025 2334 by Cierra Owens, RN  Outcome: Progressing     Problem: Pain  Goal: Verbalizes/displays adequate comfort level or baseline comfort level  7/9/2025 2334 by Cierra Owens RN  Outcome: Progressing     Problem: Skin/Tissue Integrity  Goal: Skin integrity remains intact  Description: 1.  Monitor for areas of redness and/or skin breakdown  2.  Assess vascular access sites hourly  3.  Every 4-6 hours minimum:  Change oxygen saturation probe site  4.  Every 4-6 hours:  If on nasal continuous positive airway pressure, respiratory therapy assess nares and determine need for appliance change or resting period  7/9/2025 2334 by Cierra Owens RN  Outcome: Progressing     Problem: ABCDS Injury Assessment  Goal: Absence of physical injury  7/9/2025 2334 by Cierra Owens, RN  Outcome: Progressing

## 2025-07-10 NOTE — PROGRESS NOTES
Patient admitted to rehab with Normal pressure hydrocephalus.  A/Ox4. Transfers with Clari x1, gaitbelt. Mobility restrictions: WBAT. On Regular, 4 carb diet, tolerating well. Medications taken WWW. On heparin for DVT prophylaxis.  Skin: see LDA. Oxygen: n/a. LDA: n/a. Has been incontinent of bowel and of bladder. LBM 07.09.25. Chair/bed alarms in use and call light in reach. Will monitor for safety.     Assisted patient up to chair with clari. Dressed for therapy. Breakfast ordered. SLP at bedside when this RN left room.      Electronically signed by Lyric Escobar RN on 7/10/2025 at 7:35 AM

## 2025-07-11 LAB
GLUCOSE BLD-MCNC: 112 MG/DL (ref 70–99)
GLUCOSE BLD-MCNC: 174 MG/DL (ref 70–99)
GLUCOSE BLD-MCNC: 175 MG/DL (ref 70–99)
GLUCOSE BLD-MCNC: 234 MG/DL (ref 70–99)
PERFORMED ON: ABNORMAL

## 2025-07-11 PROCEDURE — 1280000000 HC REHAB R&B

## 2025-07-11 PROCEDURE — 97110 THERAPEUTIC EXERCISES: CPT

## 2025-07-11 PROCEDURE — 97530 THERAPEUTIC ACTIVITIES: CPT

## 2025-07-11 PROCEDURE — 6360000002 HC RX W HCPCS: Performed by: PHYSICAL MEDICINE & REHABILITATION

## 2025-07-11 PROCEDURE — 97116 GAIT TRAINING THERAPY: CPT

## 2025-07-11 PROCEDURE — 97130 THER IVNTJ EA ADDL 15 MIN: CPT

## 2025-07-11 PROCEDURE — 97535 SELF CARE MNGMENT TRAINING: CPT

## 2025-07-11 PROCEDURE — 97129 THER IVNTJ 1ST 15 MIN: CPT

## 2025-07-11 PROCEDURE — 6370000000 HC RX 637 (ALT 250 FOR IP): Performed by: PHYSICAL MEDICINE & REHABILITATION

## 2025-07-11 PROCEDURE — 94760 N-INVAS EAR/PLS OXIMETRY 1: CPT

## 2025-07-11 RX ADMIN — GABAPENTIN 100 MG: 100 CAPSULE ORAL at 20:29

## 2025-07-11 RX ADMIN — OXYCODONE HYDROCHLORIDE 10 MG: 10 TABLET ORAL at 13:31

## 2025-07-11 RX ADMIN — FLUOXETINE HYDROCHLORIDE 20 MG: 20 CAPSULE ORAL at 08:11

## 2025-07-11 RX ADMIN — HEPARIN SODIUM 5000 UNITS: 5000 INJECTION INTRAVENOUS; SUBCUTANEOUS at 20:28

## 2025-07-11 RX ADMIN — METHYLPHENIDATE HYDROCHLORIDE 5 MG: 10 TABLET ORAL at 13:31

## 2025-07-11 RX ADMIN — PRAZOSIN HYDROCHLORIDE 1 MG: 1 CAPSULE ORAL at 20:33

## 2025-07-11 RX ADMIN — INSULIN LISPRO 1 UNITS: 100 INJECTION, SOLUTION INTRAVENOUS; SUBCUTANEOUS at 20:28

## 2025-07-11 RX ADMIN — SENNOSIDES AND DOCUSATE SODIUM 1 TABLET: 8.6; 5 TABLET ORAL at 20:29

## 2025-07-11 RX ADMIN — Medication 9 MG: at 20:29

## 2025-07-11 RX ADMIN — ZOLPIDEM TARTRATE 5 MG: 5 TABLET, FILM COATED ORAL at 01:01

## 2025-07-11 RX ADMIN — GABAPENTIN 100 MG: 100 CAPSULE ORAL at 08:10

## 2025-07-11 RX ADMIN — ATORVASTATIN CALCIUM 40 MG: 40 TABLET, FILM COATED ORAL at 20:29

## 2025-07-11 RX ADMIN — ZOLPIDEM TARTRATE 5 MG: 5 TABLET, FILM COATED ORAL at 22:11

## 2025-07-11 RX ADMIN — ANTACID TABLETS 500 MG: 500 TABLET, CHEWABLE ORAL at 08:10

## 2025-07-11 RX ADMIN — PANTOPRAZOLE SODIUM 20 MG: 20 TABLET, DELAYED RELEASE ORAL at 08:10

## 2025-07-11 RX ADMIN — HEPARIN SODIUM 5000 UNITS: 5000 INJECTION INTRAVENOUS; SUBCUTANEOUS at 05:29

## 2025-07-11 RX ADMIN — PANTOPRAZOLE SODIUM 20 MG: 20 TABLET, DELAYED RELEASE ORAL at 20:29

## 2025-07-11 RX ADMIN — METOPROLOL SUCCINATE 25 MG: 25 TABLET, EXTENDED RELEASE ORAL at 08:11

## 2025-07-11 RX ADMIN — OXYCODONE HYDROCHLORIDE 10 MG: 10 TABLET ORAL at 08:12

## 2025-07-11 RX ADMIN — GABAPENTIN 100 MG: 100 CAPSULE ORAL at 13:31

## 2025-07-11 RX ADMIN — HEPARIN SODIUM 5000 UNITS: 5000 INJECTION INTRAVENOUS; SUBCUTANEOUS at 13:31

## 2025-07-11 RX ADMIN — METHYLPHENIDATE HYDROCHLORIDE 5 MG: 10 TABLET ORAL at 08:11

## 2025-07-11 ASSESSMENT — PAIN DESCRIPTION - LOCATION
LOCATION: HEAD
LOCATION: HEAD

## 2025-07-11 ASSESSMENT — PAIN - FUNCTIONAL ASSESSMENT
PAIN_FUNCTIONAL_ASSESSMENT: ACTIVITIES ARE NOT PREVENTED
PAIN_FUNCTIONAL_ASSESSMENT: ACTIVITIES ARE NOT PREVENTED

## 2025-07-11 ASSESSMENT — PAIN DESCRIPTION - ORIENTATION
ORIENTATION: RIGHT
ORIENTATION: RIGHT

## 2025-07-11 ASSESSMENT — PAIN DESCRIPTION - DESCRIPTORS
DESCRIPTORS: SHARP
DESCRIPTORS: DISCOMFORT

## 2025-07-11 ASSESSMENT — PAIN SCALES - GENERAL
PAINLEVEL_OUTOF10: 10
PAINLEVEL_OUTOF10: 7
PAINLEVEL_OUTOF10: 7
PAINLEVEL_OUTOF10: 5
PAINLEVEL_OUTOF10: 4

## 2025-07-11 ASSESSMENT — 9 HOLE PEG TEST
TESTTIME_SECONDS: 53.75
TESTTIME_SECONDS: 41.77

## 2025-07-11 NOTE — PROGRESS NOTES
Physical Therapy  Facility/Department: [address] [facility]HAB  Rehabilitation Physical Therapy Treatment Note    NAME: Elio Almodovar  : 1955 (70 y.o.)  MRN: [id number]  CODE STATUS: Full Code    Date of Service: 25       Restrictions:  Restrictions/Precautions: Fall Risk  Position Activity Restriction  Other Position/Activity Restrictions: Elevate HOB 30 degrees; activity as tolerated     SUBJECTIVE  Subjective: Pt agreeable to activity.       OBJECTIVE  Cognition  Overall Cognitive Status: Exceptions  Arousal/Alertness: Appropriate responses to stimuli  Following Commands: Follows one step commands with repetition;Follows one step commands with increased time  Safety Judgement: Decreased awareness of need for assistance;Decreased awareness of need for safety  Problem Solving: Assistance required to identify errors made;Assistance required to generate solutions;Assistance required to implement solutions  Insights: Decreased awareness of deficits  Initiation: Requires cues for some  Sequencing: Requires cues for some  Cognition Comment: Delayed processing and distractible  Orientation  Overall Orientation Status: Within Functional Limits  Orientation Level: Oriented to person;Oriented to time;Oriented to place    Functional Mobility  Transfers  Surface: From chair with arms  Additional Factors: Set-up;Verbal cues  Sit to Stand  Assistance Level: Minimal assistance  Skilled Clinical Factors: Min A to correct posterior lean  Stand to Sit  Assistance Level: Minimal assistance  Skilled Clinical Factors: Cues to completely turn before sitting; cues for hand placement; Min A to correct posterior lean      Environmental Mobility  Ambulation  Surface: Level surface  Device: Rolling walker  Activity Comments: Slow speed, progressive weakness/freezing of LLE noted, max cues to re-initiate ambulation.  Assistance Level: Contact guard assist  Gait Deviations: Decreased step length left;Decreased weight shift left;Slow  moses;Narrow base of support  Skilled Clinical Factors: Drift to R; cues to correct             PT Exercises  Exercise Treatment: Sit < > stand x 10, max cues and Min A initially to increase forward lean duting stand and during sit.  Pt able to progress to SBA with continued cueing.  Standing toe-tap on 6\" cone, RLE/LLE 2 x 10, cues for technique, CGA.  Static stand unsupported x 2 min., cues to increase shift L, avoid trunk rotation L, Min A to correct several LOB.      ASSESSMENT/PROGRESS TOWARDS GOALS       Assessment  Assessment: Pt able to ambulate 150' with RW, CGA, max cues to avoid drift to R, and improve LE sequencing.  Pt experiencing regular, progressive LLE freezing during gait. He denies having this difficulty PTA.  He would benefit from continued therapy to improve LE coordination, strength, balance, endurance, independence ambualting.  Activity Tolerance: Patient limited by endurance;Treatment limited secondary to decreased cognition  Discharge Recommendations: IP Rehab    Goals  Patient Goals   Patient Goals : To go home  Short Term Goals  Time Frame for Short Term Goals: 7-10 days  Short Term Goal 1: Bed mobility Supervision  Short Term Goal 2: Tranfers CGA  Short Term Goal 3: Ambulation 150' with CGA, RW  Short Term Goal 4: Ascend/Descend 12 steps with Min A  Short Term Goal 5: Ascend/Descend curb step with CGA  Long Term Goals  Time Frame for Long Term Goals : 14-21 days  Long Term Goal 1: Bed mobility (I)  Long Term Goal 2: Transfers (I)  Long Term Goal 3: Ambulation 150' with RW (I)  Long Term Goal 4: Ascend/Descend 12 steps (I)  Long Term Goal 5: Ascend/Descend curb step (I)    PLAN OF CARE/SAFETY  Physical Therapy Plan  General Plan: 5-7 times per week  Days Per Week: 5 Days  Hours Per Day: 2 hours  Therapy Duration: 3 Weeks  Current Treatment Recommendations: Strengthening;Functional mobility training;Gait training;Safety education & training;Equipment evaluation, education, &

## 2025-07-11 NOTE — PROGRESS NOTES
PHYSICAL THERAPY  Progress Note   Second Session    Patient Name: Elio Almodovar  Medical Record Number: [id number]    Treatment Diagnosis: Decreased functional mobility; Decreased strength; Decreased balance; Decreased safety awareness      Chart Reviewed: Yes   Restrictions/Precautions: Fall Risk Other Position/Activity Restrictions: Elevate HOB 30 degrees; activity as tolerated   Additional Pertinent Hx: Pt is 70 y.o. male s/p RIGHT VENTRICULOPERITONEAL SHUNT PLACEMENT WITH LAPAROSCOPIC ASSIST 7/1. PMHx: prostate cancer, NPH, HTN. pancreatitis, diabetes         Subjective:  Pt agreeable to activity.  Denies pain.     Objective    Pt stood with CGA, multiple attempts, max cues to increase anterior weight shift.  Pt ambulated 100' with RW, SBA, slow speed, verbal cues for LLE step length.  He became more fatigued, requiring Min A to finish an additional 50' of ambulation back to wc.  LLE step length decreased significantly, and his drift to R increased.  He took seated rest.  Pt practiced standing with single-hand support, stepping forward/backward over .5 inch obstacle RLE/LLE x 10, Mod-Max A to maintain balance d/t lateral lean.  Pt completed A/P weight shift x 10, Lateral weight shift x 10, RW support, Min-CGA.  Static stand 2 x 30 s., no UE support, Min A to correct intermittent posterior LOB.  Alternating Shoulder Flex x 10 with RW support, x 10 without walker support, Min A to correct for consistent posterior LOB.  Pt took seated rest.  Seated (B) Forward reach to target 3 x 10, cues to maximize anterior weight shift.  Sit < > stand to RW x 10, cues to increase anterior weight shift, CGA-SBA.    Assessment: Pt pleasant and agreeable to session, still limited in standing tolerance d/t LE weakness. LLE quickly fatigues with extended ambulation, and pt requires physical assist and max verbal cueing to regain proper gait mechanics.  He would benefit from continued therapy to further improve LE strength,  coordination, balance, endurance, and independence ambulating.       Safety Device - Type of devices:  [x]  All fall risk precautions in place [] Bed alarm in place  [] Call light within reach [] Chair alarm in place [] Positioning belt [x] Gait belt [] Patient at risk for falls [] Left in bed [] Left in chair [] Telesitter in use [] Sitter present [] Nurse notified []  None      Therapy Time   Individual Co-treatment   Time In 1400     Time Out 1445     Minutes 45         Electronically signed by Sudheer Patel, PT [id number] on 7/11/2025 at 2:46 PM

## 2025-07-11 NOTE — PROGRESS NOTES
Pt admitted with normal pressure hydrocephalus s/p laparoscopic R  shunt placement on 7/1. AO x4. Able to transfer using the DIANDRA day. On heparin for DVT prophylaxis. On regular diet 4CC, tolerating well. Takes meds whole with thins. Pt is incontinent with bladder. LBM is 7/9. Advised to call if in need of assistance. Call light within reach. Monitored accordingly.

## 2025-07-11 NOTE — PLAN OF CARE
Problem: Safety - Adult  Goal: Free from fall injury  7/10/2025 2344 by Cierra Owens RN  Outcome: Progressing     Problem: Pain  Goal: Verbalizes/displays adequate comfort level or baseline comfort level  7/10/2025 2344 by Cierra Owens RN  Outcome: Progressing     Problem: Skin/Tissue Integrity  Goal: Skin integrity remains intact  Description: 1.  Monitor for areas of redness and/or skin breakdown  2.  Assess vascular access sites hourly  3.  Every 4-6 hours minimum:  Change oxygen saturation probe site  4.  Every 4-6 hours:  If on nasal continuous positive airway pressure, respiratory therapy assess nares and determine need for appliance change or resting period  7/10/2025 2344 by Cierra Owens RN  Outcome: Progressing     Problem: ABCDS Injury Assessment  Goal: Absence of physical injury  7/10/2025 2344 by Cierra Owens, RN  Outcome: Progressing     Problem: Nutrition Deficit:  Goal: Optimize nutritional status  7/10/2025 2344 by Cierra Owens RN  Outcome: Progressing     Problem: Neurosensory - Adult  Goal: Achieves stable or improved neurological status  7/10/2025 2344 by Cierra Owens RN  Outcome: Progressing     Problem: Respiratory - Adult  Goal: Achieves optimal ventilation and oxygenation  7/10/2025 2344 by Cierra Owens RN  Outcome: Progressing     Problem: Skin/Tissue Integrity - Adult  Goal: Skin integrity remains intact  Description: 1.  Monitor for areas of redness and/or skin breakdown  2.  Assess vascular access sites hourly  3.  Every 4-6 hours minimum:  Change oxygen saturation probe site  4.  Every 4-6 hours:  If on nasal continuous positive airway pressure, respiratory therapy assess nares and determine need for appliance change or resting period  7/10/2025 2344 by Cierra Owens, RN  Outcome: Progressing

## 2025-07-11 NOTE — PLAN OF CARE
Problem: Chronic Conditions and Co-morbidities  Goal: Patient's chronic conditions and co-morbidity symptoms are monitored and maintained or improved  Outcome: Progressing  Flowsheets (Taken 7/11/2025 0808)  Care Plan - Patient's Chronic Conditions and Co-Morbidity Symptoms are Monitored and Maintained or Improved: Monitor and assess patient's chronic conditions and comorbid symptoms for stability, deterioration, or improvement     Problem: Discharge Planning  Goal: Discharge to home or other facility with appropriate resources  Outcome: Progressing  Flowsheets (Taken 7/11/2025 0808)  Discharge to home or other facility with appropriate resources: Identify barriers to discharge with patient and caregiver     Problem: Safety - Adult  Goal: Free from fall injury  7/11/2025 1111 by Elida Garcia RN  Outcome: Progressing  Flowsheets (Taken 7/11/2025 0808)  Free From Fall Injury: Instruct family/caregiver on patient safety     Problem: Pain  Goal: Verbalizes/displays adequate comfort level or baseline comfort level  7/11/2025 1111 by Elida Garcia RN  Outcome: Progressing  Flowsheets (Taken 7/11/2025 0808)  Verbalizes/displays adequate comfort level or baseline comfort level: Encourage patient to monitor pain and request assistance     Problem: Skin/Tissue Integrity  Goal: Skin integrity remains intact  Description: 1.  Monitor for areas of redness and/or skin breakdown  2.  Assess vascular access sites hourly  3.  Every 4-6 hours minimum:  Change oxygen saturation probe site  4.  Every 4-6 hours:  If on nasal continuous positive airway pressure, respiratory therapy assess nares and determine need for appliance change or resting period  7/11/2025 1111 by Elida Garcia RN  Outcome: Progressing  Flowsheets (Taken 7/11/2025 0808)  Skin Integrity Remains Intact: Monitor for areas of redness and/or skin breakdown     Problem: ABCDS Injury Assessment  Goal: Absence of physical injury  7/11/2025 1111 by

## 2025-07-11 NOTE — PROGRESS NOTES
Department of Physical Medicine & Rehabilitation  Progress Note    Patient Identification:  Elio Almodovar  [id number]  : 1955  Admit date: 2025    Chief Complaint: Normal pressure hydrocephalus (HCC)    Subjective:   No acute events overnight.   Patient seen this afternoon sitting up in gym. Denies headaches. He feels balance continues to be his biggest barrier. PT working on this with him.   Labs reviewed.     ROS: No f/c, n/v, cp     Objective:  Patient Vitals for the past 24 hrs:   BP Temp Temp src Pulse Resp SpO2 Weight   25 0808 134/64 97.9 °F (36.6 °C) Oral 55 18 95 % --   25 0533 -- -- -- -- -- -- 90.1 kg (198 lb 10.2 oz)   07/10/25 2145 -- -- -- -- 16 -- --   07/10/25 2115 -- -- -- -- 16 -- --   07/10/25 2009 (!) 148/65 98.4 °F (36.9 °C) Oral 53 16 97 % --   07/10/25 1522 -- -- -- -- 16 -- --   07/10/25 1452 -- -- -- -- 16 -- --     Const: Alert. No distress, pleasant.   HEENT: Right side VPS sites well approximated with staples. Normal sclera/conjunctiva. MMM.   CV: Regular rate and rhythm.   Resp: No respiratory distress. Lungs CTAB.   Abd: Soft, nontender, nondistended, NABS+   Ext: No edema.   Neuro: Alert, oriented, some impaired recall   Psych: Cooperative, appropriate mood and affect    Laboratory data: Available via EMR.   Last 24 hour lab  Recent Results (from the past 24 hours)   EKG 12 Lead    Collection Time: 07/10/25 10:41 AM   Result Value Ref Range    Ventricular Rate 47 BPM    Atrial Rate 47 BPM    P-R Interval 178 ms    QRS Duration 82 ms    Q-T Interval 450 ms    QTc Calculation (Bazett) 398 ms    P Axis 43 degrees    R Axis 32 degrees    T Axis 47 degrees    Diagnosis       Sinus bradycardiaConfirmed by STEPHON HARVEY ([id number]) on 7/10/2025 2:40:32 PM   POCT Glucose    Collection Time: 07/10/25 11:08 AM   Result Value Ref Range    POC Glucose 151 (H) 70 - 99 mg/dl    Performed on ACCU-CHEK    POCT Glucose    Collection Time: 07/10/25  4:20 PM   Result Value Ref

## 2025-07-11 NOTE — PROGRESS NOTES
OCCUPATIONAL THERAPY  Progress Note   Second Session    Patient Name: Elio Almodovar  Medical Record Number: [id number]    Treatment Diagnosis: Decreased ADL/IADL function, decreased functional mobility; Decreased strength; Decreased balance; Decreased safety awareness    General  Chart Reviewed: Yes, Orders, Progress Notes, History and Physical  Patient assessed for rehabilitation services?: Yes  Additional Pertinent Hx: Per Dr. Tavarez H&P \"Patient is a 71 yo M with pmh HTN, DM2, prostate cancer, depression, h/o etoh abuse, and recently diagnosed normal pressure hydrocephalus who initially presented to the [facility] on 7/[facility] for placement of right  shunt (with Dr. Diaz). Post op course was notable for acute ischemic stroke in the right frontal lobe along the approach of VPS catheter. Neurology consulted, stroke possibly from local arterial compression. Plan is to start ASA 81 mg 14 days post-op. Course was also complicated by depression with suicidal ideation. Telehealth Psychiatry consult was completed. Recommendation is for outpatient Psych follow-up.  Patient now presents to ARU with impaired mobility, self-care, and cognition below his baseline.\"  Family / Caregiver Present: No  Referring Practitioner: Elida Tavarez MD  Diagnosis: Normal pressure hydrocephalus     Restrictions/Precautions  Restrictions/Precautions: Fall Risk        Position Activity Restriction  Other Position/Activity Restrictions: Elevate HOB 30 degrees; activity as tolerated         Objective:  Pt met in dept agreeable to tx. Pt denied pain. Seen after PT. Pt c/o being tired when asked. He reported to OT he did all the cooking at home before.     OT discussed walker safety principles to pt & demo to pt. OT placed walker basket on RW & dicussed not safe to walk w/ RW & carry items in hands. Pt used basket for carrying to make piece of buttered toast. OT showed him location of items (butter/bread & knife)at start, but

## 2025-07-11 NOTE — PROGRESS NOTES
Occupational Therapy  Facility/Department: [address] RE[facility]AB  Rehabilitation Occupational Therapy Daily AM Treatment Note    Date: 25  Patient Name: Elio Almodovar       Room: F6F-2658/3263-01  MRN: [id number]  Account: [id number]   : 1955  (70 y.o.) Gender: male           Additional Pertinent Hx: Per Dr. Tavarez H&P \"Patient is a 71 yo M with pmh HTN, DM2, prostate cancer, depression, h/o etoh abuse, and recently diagnosed normal pressure hydrocephalus who initially presented to the [facility] on 2025 for placement of right  shunt (with Dr. Diaz). Post op course was notable for acute ischemic stroke in the right frontal lobe along the approach of VPS catheter. Neurology consulted, stroke possibly from local arterial compression. Plan is to start ASA 81 mg 14 days post-op. Course was also complicated by depression with suicidal ideation. Telehealth Psychiatry consult was completed. Recommendation is for outpatient Psych follow-up.  Patient now presents to ARU with impaired mobility, self-care, and cognition below his baseline.\"        Past Medical History:  has a past medical history of Cancer (HCC), Diabetes mellitus (HCC), History of alcohol abuse, History of blood transfusion, History of pancreatitis, Hypertension, and NPH (normal pressure hydrocephalus) (HCC).  Past Surgical History:   has a past surgical history that includes Prostatectomy; Appendectomy; Tonsillectomy; and Ventriculoperitoneal shunt (Right, 2025).    Restrictions  Restrictions/Precautions: Fall Risk  Other Position/Activity Restrictions: Elevate HOB 30 degrees; activity as tolerated    Subjective  Subjective: Pt met in room, on Stedy with PCA, reports throbbing pain at incision 8/10. Agreeable to OT treat, taken to dept for session.  Restrictions/Precautions: Fall Risk             Objective     Cognition  Overall Cognitive Status: Exceptions  Arousal/Alertness: Appropriate responses to stimuli  Following

## 2025-07-11 NOTE — CARE COORDINATION
Chart reviewed.  Met with patient to introduce  role, initiate discussion regarding DC planning and to inform of weekly Team Conferences to review his progress on Tuesdays.                                 SOCIAL WORK ASSESSMENT      GOAL:   Pt's goal is to return home with wife and 130# dog      HOME SITUATION:Pt, wife and dog live in a house with 2 steps with bilateral rails to enter one floor home.  It is a first floor set up.   He reports he was in a nursing facility for a year and has not been home.   He wants to return home to wife who is disabled, not working but totally independent.  She does the cleaning, shopping, laundry, pet care and assists with medicine.  They have a walk in shower with bars, Built in seat and hh mount.   He reports he was independent prior to admit.  He is a retired /Paramedic and retired at age 57.   He and wife both drive.  He does the lawn with a riding mower.      Pcp:   Dr Smith    over a year ago was his last visit    Pharmacy:   [facility]        PRIOR LEVEL OF FUNCTIONING:       PERSONAL CARE:  Indpt                                                                         DRIVES: yes, he and wife drive                                                                     FINANCES:  wife                                                                     MEALS:   he likes to cook; his specialty is soup.                                                                                                GROCERY SHOPS:dep      DME CURRENTLY AT HOME:  No DME except a walk in shower with bars, Mount and built in seat      CURRENT HOME CARE/SERVICES:  none.  Informed him he may need to continue with service post his discharge.   He is agreeable to this.       PREFERRED HOME CARE:  To be determined.       TEAM CONFERENCE DAY:  Tuesdays.  Informed him of weekly Team Conferences where Team will review his barriers, progress, DME recs and DC date. This

## 2025-07-12 LAB
GLUCOSE BLD-MCNC: 117 MG/DL (ref 70–99)
GLUCOSE BLD-MCNC: 181 MG/DL (ref 70–99)
GLUCOSE BLD-MCNC: 237 MG/DL (ref 70–99)
GLUCOSE BLD-MCNC: 288 MG/DL (ref 70–99)
PERFORMED ON: ABNORMAL

## 2025-07-12 PROCEDURE — 6370000000 HC RX 637 (ALT 250 FOR IP): Performed by: PHYSICAL MEDICINE & REHABILITATION

## 2025-07-12 PROCEDURE — 97530 THERAPEUTIC ACTIVITIES: CPT | Performed by: PHYSICAL THERAPIST

## 2025-07-12 PROCEDURE — 97110 THERAPEUTIC EXERCISES: CPT | Performed by: PHYSICAL THERAPIST

## 2025-07-12 PROCEDURE — 1280000000 HC REHAB R&B

## 2025-07-12 PROCEDURE — 6360000002 HC RX W HCPCS: Performed by: PHYSICAL MEDICINE & REHABILITATION

## 2025-07-12 PROCEDURE — 97116 GAIT TRAINING THERAPY: CPT | Performed by: PHYSICAL THERAPIST

## 2025-07-12 PROCEDURE — 97129 THER IVNTJ 1ST 15 MIN: CPT

## 2025-07-12 PROCEDURE — 97110 THERAPEUTIC EXERCISES: CPT

## 2025-07-12 PROCEDURE — 97130 THER IVNTJ EA ADDL 15 MIN: CPT

## 2025-07-12 PROCEDURE — 94760 N-INVAS EAR/PLS OXIMETRY 1: CPT

## 2025-07-12 PROCEDURE — 97535 SELF CARE MNGMENT TRAINING: CPT

## 2025-07-12 RX ADMIN — INSULIN LISPRO 1 UNITS: 100 INJECTION, SOLUTION INTRAVENOUS; SUBCUTANEOUS at 11:32

## 2025-07-12 RX ADMIN — FLUOXETINE HYDROCHLORIDE 20 MG: 20 CAPSULE ORAL at 09:47

## 2025-07-12 RX ADMIN — METFORMIN HYDROCHLORIDE 500 MG: 500 TABLET ORAL at 17:39

## 2025-07-12 RX ADMIN — OXYCODONE HYDROCHLORIDE 10 MG: 10 TABLET ORAL at 17:39

## 2025-07-12 RX ADMIN — GABAPENTIN 100 MG: 100 CAPSULE ORAL at 09:47

## 2025-07-12 RX ADMIN — PANTOPRAZOLE SODIUM 20 MG: 20 TABLET, DELAYED RELEASE ORAL at 09:46

## 2025-07-12 RX ADMIN — PANTOPRAZOLE SODIUM 20 MG: 20 TABLET, DELAYED RELEASE ORAL at 20:31

## 2025-07-12 RX ADMIN — ATORVASTATIN CALCIUM 40 MG: 40 TABLET, FILM COATED ORAL at 20:31

## 2025-07-12 RX ADMIN — Medication 9 MG: at 20:31

## 2025-07-12 RX ADMIN — GABAPENTIN 100 MG: 100 CAPSULE ORAL at 14:02

## 2025-07-12 RX ADMIN — ZOLPIDEM TARTRATE 5 MG: 5 TABLET, FILM COATED ORAL at 20:32

## 2025-07-12 RX ADMIN — HEPARIN SODIUM 5000 UNITS: 5000 INJECTION INTRAVENOUS; SUBCUTANEOUS at 14:02

## 2025-07-12 RX ADMIN — METFORMIN HYDROCHLORIDE 500 MG: 500 TABLET ORAL at 09:47

## 2025-07-12 RX ADMIN — ANTACID TABLETS 500 MG: 500 TABLET, CHEWABLE ORAL at 09:47

## 2025-07-12 RX ADMIN — INSULIN LISPRO 2 UNITS: 100 INJECTION, SOLUTION INTRAVENOUS; SUBCUTANEOUS at 20:31

## 2025-07-12 RX ADMIN — SENNOSIDES AND DOCUSATE SODIUM 1 TABLET: 8.6; 5 TABLET ORAL at 20:32

## 2025-07-12 RX ADMIN — METOPROLOL SUCCINATE 25 MG: 25 TABLET, EXTENDED RELEASE ORAL at 09:47

## 2025-07-12 RX ADMIN — HEPARIN SODIUM 5000 UNITS: 5000 INJECTION INTRAVENOUS; SUBCUTANEOUS at 21:49

## 2025-07-12 RX ADMIN — OXYCODONE HYDROCHLORIDE 10 MG: 10 TABLET ORAL at 06:23

## 2025-07-12 RX ADMIN — HEPARIN SODIUM 5000 UNITS: 5000 INJECTION INTRAVENOUS; SUBCUTANEOUS at 06:23

## 2025-07-12 RX ADMIN — INSULIN LISPRO 1 UNITS: 100 INJECTION, SOLUTION INTRAVENOUS; SUBCUTANEOUS at 09:46

## 2025-07-12 RX ADMIN — OXYCODONE HYDROCHLORIDE 10 MG: 10 TABLET ORAL at 12:12

## 2025-07-12 RX ADMIN — OXYCODONE HYDROCHLORIDE 10 MG: 10 TABLET ORAL at 21:49

## 2025-07-12 RX ADMIN — PRAZOSIN HYDROCHLORIDE 1 MG: 1 CAPSULE ORAL at 21:50

## 2025-07-12 RX ADMIN — METHYLPHENIDATE HYDROCHLORIDE 5 MG: 10 TABLET ORAL at 09:47

## 2025-07-12 RX ADMIN — METHYLPHENIDATE HYDROCHLORIDE 5 MG: 10 TABLET ORAL at 14:02

## 2025-07-12 RX ADMIN — GABAPENTIN 100 MG: 100 CAPSULE ORAL at 20:32

## 2025-07-12 ASSESSMENT — PAIN SCALES - GENERAL
PAINLEVEL_OUTOF10: 8
PAINLEVEL_OUTOF10: 7
PAINLEVEL_OUTOF10: 7
PAINLEVEL_OUTOF10: 8
PAINLEVEL_OUTOF10: 5
PAINLEVEL_OUTOF10: 10
PAINLEVEL_OUTOF10: 7

## 2025-07-12 ASSESSMENT — PAIN DESCRIPTION - DESCRIPTORS
DESCRIPTORS: ACHING
DESCRIPTORS: SHARP

## 2025-07-12 ASSESSMENT — PAIN DESCRIPTION - LOCATION
LOCATION: HEAD
LOCATION: BACK

## 2025-07-12 ASSESSMENT — PAIN - FUNCTIONAL ASSESSMENT
PAIN_FUNCTIONAL_ASSESSMENT: ACTIVITIES ARE NOT PREVENTED
PAIN_FUNCTIONAL_ASSESSMENT: PREVENTS OR INTERFERES SOME ACTIVE ACTIVITIES AND ADLS
PAIN_FUNCTIONAL_ASSESSMENT: PREVENTS OR INTERFERES SOME ACTIVE ACTIVITIES AND ADLS

## 2025-07-12 ASSESSMENT — PAIN DESCRIPTION - PAIN TYPE: TYPE: SURGICAL PAIN

## 2025-07-12 ASSESSMENT — PAIN DESCRIPTION - ORIENTATION
ORIENTATION: RIGHT
ORIENTATION: LOWER
ORIENTATION: LOWER

## 2025-07-12 ASSESSMENT — PAIN DESCRIPTION - FREQUENCY: FREQUENCY: INTERMITTENT

## 2025-07-12 ASSESSMENT — PAIN DESCRIPTION - ONSET: ONSET: ON-GOING

## 2025-07-12 NOTE — PROGRESS NOTES
Patient admitted to rehab with normal pressure hydrocephalus.  A/Ox4. Transfers with stedyx1. Mobility restrictions: WBAT. On regular 4ccc diet, tolerating well. Medications taken whole with thins. On heparin for DVT prophylaxis.  Skin: surgical incision to head, abd, redness to buttocksand heels. Oxygen: RA. LDA: none. Has been continent of bowel and continent of bladder. LBM 7/9. Chair/bed alarms in use and call light in reach. Will monitor for safety.

## 2025-07-12 NOTE — PROGRESS NOTES
ACUTE REHAB UNIT  SPEECH/LANGUAGE PATHOLOGY      [x] Daily  [] Weekly Care Conference Note  [] Discharge    Patient:Elio Almodovar      :1955  MRN:[id number]  Rehab Dx/Hx: Normal pressure hydrocephalus (HCC) [G91.2]   CT head 2025: IMPRESSION: 1. Stable head CT.  CT Head 2025: IMPRESSION: 1.  Unchanged size and configuration of the ventricles with unchanged positioning of right  shunt catheter.2.  Trace layering intraventricular hemorrhage in the left temporal horn, new/increased from prior.3.  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  CTA Head Neck 2025: IMPRESSION:No flow significant stenosis within the head or neck.  XR Shunt series 2024: IMPRESSION:1. The orientation is reversed, findings will be discussed with referring physician. IMPRESSION:1. The orientation is reversed, findings will be discussed with referring physician  MRI Brain: 7/3/2025: IMPRESSION:Acute zone of infarction in the right anterior frontal lobe.Cardiomegaly, with ventriculostomy catheter in place.  No chest XR this admit encounter. Last chest XR on available chart review   Onset: 7/3/2025 Date of Admit: 2025  Room #: E4X-6836/3263-01    Precautions: falls, seizures, and post shunt precautions  Home situation: Lives with family; assist from family   Baseline function:  retired ; history of SLP tx in  for SLP/cognitive -linguistic ; otherwise reportedly was IND in basic DL situations and participate with family in some adv DL.Did not drive. Will need to confirmation of history by family  Baseline Diet: regular  ST Dx:Cognitive-Linguistic Deficits   Initial Speech Therapy Assessment Diagnosis:   1. Cognitive Diagnosis: Testing revealed deficits in attention; working memory and STM; and numeric reasoning. Ongoing assess of complex PS/reasoning and tx trial for temporal orientation and memory incorporating use of memory strategies. Will need to get confirmed baseline function. Pt with history  for self and help with home situations with help of wife and daughter   Therapy working toward pt goals with use of strategies PRN in cognitive-linguistic remediation   Goal 1: Pt will demonstrate improved recall of daily events, learned safety restrictions/strategies and daily events with use of compensatory strategies and <moderate assist   Orientation:   Without cues: oriented to self, month /year, current hospital, time (used clock IND), president  With cues: oriented to date/ JOYA, season, LOS, reason for hospitalization    Short term recall:  Recall of room number after training in memory strategy: IND after 20 min delay   Recall of 3 therapies: 2/3 IND; 3/3 by target/activity (ie 'mental' for speech)    Recall of AM OT session activities: mild prompts; and level of assist needed to complete: accurate repsonse to open ended questions  Recall of safety sequencing/transfers: mild prompts    Goal 2: Pt will demonstrate functional VPS/PS /reasoning for graded problems situations for basic DL and mild complex assisted daily living requiring executive function with set up; use of compensatory strategies and <moderate assist   Verbal sequencing for transfers and ambulation:  Verbal prompts for progression of steps; near IND for safety sequencing of steps (except for hand placement)   Other areas targeted:            Education:   Ongoing patient ed:  Patient Response: Pt verbalized understanding and agreement , Pt requires ongoing learning, Concern for reduced/recall insight  Family education: Family not present/unavailable  Staff education: last team oriented discussion 7/10/2025    Safety Devices:  Call light within reach , Chair alarm activated     Progress Assessment: 7/11/2025: continue tx poc   Plan: Continue as per plan of care while on ARU and if recommended at next level of care.   Continued Tx Upon Discharge: ?     TBD based on progress while on ARU    Estimated discharge date: TBD   Prognosis / Barriers

## 2025-07-12 NOTE — PLAN OF CARE
Problem: Chronic Conditions and Co-morbidities  Goal: Patient's chronic conditions and co-morbidity symptoms are monitored and maintained or improved  7/12/2025 1255 by Jose Sun RN  Outcome: Progressing  Flowsheets (Taken 7/12/2025 1255)  Care Plan - Patient's Chronic Conditions and Co-Morbidity Symptoms are Monitored and Maintained or Improved: Monitor and assess patient's chronic conditions and comorbid symptoms for stability, deterioration, or improvement  7/12/2025 0425 by Aida Fisher RN  Outcome: Progressing  Flowsheets (Taken 7/11/2025 1945)  Care Plan - Patient's Chronic Conditions and Co-Morbidity Symptoms are Monitored and Maintained or Improved: Monitor and assess patient's chronic conditions and comorbid symptoms for stability, deterioration, or improvement     Problem: Discharge Planning  Goal: Discharge to home or other facility with appropriate resources  7/12/2025 0425 by Aida Fisher RN  Outcome: Progressing  Flowsheets (Taken 7/11/2025 1945)  Discharge to home or other facility with appropriate resources: Identify barriers to discharge with patient and caregiver     Problem: Safety - Adult  Goal: Free from fall injury  7/12/2025 1255 by Jose Sun RN  Outcome: Progressing  Flowsheets (Taken 7/12/2025 1255)  Free From Fall Injury: Instruct family/caregiver on patient safety  7/12/2025 0425 by Aida Fisher RN  Outcome: Progressing     Problem: Pain  Goal: Verbalizes/displays adequate comfort level or baseline comfort level  7/12/2025 1255 by Jose Sun RN  Outcome: Progressing  Flowsheets (Taken 7/12/2025 1255)  Verbalizes/displays adequate comfort level or baseline comfort level:   Administer analgesics based on type and severity of pain and evaluate response   Assess pain using appropriate pain scale   Implement non-pharmacological measures as appropriate and evaluate response   Encourage patient to monitor pain and request assistance  7/12/2025 0425 by Phillip

## 2025-07-12 NOTE — PLAN OF CARE
Problem: Chronic Conditions and Co-morbidities  Goal: Patient's chronic conditions and co-morbidity symptoms are monitored and maintained or improved  Outcome: Progressing  Flowsheets (Taken 7/11/2025 1945)  Care Plan - Patient's Chronic Conditions and Co-Morbidity Symptoms are Monitored and Maintained or Improved: Monitor and assess patient's chronic conditions and comorbid symptoms for stability, deterioration, or improvement     Problem: Discharge Planning  Goal: Discharge to home or other facility with appropriate resources  Outcome: Progressing  Flowsheets (Taken 7/11/2025 1945)  Discharge to home or other facility with appropriate resources: Identify barriers to discharge with patient and caregiver     Problem: Safety - Adult  Goal: Free from fall injury  Outcome: Progressing     Problem: Pain  Goal: Verbalizes/displays adequate comfort level or baseline comfort level  Outcome: Progressing  Flowsheets (Taken 7/11/2025 1945)  Verbalizes/displays adequate comfort level or baseline comfort level: Encourage patient to monitor pain and request assistance     Problem: Skin/Tissue Integrity  Goal: Skin integrity remains intact  Description: 1.  Monitor for areas of redness and/or skin breakdown  2.  Assess vascular access sites hourly  3.  Every 4-6 hours minimum:  Change oxygen saturation probe site  4.  Every 4-6 hours:  If on nasal continuous positive airway pressure, respiratory therapy assess nares and determine need for appliance change or resting period  Outcome: Progressing  Flowsheets (Taken 7/11/2025 1945)  Skin Integrity Remains Intact: Monitor for areas of redness and/or skin breakdown     Problem: ABCDS Injury Assessment  Goal: Absence of physical injury  Outcome: Progressing     Problem: Nutrition Deficit:  Goal: Optimize nutritional status  Outcome: Progressing     Problem: Neurosensory - Adult  Goal: Achieves stable or improved neurological status  Outcome: Progressing  Flowsheets (Taken 7/11/2025

## 2025-07-12 NOTE — PROGRESS NOTES
Occupational Therapy  Facility/Department: [address] [facility]  Rehabilitation Occupational Therapy Daily Treatment Note    Date: 25  Patient Name: Elio Almodovar       Room: P6A-6418/3263-01  MRN: [id number]  Account: [id number]   : 1955  (70 y.o.) Gender: male       Past Medical History:  has a past medical history of Cancer (HCC), Diabetes mellitus (HCC), History of alcohol abuse, History of blood transfusion, History of pancreatitis, Hypertension, and NPH (normal pressure hydrocephalus) (HCC).  Past Surgical History:   has a past surgical history that includes Prostatectomy; Appendectomy; Tonsillectomy; and Ventriculoperitoneal shunt (Right, 2025).    Restrictions  Restrictions/Precautions: Fall Risk  Other Position/Activity Restrictions: Elevate HOB 30 degrees; activity as tolerated    Subjective  Subjective: Pt seen bedside and agreeable to therapy. Pt reporting low back pain but did not rate. Denied incisional pain  Restrictions/Precautions: Fall Risk      Objective     Cognition  Overall Cognitive Status: Exceptions  Arousal/Alertness: Appropriate responses to stimuli  Following Commands: Follows one step commands with repetition;Follows one step commands with increased time  Safety Judgement: Decreased awareness of need for assistance;Decreased awareness of need for safety  Problem Solving: Assistance required to identify errors made;Assistance required to generate solutions;Assistance required to implement solutions  Insights: Decreased awareness of deficits  Initiation: Requires cues for some  Sequencing: Requires cues for some  Cognition Comment: Delayed processing and distractible  Orientation  Orientation Level: Oriented to person;Oriented to time;Oriented to place (partial situation)         ADL  Grooming/Oral Hygiene  Assistance Level: Supervision  Skilled Clinical Factors: seated in front of sink- able to locate items and complete without cuing to sequence task  Upper Extremity  Recommendations: Strengthening;ROM;Balance training;Functional mobility training;Endurance training;Gait training;Stair training;Neuromuscular re-education;Pain management;Safety education & training;Patient/Caregiver education & training;Equipment evaluation, education, & procurement;Modalities;Positioning;Self-Care / ADL;Home management training;Coordination training;Cognitive/Perceptual training    Goals  Patient Goals   Patient goals : Work on balance and being able to use a walker  Short Term Goals  Time Frame for Short Term Goals: 1.5 weeks from eval on 7/10: goals ongoing 7/12  Short Term Goal 1: Pt will complete toileting Min A  Short Term Goal 2: Pt will complete LB bathing w/ AE PRN Min A  Short Term Goal 3: Pt will complete LB dressing w/ AE PRN CGA  Short Term Goal 4: Pt will complete functional transfers and mobility w/ LRAD CGA  Short Term Goal 5: Pt will engage in BUE therex to address endurance/balance/strength needed to complete grooming tasks in stance with SBA  Additional Goals?: Yes  Long Term Goals  Time Frame for Long Term Goals : 3 weeks from eval on 7/10  Long Term Goal 1: Pt will complete toileting Mod I  Long Term Goal 2: Pt will complete LB bathing w/ AE PRN Mod I  Long Term Goal 3: Pt will complete LB dressing w/ AE PRN Mod I  Long Term Goal 4: Pt will complete functional transfers and mobility w/ LRAD Mod I  Long Term Goal 5: Pt will engage in BUE therex to address endurance/balance/strength needed to complete simple IADL (meal prep) with supervision  Long Term Goal 6: Address safety through cognitive assessment/activites and home DME needed for safe transition      Therapy Time   Individual Concurrent Group Co-treatment   Time In 0715         Time Out 0830         Minutes 75         Timed Code Treatment Minutes: 75 Minutes       GWEN Bucio/L

## 2025-07-13 VITALS
RESPIRATION RATE: 16 BRPM | BODY MASS INDEX: 29.84 KG/M2 | HEIGHT: 68 IN | TEMPERATURE: 98.1 F | WEIGHT: 196.87 LBS | HEART RATE: 53 BPM | SYSTOLIC BLOOD PRESSURE: 150 MMHG | OXYGEN SATURATION: 100 % | DIASTOLIC BLOOD PRESSURE: 74 MMHG

## 2025-07-13 LAB
GLUCOSE BLD-MCNC: 139 MG/DL (ref 70–99)
GLUCOSE BLD-MCNC: 175 MG/DL (ref 70–99)
GLUCOSE BLD-MCNC: 184 MG/DL (ref 70–99)
GLUCOSE BLD-MCNC: 191 MG/DL (ref 70–99)
PERFORMED ON: ABNORMAL

## 2025-07-13 PROCEDURE — 1280000000 HC REHAB R&B

## 2025-07-13 PROCEDURE — 6360000002 HC RX W HCPCS: Performed by: PHYSICAL MEDICINE & REHABILITATION

## 2025-07-13 PROCEDURE — 6370000000 HC RX 637 (ALT 250 FOR IP): Performed by: PHYSICAL MEDICINE & REHABILITATION

## 2025-07-13 PROCEDURE — 94760 N-INVAS EAR/PLS OXIMETRY 1: CPT

## 2025-07-13 RX ADMIN — FLUOXETINE HYDROCHLORIDE 20 MG: 20 CAPSULE ORAL at 08:55

## 2025-07-13 RX ADMIN — METHYLPHENIDATE HYDROCHLORIDE 5 MG: 10 TABLET ORAL at 08:56

## 2025-07-13 RX ADMIN — HEPARIN SODIUM 5000 UNITS: 5000 INJECTION INTRAVENOUS; SUBCUTANEOUS at 21:27

## 2025-07-13 RX ADMIN — PRAZOSIN HYDROCHLORIDE 1 MG: 1 CAPSULE ORAL at 21:28

## 2025-07-13 RX ADMIN — OXYCODONE HYDROCHLORIDE 10 MG: 10 TABLET ORAL at 21:28

## 2025-07-13 RX ADMIN — ATORVASTATIN CALCIUM 40 MG: 40 TABLET, FILM COATED ORAL at 21:26

## 2025-07-13 RX ADMIN — SENNOSIDES AND DOCUSATE SODIUM 1 TABLET: 8.6; 5 TABLET ORAL at 08:56

## 2025-07-13 RX ADMIN — ANTACID TABLETS 500 MG: 500 TABLET, CHEWABLE ORAL at 08:56

## 2025-07-13 RX ADMIN — PANTOPRAZOLE SODIUM 20 MG: 20 TABLET, DELAYED RELEASE ORAL at 08:55

## 2025-07-13 RX ADMIN — GABAPENTIN 100 MG: 100 CAPSULE ORAL at 08:56

## 2025-07-13 RX ADMIN — Medication 9 MG: at 21:26

## 2025-07-13 RX ADMIN — HEPARIN SODIUM 5000 UNITS: 5000 INJECTION INTRAVENOUS; SUBCUTANEOUS at 14:37

## 2025-07-13 RX ADMIN — OXYCODONE HYDROCHLORIDE 10 MG: 10 TABLET ORAL at 09:34

## 2025-07-13 RX ADMIN — SENNOSIDES AND DOCUSATE SODIUM 1 TABLET: 8.6; 5 TABLET ORAL at 21:26

## 2025-07-13 RX ADMIN — INSULIN LISPRO 1 UNITS: 100 INJECTION, SOLUTION INTRAVENOUS; SUBCUTANEOUS at 21:29

## 2025-07-13 RX ADMIN — METHYLPHENIDATE HYDROCHLORIDE 5 MG: 10 TABLET ORAL at 14:37

## 2025-07-13 RX ADMIN — GABAPENTIN 100 MG: 100 CAPSULE ORAL at 21:27

## 2025-07-13 RX ADMIN — METFORMIN HYDROCHLORIDE 500 MG: 500 TABLET ORAL at 08:56

## 2025-07-13 RX ADMIN — ZOLPIDEM TARTRATE 5 MG: 5 TABLET, FILM COATED ORAL at 21:29

## 2025-07-13 RX ADMIN — HEPARIN SODIUM 5000 UNITS: 5000 INJECTION INTRAVENOUS; SUBCUTANEOUS at 05:34

## 2025-07-13 RX ADMIN — GABAPENTIN 100 MG: 100 CAPSULE ORAL at 14:51

## 2025-07-13 RX ADMIN — INSULIN LISPRO 1 UNITS: 100 INJECTION, SOLUTION INTRAVENOUS; SUBCUTANEOUS at 17:04

## 2025-07-13 RX ADMIN — PANTOPRAZOLE SODIUM 20 MG: 20 TABLET, DELAYED RELEASE ORAL at 21:27

## 2025-07-13 RX ADMIN — METFORMIN HYDROCHLORIDE 500 MG: 500 TABLET ORAL at 17:04

## 2025-07-13 ASSESSMENT — PAIN SCALES - GENERAL
PAINLEVEL_OUTOF10: 7
PAINLEVEL_OUTOF10: 4
PAINLEVEL_OUTOF10: 6
PAINLEVEL_OUTOF10: 7
PAINLEVEL_OUTOF10: 7

## 2025-07-13 ASSESSMENT — PAIN DESCRIPTION - ORIENTATION
ORIENTATION: RIGHT
ORIENTATION: LOWER
ORIENTATION: LOWER

## 2025-07-13 ASSESSMENT — PAIN DESCRIPTION - ONSET: ONSET: ON-GOING

## 2025-07-13 ASSESSMENT — PAIN DESCRIPTION - FREQUENCY: FREQUENCY: OTHER (COMMENT)

## 2025-07-13 ASSESSMENT — PAIN DESCRIPTION - DESCRIPTORS
DESCRIPTORS: SHARP
DESCRIPTORS: ACHING
DESCRIPTORS: ACHING

## 2025-07-13 ASSESSMENT — PAIN DESCRIPTION - PAIN TYPE: TYPE: SURGICAL PAIN

## 2025-07-13 ASSESSMENT — PAIN DESCRIPTION - LOCATION
LOCATION: BACK
LOCATION: BACK
LOCATION: HEAD

## 2025-07-13 ASSESSMENT — PAIN - FUNCTIONAL ASSESSMENT
PAIN_FUNCTIONAL_ASSESSMENT: ACTIVITIES ARE NOT PREVENTED
PAIN_FUNCTIONAL_ASSESSMENT: PREVENTS OR INTERFERES SOME ACTIVE ACTIVITIES AND ADLS

## 2025-07-13 NOTE — PROGRESS NOTES
Patient admitted to rehab with normal pressure hydrocephalus.  A/Ox4. Transfers with walker x1. Mobility restrictions: WBAT. On regular ccc diet, tolerating well. Medications taken whole with thins. On heparin for DVT prophylaxis.  Skin: incision to head x2 and abd x3, scattered bruising, masd to gluteal slit. Oxygen: RA. LDA: NONE. Has been continent of bowel and incontinent of bladder. LBM 7/13. Chair/bed alarms in use and call light in reach. Will monitor for safety.

## 2025-07-13 NOTE — PLAN OF CARE
Problem: Chronic Conditions and Co-morbidities  Goal: Patient's chronic conditions and co-morbidity symptoms are monitored and maintained or improved  7/12/2025 2256 by Cierra Owens RN  Outcome: Progressing     Problem: Safety - Adult  Goal: Free from fall injury  7/12/2025 2256 by Cierra Owens RN  Outcome: Progressing     Problem: Pain  Goal: Verbalizes/displays adequate comfort level or baseline comfort level  7/12/2025 2256 by Cierra Owens RN  Outcome: Progressing     Problem: Skin/Tissue Integrity  Goal: Skin integrity remains intact  Description: 1.  Monitor for areas of redness and/or skin breakdown  2.  Assess vascular access sites hourly  3.  Every 4-6 hours minimum:  Change oxygen saturation probe site  4.  Every 4-6 hours:  If on nasal continuous positive airway pressure, respiratory therapy assess nares and determine need for appliance change or resting period  7/12/2025 2256 by Cierra Owens RN  Outcome: Progressing     Problem: ABCDS Injury Assessment  Goal: Absence of physical injury  7/12/2025 2256 by Cierra Owens RN  Outcome: Progressing     Problem: Skin/Tissue Integrity - Adult  Goal: Skin integrity remains intact  Description: 1.  Monitor for areas of redness and/or skin breakdown  2.  Assess vascular access sites hourly  3.  Every 4-6 hours minimum:  Change oxygen saturation probe site  4.  Every 4-6 hours:  If on nasal continuous positive airway pressure, respiratory therapy assess nares and determine need for appliance change or resting period  7/12/2025 2256 by Cierra Owens RN  Outcome: Progressing     Problem: Genitourinary - Adult  Goal: Absence of urinary retention  Outcome: Progressing     Problem: Infection - Adult  Goal: Absence of infection at discharge  Outcome: Progressing

## 2025-07-13 NOTE — PROGRESS NOTES
Department of Physical Medicine & Rehabilitation  Progress Note    Patient Identification:  Elio Almodovar  [id number]  : 1955  Admit date: 2025    Chief Complaint: Normal pressure hydrocephalus (HCC)    Subjective:   Patient states that he is feeling well and denies any new onset complaints.     ROS: No f/c, n/v, cp     Objective:  Patient Vitals for the past 24 hrs:   BP Temp Temp src Pulse Resp SpO2 Weight   25 2149 -- -- -- -- 16 -- --   25 2018 (!) 151/64 98.4 °F (36.9 °C) Oral 58 16 98 % --   25 1739 -- -- -- -- 18 -- --   25 1242 -- -- -- -- 18 -- --   25 1212 -- -- -- -- 18 -- --   25 0944 120/60 97.9 °F (36.6 °C) Oral 52 18 99 % --   25 0849 -- -- -- -- -- 95 % --   25 0653 -- -- -- -- 18 -- --   25 0623 -- -- -- -- 14 -- --   25 0523 -- -- -- -- -- -- 90.4 kg (199 lb 4.7 oz)     Const: Alert. No distress, pleasant.   HEENT: Right side VPS sites well approximated with staples. Normal sclera/conjunctiva. MMM.   CV: Regular rate and rhythm.   Resp: No respiratory distress. Lungs CTAB.   Abd: Soft, nontender, nondistended, NABS+   Ext: No edema.   Neuro: Alert, oriented, some impaired recall   Psych: Cooperative, appropriate mood and affect    Laboratory data: Available via EMR.   Last 24 hour lab  Recent Results (from the past 24 hours)   POCT Glucose    Collection Time: 25  8:55 AM   Result Value Ref Range    POC Glucose 237 (H) 70 - 99 mg/dl    Performed on ACCU-CHEK    POCT Glucose    Collection Time: 25 11:18 AM   Result Value Ref Range    POC Glucose 181 (H) 70 - 99 mg/dl    Performed on ACCU-CHEK    POCT Glucose    Collection Time: 25  4:17 PM   Result Value Ref Range    POC Glucose 117 (H) 70 - 99 mg/dl    Performed on ACCU-CHEK    POCT Glucose    Collection Time: 25  8:20 PM   Result Value Ref Range    POC Glucose 288 (H) 70 - 99 mg/dl    Performed on ACCU-CHEK        Therapy progress:  Physical  [employer] dose 1 and 2

## 2025-07-13 NOTE — PROGRESS NOTES
Department of Physical Medicine & Rehabilitation  Progress Note    Patient Identification:  Elio Almodovar  [id number]  : 1955  Admit date: 2025    Chief Complaint: Normal pressure hydrocephalus (HCC)    Subjective:   Patient states that he is feeling well and denies any new onset complaints.     ROS: No f/c, n/v, cp     Objective:  Patient Vitals for the past 24 hrs:   BP Temp Temp src Pulse Resp SpO2 Weight   25 1004 -- -- -- -- 18 -- --   25 0934 -- -- -- -- 18 -- --   25 0845 127/62 98.4 °F (36.9 °C) Oral 51 18 97 % --   25 0522 -- -- -- -- -- -- 89.3 kg (196 lb 13.9 oz)   25 2219 -- -- -- -- 16 -- --   25 2149 -- -- -- -- 16 -- --   25 (!) 151/64 98.4 °F (36.9 °C) Oral 58 16 98 % --     Const: Alert. No distress, pleasant.   HEENT: Right side VPS sites well approximated with staples. Normal sclera/conjunctiva. MMM.   CV: Regular rate and rhythm.   Resp: No respiratory distress. Lungs CTAB.   Abd: Soft, nontender, nondistended, NABS+   Ext: No edema.   Neuro: Alert, oriented, some impaired recall   Psych: Cooperative, appropriate mood and affect    Laboratory data: Available via EMR.   Last 24 hour lab  Recent Results (from the past 24 hours)   POCT Glucose    Collection Time: 25  8:20 PM   Result Value Ref Range    POC Glucose 288 (H) 70 - 99 mg/dl    Performed on ACCU-CHEK    POCT Glucose    Collection Time: 25  6:47 AM   Result Value Ref Range    POC Glucose 139 (H) 70 - 99 mg/dl    Performed on ACCU-CHEK    POCT Glucose    Collection Time: 25 11:16 AM   Result Value Ref Range    POC Glucose 175 (H) 70 - 99 mg/dl    Performed on ACCU-CHEK    POCT Glucose    Collection Time: 25  3:54 PM   Result Value Ref Range    POC Glucose 191 (H) 70 - 99 mg/dl    Performed on ACCU-CHEK        Therapy progress:  Physical therapy:  Bed Mobility:  Overall Assistance Level: Contact Guard Assist, Minimal Assistance  Sit>supine:

## 2025-07-13 NOTE — PROGRESS NOTES
Pt admitted with normal pressure hydrocephalus s/p laparoscopic R  shunt placement on 7/1. AO x4. Able to transfer using the DIANDRA day. On heparin for DVT prophylaxis. On regular diet, tolerating well. Takes meds whole with thins. Pt is incontinent with bladder and mixed incontinence with bowel. LBM is 7/11. Advised to call if in need of assistance. Call light within reach. Monitored accordingly.

## 2025-07-13 NOTE — PLAN OF CARE
Problem: Chronic Conditions and Co-morbidities  Goal: Patient's chronic conditions and co-morbidity symptoms are monitored and maintained or improved  7/13/2025 1000 by Jose Sun RN  Outcome: Progressing  Flowsheets (Taken 7/13/2025 1000)  Care Plan - Patient's Chronic Conditions and Co-Morbidity Symptoms are Monitored and Maintained or Improved: Monitor and assess patient's chronic conditions and comorbid symptoms for stability, deterioration, or improvement  7/12/2025 2256 by Cierra Owens RN  Outcome: Progressing     Problem: Safety - Adult  Goal: Free from fall injury  7/13/2025 1000 by Jose Sun RN  Outcome: Progressing  Flowsheets (Taken 7/13/2025 1000)  Free From Fall Injury: Instruct family/caregiver on patient safety  7/12/2025 2256 by Cierra Owens RN  Outcome: Progressing     Problem: Pain  Goal: Verbalizes/displays adequate comfort level or baseline comfort level  7/13/2025 1000 by Jose Sun RN  Outcome: Progressing  Flowsheets (Taken 7/13/2025 1000)  Verbalizes/displays adequate comfort level or baseline comfort level:   Administer analgesics based on type and severity of pain and evaluate response   Encourage patient to monitor pain and request assistance   Assess pain using appropriate pain scale   Implement non-pharmacological measures as appropriate and evaluate response  7/12/2025 2256 by Cierra Owens RN  Outcome: Progressing     Problem: Skin/Tissue Integrity  Goal: Skin integrity remains intact  Description: 1.  Monitor for areas of redness and/or skin breakdown  2.  Assess vascular access sites hourly  3.  Every 4-6 hours minimum:  Change oxygen saturation probe site  4.  Every 4-6 hours:  If on nasal continuous positive airway pressure, respiratory therapy assess nares and determine need for appliance change or resting period  7/13/2025 1000 by Jose Sun RN  Outcome: Progressing  Flowsheets (Taken 7/13/2025 1000)  Skin Integrity Remains Intact:   Monitor

## 2025-07-14 LAB
ANION GAP SERPL CALCULATED.3IONS-SCNC: 11 MMOL/L (ref 3–16)
BASOPHILS # BLD: 0 K/UL (ref 0–0.2)
BASOPHILS NFR BLD: 0.7 %
BUN SERPL-MCNC: 16 MG/DL (ref 7–20)
CALCIUM SERPL-MCNC: 9.5 MG/DL (ref 8.3–10.6)
CHLORIDE SERPL-SCNC: 101 MMOL/L (ref 99–110)
CO2 SERPL-SCNC: 25 MMOL/L (ref 21–32)
CREAT SERPL-MCNC: 1 MG/DL (ref 0.8–1.3)
DEPRECATED RDW RBC AUTO: 14.2 % (ref 12.4–15.4)
EOSINOPHIL # BLD: 0.3 K/UL (ref 0–0.6)
EOSINOPHIL NFR BLD: 3.9 %
GFR SERPLBLD CREATININE-BSD FMLA CKD-EPI: 80 ML/MIN/{1.73_M2}
GLUCOSE BLD-MCNC: 139 MG/DL (ref 70–99)
GLUCOSE BLD-MCNC: 187 MG/DL (ref 70–99)
GLUCOSE BLD-MCNC: 227 MG/DL (ref 70–99)
GLUCOSE BLD-MCNC: 230 MG/DL (ref 70–99)
GLUCOSE SERPL-MCNC: 140 MG/DL (ref 70–99)
HCT VFR BLD AUTO: 36.2 % (ref 40.5–52.5)
HGB BLD-MCNC: 12.2 G/DL (ref 13.5–17.5)
LYMPHOCYTES # BLD: 2.4 K/UL (ref 1–5.1)
LYMPHOCYTES NFR BLD: 34 %
MCH RBC QN AUTO: 30.3 PG (ref 26–34)
MCHC RBC AUTO-ENTMCNC: 33.6 G/DL (ref 31–36)
MCV RBC AUTO: 90.2 FL (ref 80–100)
MONOCYTES # BLD: 0.6 K/UL (ref 0–1.3)
MONOCYTES NFR BLD: 9 %
NEUTROPHILS # BLD: 3.7 K/UL (ref 1.7–7.7)
NEUTROPHILS NFR BLD: 52.4 %
PERFORMED ON: ABNORMAL
PLATELET # BLD AUTO: 177 K/UL (ref 135–450)
PMV BLD AUTO: 9.3 FL (ref 5–10.5)
POTASSIUM SERPL-SCNC: 4.2 MMOL/L (ref 3.5–5.1)
RBC # BLD AUTO: 4.02 M/UL (ref 4.2–5.9)
SODIUM SERPL-SCNC: 137 MMOL/L (ref 136–145)
WBC # BLD AUTO: 7 K/UL (ref 4–11)

## 2025-07-14 PROCEDURE — 97129 THER IVNTJ 1ST 15 MIN: CPT

## 2025-07-14 PROCEDURE — 1280000000 HC REHAB R&B

## 2025-07-14 PROCEDURE — 97110 THERAPEUTIC EXERCISES: CPT

## 2025-07-14 PROCEDURE — 94760 N-INVAS EAR/PLS OXIMETRY 1: CPT

## 2025-07-14 PROCEDURE — 85025 COMPLETE CBC W/AUTO DIFF WBC: CPT

## 2025-07-14 PROCEDURE — 36415 COLL VENOUS BLD VENIPUNCTURE: CPT

## 2025-07-14 PROCEDURE — 97530 THERAPEUTIC ACTIVITIES: CPT

## 2025-07-14 PROCEDURE — 80048 BASIC METABOLIC PNL TOTAL CA: CPT

## 2025-07-14 PROCEDURE — 97535 SELF CARE MNGMENT TRAINING: CPT

## 2025-07-14 PROCEDURE — 6370000000 HC RX 637 (ALT 250 FOR IP): Performed by: PHYSICAL MEDICINE & REHABILITATION

## 2025-07-14 PROCEDURE — 97130 THER IVNTJ EA ADDL 15 MIN: CPT

## 2025-07-14 PROCEDURE — 6360000002 HC RX W HCPCS: Performed by: PHYSICAL MEDICINE & REHABILITATION

## 2025-07-14 PROCEDURE — 97116 GAIT TRAINING THERAPY: CPT

## 2025-07-14 RX ORDER — ASPIRIN 81 MG/1
81 TABLET, CHEWABLE ORAL DAILY
Status: DISCONTINUED | OUTPATIENT
Start: 2025-07-15 | End: 2025-07-24 | Stop reason: HOSPADM

## 2025-07-14 RX ADMIN — ATORVASTATIN CALCIUM 40 MG: 40 TABLET, FILM COATED ORAL at 20:55

## 2025-07-14 RX ADMIN — INSULIN LISPRO 1 UNITS: 100 INJECTION, SOLUTION INTRAVENOUS; SUBCUTANEOUS at 11:50

## 2025-07-14 RX ADMIN — HEPARIN SODIUM 5000 UNITS: 5000 INJECTION INTRAVENOUS; SUBCUTANEOUS at 05:37

## 2025-07-14 RX ADMIN — GABAPENTIN 100 MG: 100 CAPSULE ORAL at 07:22

## 2025-07-14 RX ADMIN — INSULIN LISPRO 1 UNITS: 100 INJECTION, SOLUTION INTRAVENOUS; SUBCUTANEOUS at 20:55

## 2025-07-14 RX ADMIN — ZOLPIDEM TARTRATE 5 MG: 5 TABLET, FILM COATED ORAL at 20:55

## 2025-07-14 RX ADMIN — HEPARIN SODIUM 5000 UNITS: 5000 INJECTION INTRAVENOUS; SUBCUTANEOUS at 15:49

## 2025-07-14 RX ADMIN — SENNOSIDES AND DOCUSATE SODIUM 1 TABLET: 8.6; 5 TABLET ORAL at 20:55

## 2025-07-14 RX ADMIN — METHYLPHENIDATE HYDROCHLORIDE 5 MG: 10 TABLET ORAL at 07:21

## 2025-07-14 RX ADMIN — INSULIN LISPRO 1 UNITS: 100 INJECTION, SOLUTION INTRAVENOUS; SUBCUTANEOUS at 16:52

## 2025-07-14 RX ADMIN — HEPARIN SODIUM 5000 UNITS: 5000 INJECTION INTRAVENOUS; SUBCUTANEOUS at 20:55

## 2025-07-14 RX ADMIN — OXYCODONE HYDROCHLORIDE 10 MG: 10 TABLET ORAL at 15:56

## 2025-07-14 RX ADMIN — PANTOPRAZOLE SODIUM 20 MG: 20 TABLET, DELAYED RELEASE ORAL at 07:21

## 2025-07-14 RX ADMIN — GABAPENTIN 100 MG: 100 CAPSULE ORAL at 20:55

## 2025-07-14 RX ADMIN — METFORMIN HYDROCHLORIDE 500 MG: 500 TABLET ORAL at 07:22

## 2025-07-14 RX ADMIN — METHYLPHENIDATE HYDROCHLORIDE 5 MG: 10 TABLET ORAL at 15:49

## 2025-07-14 RX ADMIN — GABAPENTIN 100 MG: 100 CAPSULE ORAL at 15:49

## 2025-07-14 RX ADMIN — PRAZOSIN HYDROCHLORIDE 1 MG: 1 CAPSULE ORAL at 20:55

## 2025-07-14 RX ADMIN — METFORMIN HYDROCHLORIDE 500 MG: 500 TABLET ORAL at 16:52

## 2025-07-14 RX ADMIN — ANTACID TABLETS 500 MG: 500 TABLET, CHEWABLE ORAL at 07:21

## 2025-07-14 RX ADMIN — PANTOPRAZOLE SODIUM 20 MG: 20 TABLET, DELAYED RELEASE ORAL at 20:55

## 2025-07-14 RX ADMIN — FLUOXETINE HYDROCHLORIDE 20 MG: 20 CAPSULE ORAL at 07:21

## 2025-07-14 ASSESSMENT — PAIN DESCRIPTION - DESCRIPTORS: DESCRIPTORS: ACHING

## 2025-07-14 ASSESSMENT — PAIN SCALES - GENERAL
PAINLEVEL_OUTOF10: 7
PAINLEVEL_OUTOF10: 0
PAINLEVEL_OUTOF10: 5

## 2025-07-14 ASSESSMENT — PAIN DESCRIPTION - LOCATION: LOCATION: BACK

## 2025-07-14 ASSESSMENT — PAIN DESCRIPTION - ORIENTATION: ORIENTATION: LOWER

## 2025-07-14 ASSESSMENT — PAIN - FUNCTIONAL ASSESSMENT: PAIN_FUNCTIONAL_ASSESSMENT: ACTIVITIES ARE NOT PREVENTED

## 2025-07-14 NOTE — PROGRESS NOTES
Occupational Therapy  Facility/Department: [address] [facility]  Rehabilitation Occupational Therapy Daily AM and PM Treatment Note    Date: 25  Patient Name: Elio Almodovar       Room: P8F-1191/3263-01  MRN: [id number]  Account: [id number]   : 1955  (70 y.o.) Gender: male           Additional Pertinent Hx: Per Dr. Tavarez H&P \"Patient is a 71 yo M with pmh HTN, DM2, prostate cancer, depression, h/o etoh abuse, and recently diagnosed normal pressure hydrocephalus who initially presented to the [facility] on 2025 for placement of right  shunt (with Dr. Diaz). Post op course was notable for acute ischemic stroke in the right frontal lobe along the approach of VPS catheter. Neurology consulted, stroke possibly from local arterial compression. Plan is to start ASA 81 mg 14 days post-op. Course was also complicated by depression with suicidal ideation. Telehealth Psychiatry consult was completed. Recommendation is for outpatient Psych follow-up.  Patient now presents to ARU with impaired mobility, self-care, and cognition below his baseline.\"        Past Medical History:  has a past medical history of Cancer (HCC), Diabetes mellitus (HCC), History of alcohol abuse, History of blood transfusion, History of pancreatitis, Hypertension, and NPH (normal pressure hydrocephalus) (HCC).  Past Surgical History:   has a past surgical history that includes Prostatectomy; Appendectomy; Tonsillectomy; and Ventriculoperitoneal shunt (Right, 2025).    Restrictions  Restrictions/Precautions: Fall Risk  Other Position/Activity Restrictions: Elevate HOB 30 degrees; activity as tolerated    Subjective  Subjective: Pt met in dept, reports no pain, agreeable to OT treat.  Restrictions/Precautions: Fall Risk             Objective     Cognition  Overall Cognitive Status: Exceptions  Arousal/Alertness: Appropriate responses to stimuli  Following Commands: Follows one step commands with repetition;Follows one step

## 2025-07-14 NOTE — PLAN OF CARE
Problem: Chronic Conditions and Co-morbidities  Goal: Patient's chronic conditions and co-morbidity symptoms are monitored and maintained or improved  7/14/2025 1031 by Elida Garcia RN  Outcome: Progressing  Flowsheets (Taken 7/14/2025 0717)  Care Plan - Patient's Chronic Conditions and Co-Morbidity Symptoms are Monitored and Maintained or Improved: Monitor and assess patient's chronic conditions and comorbid symptoms for stability, deterioration, or improvement     Problem: Discharge Planning  Goal: Discharge to home or other facility with appropriate resources  7/14/2025 1031 by Elida Garcia RN  Outcome: Progressing  Flowsheets (Taken 7/14/2025 0717)  Discharge to home or other facility with appropriate resources: Identify barriers to discharge with patient and caregiver     Problem: Safety - Adult  Goal: Free from fall injury  7/14/2025 1031 by Elida Garcia RN  Outcome: Progressing  Flowsheets (Taken 7/14/2025 1030)  Free From Fall Injury: Instruct family/caregiver on patient safety     Problem: Pain  Goal: Verbalizes/displays adequate comfort level or baseline comfort level  7/14/2025 1031 by Elida Garcia RN  Outcome: Progressing  Flowsheets (Taken 7/14/2025 0717)  Verbalizes/displays adequate comfort level or baseline comfort level:   Encourage patient to monitor pain and request assistance   Administer analgesics based on type and severity of pain and evaluate response   Assess pain using appropriate pain scale     Problem: Skin/Tissue Integrity  Goal: Skin integrity remains intact  Description: 1.  Monitor for areas of redness and/or skin breakdown  2.  Assess vascular access sites hourly  3.  Every 4-6 hours minimum:  Change oxygen saturation probe site  4.  Every 4-6 hours:  If on nasal continuous positive airway pressure, respiratory therapy assess nares and determine need for appliance change or resting period  7/14/2025 1031 by Elida Garcia RN  Outcome:

## 2025-07-14 NOTE — PATIENT CARE CONFERENCE
[facility]  Inpatient Rehabilitation  Weekly Team Conference Note      Date: 7/15/2025  Patient Name:  Elio Almodovar    MRN: [id number]  : 1955  Gender: Male  Physician: Dr. Corby GAMING  Diagnosis: Normal pressure hydrocephalus (HCC) [G91.2]    CASE MANAGEMENT  Assessment: Goal is home with wife, agreeable to home care services.       PHYSICAL THERAPY    Functional Mobility    Bed Mobility  Overall Assistance Level: Contact Guard Assist  Roll Left  Assistance Level: Contact guard assist  Roll Right  Assistance Level: Contact guard assist  Sit to Supine  Assistance Level: Contact guard assist  Supine to Sit  Assistance Level: Contact guard assist  Scooting  Assistance Level: Contact guard assist    Balance  Sitting Balance: Contact guard assistance  Standing Balance: Contact guard assistance    Transfers  Surface: From chair with arms  Additional Factors: Set-up;Verbal cues    Sit to Stand  Assistance Level: Minimal assistance  Skilled Clinical Factors: Min A to correct posterior lean    Stand to Sit  Assistance Level: Minimal assistance  Skilled Clinical Factors: Cues to completely turn before sitting; cues for hand placement; Min A to correct posterior lean    Stand Pivot  Assistance Level: Minimal assistance  Skilled Clinical Factors: RW support, cues to turn completely before sitting, Min A to correct intermittent posterior LOB.    Car Transfer  Assistance Level: Contact guard assist  Skilled Clinical Factors: Cues for hand placement.        Environmental Mobility  Ambulation  Surface: Level surface  Device: Rolling walker  Distance: 150'  Activity: Within Unit  Activity Comments: Slow speed, progressive weakness/intermittent freezing of LLE noted; however, pt able to re-initiate gait with less cueing, no phyiscal assist today.  Assistance Level: Contact guard assist  Gait Deviations: Decreased step length left;Decreased weight shift left;Slow moses;Narrow base of

## 2025-07-14 NOTE — PROGRESS NOTES
Pt admitted with normal pressure hydrocephalus s/p laparoscopic R  shunt placement on 7/1. AO x4. Able to transfer using the walker, WBAT. On heparin for DVT prophylaxis. On regular diet, tolerating well. Takes meds whole with thins. Pt is incontinent with bladder and mixed incontinence with bowel. LBM is 7/13. Advised to call if in need of assistance. Call light within reach. Monitored accordingly.

## 2025-07-14 NOTE — PROGRESS NOTES
Patient admitted to rehab with normal pressure hydrocephalus.  A/Ox4. Transfers with walker, stedy PRN. Mobility restrictions: WBAT. On regular diet, tolerating well. Medications taken whole with thins. On heparin for DVT prophylaxis.  Skin: surgical incision to right side of head and abdomen. Oxygen: RA. LDA: NONE. Has been continent of bowel and continent of bladder. LBM 7/13. Chair/bed alarms in use and call light in reach.

## 2025-07-14 NOTE — PLAN OF CARE
Problem: Chronic Conditions and Co-morbidities  Goal: Patient's chronic conditions and co-morbidity symptoms are monitored and maintained or improved  7/13/2025 2257 by Cierra Owens RN  Outcome: Progressing     Problem: Discharge Planning  Goal: Discharge to home or other facility with appropriate resources  Outcome: Progressing     Problem: Safety - Adult  Goal: Free from fall injury  7/13/2025 2257 by Cierra Owens RN  Outcome: Progressing     Problem: Pain  Goal: Verbalizes/displays adequate comfort level or baseline comfort level  7/13/2025 2257 by Cierra Owens RN  Outcome: Progressing     Problem: Skin/Tissue Integrity  Goal: Skin integrity remains intact  Description: 1.  Monitor for areas of redness and/or skin breakdown  2.  Assess vascular access sites hourly  3.  Every 4-6 hours minimum:  Change oxygen saturation probe site  4.  Every 4-6 hours:  If on nasal continuous positive airway pressure, respiratory therapy assess nares and determine need for appliance change or resting period  7/13/2025 2257 by Cierra Owens RN  Outcome: Progressing     Problem: ABCDS Injury Assessment  Goal: Absence of physical injury  7/13/2025 2257 by Cierra Owens RN  Outcome: Progressing     Problem: Nutrition Deficit:  Goal: Optimize nutritional status  Outcome: Progressing     Problem: Skin/Tissue Integrity - Adult  Goal: Skin integrity remains intact  Description: 1.  Monitor for areas of redness and/or skin breakdown  2.  Assess vascular access sites hourly  3.  Every 4-6 hours minimum:  Change oxygen saturation probe site  4.  Every 4-6 hours:  If on nasal continuous positive airway pressure, respiratory therapy assess nares and determine need for appliance change or resting period  7/13/2025 2257 by Cierra Owens RN  Outcome: Progressing     Problem: Infection - Adult  Goal: Absence of infection at discharge  Outcome: Progressing

## 2025-07-14 NOTE — PROGRESS NOTES
Physical Therapy  Facility/Department: [address] [facility]  Rehabilitation Physical Therapy Treatment Note    NAME: Elio Almodovar  : 1955 (70 y.o.)  MRN: [id number]  CODE STATUS: Full Code    Date of Service: 25       Restrictions:  Restrictions/Precautions: Fall Risk  Position Activity Restriction  Other Position/Activity Restrictions: Elevate HOB 30 degrees; activity as tolerated     SUBJECTIVE  Subjective: Pt denies pain.  Feels like he is getting better.       OBJECTIVE  Orientation  Overall Orientation Status: Within Functional Limits  Orientation Level: Oriented to person;Oriented to time;Oriented to place;Oriented to situation    Functional Mobility  Bed Mobility  Overall Assistance Level: Contact Guard Assist  Roll Left  Assistance Level: Contact guard assist  Roll Right  Assistance Level: Contact guard assist  Sit to Supine  Assistance Level: Contact guard assist  Supine to Sit  Assistance Level: Contact guard assist  Scooting  Assistance Level: Contact guard assist  Balance  Sitting Balance: Contact guard assistance  Standing Balance: Contact guard assistance  Transfers  Surface: From chair with arms  Additional Factors: Set-up;Verbal cues  Sit to Stand  Assistance Level: Minimal assistance  Skilled Clinical Factors: Min A to correct posterior lean  Stand to Sit  Assistance Level: Minimal assistance  Skilled Clinical Factors: Cues to completely turn before sitting; cues for hand placement; Min A to correct posterior lean  Stand Pivot  Assistance Level: Minimal assistance  Skilled Clinical Factors: RW support, cues to turn completely before sitting, Min A to correct intermittent posterior LOB.  Car Transfer  Assistance Level: Contact guard assist  Skilled Clinical Factors: Cues for hand placement.      Environmental Mobility  Ambulation  Surface: Level surface  Device: Rolling walker  Distance: 150'  Activity: Within Unit  Activity Comments: Slow speed, progressive weakness/intermittent freezing

## 2025-07-14 NOTE — PROGRESS NOTES
PHYSICAL THERAPY  Progress Note   Second Session    Patient Name: Elio Almodovar  Medical Record Number: [id number]    Treatment Diagnosis: Decreased functional mobility; Decreased strength; Decreased balance; Decreased safety awareness      Chart Reviewed: Yes   Restrictions/Precautions: Fall Risk Other Position/Activity Restrictions: Elevate HOB 30 degrees; activity as tolerated   Additional Pertinent Hx: Pt is 70 y.o. male s/p RIGHT VENTRICULOPERITONEAL SHUNT PLACEMENT WITH LAPAROSCOPIC ASSIST 7/1. PMHx: prostate cancer, NPH, HTN. pancreatitis, diabetes         Subjective:  Pt agreeable to activity.     Objective    Pt practiced sit < > stand from wc > RW x 10.  He required max, repeated cues for technique (scoot forward, lean forward, get feet underneath - prior to standing, and for LE's to touch chair prior to sitting), and extended time to complete d/t slow processing, but did so with CGA-SBA.  He still tends to lean backward during transitional movements when no cueing is provided, but did better to follow cues without physical assist today.  Pt practiced reaching for ball on stool, then standing and placing ball in basket at chest-height, x 10, Min A to correct intermittent posterior LOB.  Pt took seated rest, then stood and ambulated 150' with RW, moving slowly, intermittently slowing d/t difficulty initiating movement with LLE, but was able to self-correct and continue on without physical or contact assist (SBA throughout).  Standing RLE/LLE toe-tap on 6\" cone, 2 x 10, Min A to correct several severe LOB.  Standing with RW support: Alternating shoulder Flex x 10, (B) shoulder Flex x 10, CGA-SBA, no LOB.     Assessment: Pt agreeable to activity, able to ambulate with improved stability.  Still struggling to consistently complete transfers independently d/t slow movement speed, difficulty remembering transfer technique.       Safety Device - Type of devices:  [x]  All fall risk precautions in place [] Bed

## 2025-07-14 NOTE — PROGRESS NOTES
of SLP in 2024 in speech and cognition  2. Speech Diagnosis: Audible and intelligible connected speech without dysarthria  3. Communication Diagnosis: Pt demonstrated functional concrete receptive language and verbal expressive language skills. Breakdown with complex , lengthy processing but concern for recall (ie complex questions;multiple step commands/sequential commands)  4. Dysphagia Diagnosis: Swallow function appears WFL. Mastication appeared functional; bolus manipulation appeared functional; unable to r/o potential delayed swallow. No anterior loss; no overt clinical s/s of aspiration or pt complaints post swallow. Plan to continue current regular diet consistencies as ordered and SLP f/ux 1   Prognosis:  good, guarded Barriers toward progress:  co-morbidities, medical dx, prior level of function    Current Diet Order:ADULT DIET; Regular; 4 carb choices (60 gm/meal); Safety Tray; Safety Tray (Disposables)  ADULT ORAL NUTRITION SUPPLEMENT; Dinner; Diabetic Oral Supplement   Recommended: Swallowing Precautions:  Eat or Feed Slowly, Remain Upright 30-45 min , Small Bites and Sips , Swallow 2 times per bite , Upright as possible with all PO intake      TREATMENT DATE:  7/14/2025 TX interventions: Cognitive-Linguistic   Tx session 1    Pain: Denies       Subjective: behavior:      Patient Response: Pleasant  and Cooperative; seen in in treatment office  Endurance: Adequate for participation in SLP sessions     Safety: Medical DX; medical co-morbidities; previous level of function   Objective:  Goals    Dysphagia Goals-:Pt goal is to continue regular food and thin liquid consistencies        Therapy plan for f/u x 1 and monitor if any staff/pt/family complaints     The patient will tolerate recommended diet without observed clinical signs of aspiration Not targeted directly; no reports of problems by pt and none identified by staff this date/time      Cognitive-Linguistic Goals: Pt goal is to return home, care

## 2025-07-14 NOTE — PROGRESS NOTES
gabapentin, prn oxycodone     PPx  -DVT: heparin  -GI: pantoprazole    Rehab Progress: Making progress. Overall CGA-Ramone for mobility, up to maxA for ADLs. Barriers include: balance, cognition.   Anticipated Dispo: home with spouse  Services: TBD  DME: TBD  ELOS: 2-3 weeks      Elida Tavarez MD 7/14/2025, 8:45 AM

## 2025-07-15 LAB
GLUCOSE BLD-MCNC: 169 MG/DL (ref 70–99)
GLUCOSE BLD-MCNC: 172 MG/DL (ref 70–99)
GLUCOSE BLD-MCNC: 183 MG/DL (ref 70–99)
GLUCOSE BLD-MCNC: 248 MG/DL (ref 70–99)
PERFORMED ON: ABNORMAL

## 2025-07-15 PROCEDURE — 6360000002 HC RX W HCPCS: Performed by: PHYSICAL MEDICINE & REHABILITATION

## 2025-07-15 PROCEDURE — 97530 THERAPEUTIC ACTIVITIES: CPT

## 2025-07-15 PROCEDURE — 97130 THER IVNTJ EA ADDL 15 MIN: CPT

## 2025-07-15 PROCEDURE — 6370000000 HC RX 637 (ALT 250 FOR IP): Performed by: PHYSICAL MEDICINE & REHABILITATION

## 2025-07-15 PROCEDURE — 97110 THERAPEUTIC EXERCISES: CPT

## 2025-07-15 PROCEDURE — 97535 SELF CARE MNGMENT TRAINING: CPT

## 2025-07-15 PROCEDURE — 94760 N-INVAS EAR/PLS OXIMETRY 1: CPT

## 2025-07-15 PROCEDURE — 97129 THER IVNTJ 1ST 15 MIN: CPT

## 2025-07-15 PROCEDURE — 1280000000 HC REHAB R&B

## 2025-07-15 PROCEDURE — 97116 GAIT TRAINING THERAPY: CPT

## 2025-07-15 RX ADMIN — INSULIN LISPRO 1 UNITS: 100 INJECTION, SOLUTION INTRAVENOUS; SUBCUTANEOUS at 16:24

## 2025-07-15 RX ADMIN — PANTOPRAZOLE SODIUM 20 MG: 20 TABLET, DELAYED RELEASE ORAL at 20:31

## 2025-07-15 RX ADMIN — ASPIRIN 81 MG 81 MG: 81 TABLET ORAL at 07:49

## 2025-07-15 RX ADMIN — METHYLPHENIDATE HYDROCHLORIDE 5 MG: 10 TABLET ORAL at 13:35

## 2025-07-15 RX ADMIN — METHYLPHENIDATE HYDROCHLORIDE 5 MG: 10 TABLET ORAL at 07:48

## 2025-07-15 RX ADMIN — METFORMIN HYDROCHLORIDE 500 MG: 500 TABLET ORAL at 07:48

## 2025-07-15 RX ADMIN — FLUOXETINE HYDROCHLORIDE 20 MG: 20 CAPSULE ORAL at 07:48

## 2025-07-15 RX ADMIN — SENNOSIDES AND DOCUSATE SODIUM 1 TABLET: 8.6; 5 TABLET ORAL at 20:31

## 2025-07-15 RX ADMIN — PANTOPRAZOLE SODIUM 20 MG: 20 TABLET, DELAYED RELEASE ORAL at 07:48

## 2025-07-15 RX ADMIN — PRAZOSIN HYDROCHLORIDE 1 MG: 1 CAPSULE ORAL at 20:41

## 2025-07-15 RX ADMIN — GABAPENTIN 100 MG: 100 CAPSULE ORAL at 07:48

## 2025-07-15 RX ADMIN — Medication 9 MG: at 20:32

## 2025-07-15 RX ADMIN — INSULIN LISPRO 1 UNITS: 100 INJECTION, SOLUTION INTRAVENOUS; SUBCUTANEOUS at 20:41

## 2025-07-15 RX ADMIN — METFORMIN HYDROCHLORIDE 500 MG: 500 TABLET ORAL at 16:24

## 2025-07-15 RX ADMIN — GABAPENTIN 100 MG: 100 CAPSULE ORAL at 13:36

## 2025-07-15 RX ADMIN — HEPARIN SODIUM 5000 UNITS: 5000 INJECTION INTRAVENOUS; SUBCUTANEOUS at 13:35

## 2025-07-15 RX ADMIN — ATORVASTATIN CALCIUM 40 MG: 40 TABLET, FILM COATED ORAL at 20:31

## 2025-07-15 RX ADMIN — HEPARIN SODIUM 5000 UNITS: 5000 INJECTION INTRAVENOUS; SUBCUTANEOUS at 22:30

## 2025-07-15 RX ADMIN — HEPARIN SODIUM 5000 UNITS: 5000 INJECTION INTRAVENOUS; SUBCUTANEOUS at 05:48

## 2025-07-15 RX ADMIN — ANTACID TABLETS 500 MG: 500 TABLET, CHEWABLE ORAL at 07:48

## 2025-07-15 RX ADMIN — GABAPENTIN 100 MG: 100 CAPSULE ORAL at 20:31

## 2025-07-15 ASSESSMENT — PAIN SCALES - GENERAL: PAINLEVEL_OUTOF10: 6

## 2025-07-15 NOTE — PROGRESS NOTES
Recommendations  Equipment Needed: Yes  Mobility Devices: Walker  Walker: Rolling    Goals  Patient Goals   Patient Goals : To go home  Short Term Goals  Time Frame for Short Term Goals: 7-10 days  Short Term Goal 1: Bed mobility Supervision  Short Term Goal 2: Transfers CGA  Short Term Goal 3: Ambulation 150' with CGA, RW - met 7/15/25  Short Term Goal 4: Ascend/Descend 12 steps with Min A  Short Term Goal 5: Ascend/Descend curb step with CGA  Long Term Goals  Time Frame for Long Term Goals : 14-21 days  Long Term Goal 1: Bed mobility (I)  Long Term Goal 2: Transfers (I)  Long Term Goal 3: Ambulation 150' with RW (I)  Long Term Goal 4: Ascend/Descend 12 steps (I)  Long Term Goal 5: Ascend/Descend curb step (I)    PLAN OF CARE/SAFETY  Physical Therapy Plan  General Plan: 5-7 times per week  Days Per Week: 5 Days  Hours Per Day: 2 hours  Therapy Duration: 3 Weeks  Current Treatment Recommendations: Strengthening;Functional mobility training;Gait training;Safety education & training;Equipment evaluation, education, & procurement;Therapeutic activities  Safety Devices  Type of Devices: Gait belt (pt returned to room with transport)    EDUCATION  Education  Education Provided: Role of Therapy;Plan of Care;Mobility Training;DME/Home Modifications;Transfer Training;Fall Prevention Strategies;ADL Function  Education Method: Demonstration;Verbal  Barriers to Learning: Cognition  Education Outcome: Continued education needed        Therapy Time   Individual Concurrent Group Co-treatment   Time In 1000         Time Out 1045         Minutes 45           Timed Code Treatment Minutes: 45 Minutes       Clare Perdue, PT, 07/15/25 at 3:50 PM       PHYSICAL THERAPY  Progress Note   Second Session    Subjective: Pt met in w/c in gym, reporting feeling okay this afternoon. Denies pain.     Objective: STS to RW CGA with max cues for sequencing and anterior weight shift to avoid posterior lean. Pt ambulated 175' with RW CGA (see

## 2025-07-15 NOTE — PROGRESS NOTES
Treatment limited secondary to decreased cognition;Patient limited by endurance  Discharge Recommendations: Continue to assess pending progress;Patient would benefit from continued therapy after discharge  OT Equipment Recommendations  Equipment Needed: Yes  Other: Pt reports having Grab bars in shower; Grab bars around toilet; Built-in shower seat; Shower chair; Hand-held shower  - Clarify with family on home equipment.  Safety Devices  Safety Devices in place: Yes  Type of devices: Gait belt;Left in chair;Chair alarm in place;Call light within reach;Nurse notified    Patient Education  Education  Education Given To: Patient  Education Provided: Role of Therapy;Safety;Transfer Training;Cognition;Fall Prevention Strategies;ADL Function  Education Provided Comments: LH sponge  Education Method: Demonstration;Verbal  Barriers to Learning: Cognition  Education Outcome: Verbalized understanding;Continued education needed    Plan  Occupational Therapy Plan  Times Per Week: 5-6x  Times Per Day: Twice a day  Days Per Week: 5 Days  Hours Per Day: 1.5 hours  Therapy Duration: 3 Weeks  Current Treatment Recommendations: Strengthening;ROM;Balance training;Functional mobility training;Endurance training;Gait training;Stair training;Neuromuscular re-education;Pain management;Safety education & training;Patient/Caregiver education & training;Equipment evaluation, education, & procurement;Modalities;Positioning;Self-Care / ADL;Home management training;Coordination training;Cognitive/Perceptual training    Goals  Patient Goals   Patient goals : Work on balance and being able to use a walker  Short Term Goals  Time Frame for Short Term Goals: 1.5 weeks from eval on 7/10: goals ongoing 7/12  Short Term Goal 1: Pt will complete toileting Min A  Short Term Goal 2: Pt will complete LB bathing w/ AE PRN Min A  Short Term Goal 3: Pt will complete LB dressing w/ AE PRN CGA  Short Term Goal 4: Pt will complete functional transfers and

## 2025-07-15 NOTE — PROGRESS NOTES
Department of Physical Medicine & Rehabilitation  Progress Note    Patient Identification:  Elio Almodovar  [id number]  : 1955  Admit date: 2025    Chief Complaint: Normal pressure hydrocephalus (HCC)    Subjective:   No acute events overnight.   Patient seen this am sitting up in gym. He reports making some progress with balance. He denies any new concerns. Later spoke with therapy team who note ongoing intermittent significant posterior lean with high fall risk.   Labs reviewed.     ROS: No f/c, n/v, cp     Objective:  Patient Vitals for the past 24 hrs:   BP Temp Temp src Pulse Resp SpO2 Weight   07/15/25 0842 -- -- -- -- -- 97 % --   07/15/25 0747 (!) 143/70 98.4 °F (36.9 °C) Oral 51 18 98 % --   07/15/25 0520 -- -- -- -- -- -- 90.6 kg (199 lb 11.8 oz)   25 (!) 148/45 -- -- -- -- -- --   25 (!) 128/111 98.2 °F (36.8 °C) Oral 57 16 95 % --   25 1626 -- -- -- -- 16 -- --   25 1556 -- -- -- -- 17 -- --     Const: Alert. No distress, pleasant.   HEENT: Right side VPS sites well approximated with staples. Normal sclera/conjunctiva. MMM.   CV: Regular rate and rhythm.   Resp: No respiratory distress. Lungs CTAB.   Abd: Soft, nontender, nondistended, NABS+   Ext: No edema.   Neuro: Alert, oriented, some impaired recall   Psych: Cooperative, appropriate mood and affect    Laboratory data: Available via EMR.   Last 24 hour lab  Recent Results (from the past 24 hours)   POCT Glucose    Collection Time: 25  3:56 PM   Result Value Ref Range    POC Glucose 230 (H) 70 - 99 mg/dl    Performed on ACCU-CHEK    POCT Glucose    Collection Time: 25  8:50 PM   Result Value Ref Range    POC Glucose 227 (H) 70 - 99 mg/dl    Performed on ACCU-CHEK    POCT Glucose    Collection Time: 07/15/25  7:16 AM   Result Value Ref Range    POC Glucose 169 (H) 70 - 99 mg/dl    Performed on ACCU-CHEK    POCT Glucose    Collection Time: 07/15/25 11:10 AM   Result Value Ref Range    POC Glucose

## 2025-07-15 NOTE — PLAN OF CARE
Problem: Chronic Conditions and Co-morbidities  Goal: Patient's chronic conditions and co-morbidity symptoms are monitored and maintained or improved  Outcome: Progressing  Flowsheets (Taken 7/15/2025 0939)  Care Plan - Patient's Chronic Conditions and Co-Morbidity Symptoms are Monitored and Maintained or Improved: Monitor and assess patient's chronic conditions and comorbid symptoms for stability, deterioration, or improvement     Problem: Safety - Adult  Goal: Free from fall injury  Outcome: Progressing  Flowsheets (Taken 7/15/2025 0939)  Free From Fall Injury: Instruct family/caregiver on patient safety     Problem: Pain  Goal: Verbalizes/displays adequate comfort level or baseline comfort level  Outcome: Progressing  Flowsheets (Taken 7/15/2025 0939)  Verbalizes/displays adequate comfort level or baseline comfort level:   Encourage patient to monitor pain and request assistance   Administer analgesics based on type and severity of pain and evaluate response   Implement non-pharmacological measures as appropriate and evaluate response   Assess pain using appropriate pain scale     Problem: Skin/Tissue Integrity  Goal: Skin integrity remains intact  Description: 1.  Monitor for areas of redness and/or skin breakdown  2.  Assess vascular access sites hourly  3.  Every 4-6 hours minimum:  Change oxygen saturation probe site  4.  Every 4-6 hours:  If on nasal continuous positive airway pressure, respiratory therapy assess nares and determine need for appliance change or resting period  Outcome: Progressing  Flowsheets (Taken 7/15/2025 0939)  Skin Integrity Remains Intact:   Monitor for areas of redness and/or skin breakdown   Assess vascular access sites hourly   Turn and reposition as indicated   Positioning devices   Check visual cues for pain   Pressure redistribution bed/mattress (bed type)  Note: Encouraging pt to call when wet, q2hrs bathroom breaks, blue wipes and zinc applied to area to gluteal slit, skin

## 2025-07-15 NOTE — PROGRESS NOTES
OT communicated to nursing staff that pt had mentioned 3 times of hurting self such as using call light to hang self and using ties on pants to do the same. When OT asked pt about it he said \"its just how I joke\". When this nurse asked pt about if he was having thoughts of suicide pt denies any thoughts or plans to do so. Pt already on safety meal tray charge nurse and manager notified. Removed any cords from pt room will further assess pt when he returns from with therapy staff.

## 2025-07-15 NOTE — PROGRESS NOTES
Patient admitted to rehab with normal pressure hydrocephalus.  A/Ox4. Transfers with walker x1. Mobility restrictions: WBAT. On regular ccc diet, tolerating well. Medications taken whole with thins. On rosalie hose, heparin for DVT prophylaxis.  Skin: masd to gluteal slit with blue wipes and zinc applied, redness to heels, scattered bruising. Oxygen: RA. LDA: NONE. Has been continent of bowel and incontinent of bladder. LBM 7/13. Chair/bed alarms in use and call light in reach. Will monitor for safety.

## 2025-07-15 NOTE — PROGRESS NOTES
Pt awake and AAO sitting up in chair requesting to get into bed. Pt denies pain. Admitted to ARU with normal pressure hydrocephalus and s/p shunt placement on 7/1/25 with Dr. Diaz. SI x2 to L scalp areas well-approximated with staples and GARRETT. Also, glued SI to abdomen and GARRETT./  Lungs CTA. No sob or cough. On RA. Belly round and soft with active BS. LBM yesterday. Pt can be incontinent of urine. Pt up from sit to stand with mod A and max cues on s/p transfer to W/C with GB. Pt to bed and up from sit to stand with mod A and GB and pt did not tolerate as he was trying to hold onto this writer and pulled me down to bed. Pt unsteady and needs cues repeated for feet and hand placement during transfer. Pt incontinent of urine. Depends changed and gown donned. Heels floated. No edema noted. Call light in reach. Bed alarm on.

## 2025-07-15 NOTE — PROGRESS NOTES
ACUTE REHAB UNIT  SPEECH/LANGUAGE PATHOLOGY      [x] Daily  [] Weekly Care Conference Note  [] Discharge    Patient:Elio Almodovar      :1955  MRN:[id number]  Rehab Dx/Hx: Normal pressure hydrocephalus (HCC) [G91.2]   CT head 2025: IMPRESSION: 1. Stable head CT.  CT Head 2025: IMPRESSION: 1.  Unchanged size and configuration of the ventricles with unchanged positioning of right  shunt catheter.2.  Trace layering intraventricular hemorrhage in the left temporal horn, new/increased from prior.3.  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  CTA Head Neck 2025: IMPRESSION:No flow significant stenosis within the head or neck.  XR Shunt series 2024: IMPRESSION:1. The orientation is reversed, findings will be discussed with referring physician. IMPRESSION:1. The orientation is reversed, findings will be discussed with referring physician  MRI Brain: 7/3/2025: IMPRESSION:Acute zone of infarction in the right anterior frontal lobe.Cardiomegaly, with ventriculostomy catheter in place.  No chest XR this admit encounter. Last chest XR on available chart review   Onset: 7/3/2025 Date of Admit: 2025  Room #: [id number]-3232/3263-01    Precautions: falls, seizures, and post shunt precautions  Home situation: Lives with family; assist from family   Baseline function:  retired ; history of SLP tx in  for SLP/cognitive -linguistic ; otherwise reportedly was IND in basic DL situations and participate with family in some adv DL.Did not drive. Will need to confirmation of history by family  Baseline Diet: regular  ST Dx:Cognitive-Linguistic Deficits   Initial Speech Therapy Assessment Diagnosis:   1. Cognitive Diagnosis: Testing revealed deficits in attention; working memory and STM; and numeric reasoning. Ongoing assess of complex PS/reasoning and tx trial for temporal orientation and memory incorporating use of memory strategies. Will need to get confirmed baseline function. Pt with history

## 2025-07-16 LAB
GLUCOSE BLD-MCNC: 171 MG/DL (ref 70–99)
GLUCOSE BLD-MCNC: 222 MG/DL (ref 70–99)
GLUCOSE BLD-MCNC: 222 MG/DL (ref 70–99)
GLUCOSE BLD-MCNC: 226 MG/DL (ref 70–99)
PERFORMED ON: ABNORMAL

## 2025-07-16 PROCEDURE — 97130 THER IVNTJ EA ADDL 15 MIN: CPT

## 2025-07-16 PROCEDURE — 97129 THER IVNTJ 1ST 15 MIN: CPT

## 2025-07-16 PROCEDURE — 97530 THERAPEUTIC ACTIVITIES: CPT

## 2025-07-16 PROCEDURE — 97110 THERAPEUTIC EXERCISES: CPT

## 2025-07-16 PROCEDURE — 6370000000 HC RX 637 (ALT 250 FOR IP): Performed by: PHYSICAL MEDICINE & REHABILITATION

## 2025-07-16 PROCEDURE — 97116 GAIT TRAINING THERAPY: CPT

## 2025-07-16 PROCEDURE — 97535 SELF CARE MNGMENT TRAINING: CPT

## 2025-07-16 PROCEDURE — 1280000000 HC REHAB R&B

## 2025-07-16 PROCEDURE — 6360000002 HC RX W HCPCS: Performed by: PHYSICAL MEDICINE & REHABILITATION

## 2025-07-16 RX ADMIN — ATORVASTATIN CALCIUM 40 MG: 40 TABLET, FILM COATED ORAL at 20:48

## 2025-07-16 RX ADMIN — GABAPENTIN 100 MG: 100 CAPSULE ORAL at 08:16

## 2025-07-16 RX ADMIN — PANTOPRAZOLE SODIUM 20 MG: 20 TABLET, DELAYED RELEASE ORAL at 20:48

## 2025-07-16 RX ADMIN — FLUOXETINE HYDROCHLORIDE 20 MG: 20 CAPSULE ORAL at 08:16

## 2025-07-16 RX ADMIN — METHYLPHENIDATE HYDROCHLORIDE 5 MG: 10 TABLET ORAL at 08:16

## 2025-07-16 RX ADMIN — INSULIN LISPRO 1 UNITS: 100 INJECTION, SOLUTION INTRAVENOUS; SUBCUTANEOUS at 11:46

## 2025-07-16 RX ADMIN — METHYLPHENIDATE HYDROCHLORIDE 5 MG: 10 TABLET ORAL at 15:33

## 2025-07-16 RX ADMIN — HEPARIN SODIUM 5000 UNITS: 5000 INJECTION INTRAVENOUS; SUBCUTANEOUS at 06:09

## 2025-07-16 RX ADMIN — ASPIRIN 81 MG 81 MG: 81 TABLET ORAL at 08:17

## 2025-07-16 RX ADMIN — ANTACID TABLETS 500 MG: 500 TABLET, CHEWABLE ORAL at 08:16

## 2025-07-16 RX ADMIN — PANTOPRAZOLE SODIUM 20 MG: 20 TABLET, DELAYED RELEASE ORAL at 08:17

## 2025-07-16 RX ADMIN — OXYCODONE HYDROCHLORIDE 10 MG: 10 TABLET ORAL at 22:28

## 2025-07-16 RX ADMIN — GABAPENTIN 100 MG: 100 CAPSULE ORAL at 15:33

## 2025-07-16 RX ADMIN — METFORMIN HYDROCHLORIDE 500 MG: 500 TABLET ORAL at 16:23

## 2025-07-16 RX ADMIN — OXYCODONE HYDROCHLORIDE 10 MG: 10 TABLET ORAL at 16:27

## 2025-07-16 RX ADMIN — Medication 9 MG: at 20:48

## 2025-07-16 RX ADMIN — GABAPENTIN 100 MG: 100 CAPSULE ORAL at 20:48

## 2025-07-16 RX ADMIN — PRAZOSIN HYDROCHLORIDE 1 MG: 1 CAPSULE ORAL at 20:48

## 2025-07-16 RX ADMIN — INSULIN LISPRO 1 UNITS: 100 INJECTION, SOLUTION INTRAVENOUS; SUBCUTANEOUS at 20:55

## 2025-07-16 RX ADMIN — ZOLPIDEM TARTRATE 5 MG: 5 TABLET, FILM COATED ORAL at 22:28

## 2025-07-16 RX ADMIN — METFORMIN HYDROCHLORIDE 500 MG: 500 TABLET ORAL at 08:17

## 2025-07-16 RX ADMIN — HEPARIN SODIUM 5000 UNITS: 5000 INJECTION INTRAVENOUS; SUBCUTANEOUS at 15:33

## 2025-07-16 RX ADMIN — INSULIN LISPRO 1 UNITS: 100 INJECTION, SOLUTION INTRAVENOUS; SUBCUTANEOUS at 16:23

## 2025-07-16 RX ADMIN — HEPARIN SODIUM 5000 UNITS: 5000 INJECTION INTRAVENOUS; SUBCUTANEOUS at 21:21

## 2025-07-16 ASSESSMENT — PAIN SCALES - WONG BAKER
WONGBAKER_NUMERICALRESPONSE: NO HURT
WONGBAKER_NUMERICALRESPONSE: NO HURT

## 2025-07-16 ASSESSMENT — PAIN SCALES - GENERAL
PAINLEVEL_OUTOF10: 8
PAINLEVEL_OUTOF10: 7
PAINLEVEL_OUTOF10: 8
PAINLEVEL_OUTOF10: 2
PAINLEVEL_OUTOF10: 3
PAINLEVEL_OUTOF10: 7

## 2025-07-16 ASSESSMENT — PAIN DESCRIPTION - ORIENTATION
ORIENTATION: LOWER
ORIENTATION: LOWER

## 2025-07-16 ASSESSMENT — PAIN DESCRIPTION - LOCATION
LOCATION: BACK
LOCATION: BACK;ABDOMEN
LOCATION: BACK

## 2025-07-16 ASSESSMENT — PAIN DESCRIPTION - DESCRIPTORS
DESCRIPTORS: SHARP;SORE
DESCRIPTORS: ACHING

## 2025-07-16 NOTE — PLAN OF CARE
Encourage patient to monitor pain and request assistance   Administer analgesics based on type and severity of pain and evaluate response   Implement non-pharmacological measures as appropriate and evaluate response   Assess pain using appropriate pain scale     Problem: Skin/Tissue Integrity  Goal: Skin integrity remains intact  Description: 1.  Monitor for areas of redness and/or skin breakdown  2.  Assess vascular access sites hourly  3.  Every 4-6 hours minimum:  Change oxygen saturation probe site  4.  Every 4-6 hours:  If on nasal continuous positive airway pressure, respiratory therapy assess nares and determine need for appliance change or resting period  7/16/2025 1118 by Jose Sun, RN  Outcome: Progressing  Flowsheets (Taken 7/16/2025 1118)  Skin Integrity Remains Intact:   Monitor for areas of redness and/or skin breakdown   Assess vascular access sites hourly   Positioning devices   Turn and reposition as indicated   Check visual cues for pain  7/16/2025 0104 by Donna Solitario RN  Outcome: Progressing     Problem: ABCDS Injury Assessment  Goal: Absence of physical injury  Outcome: Progressing  Flowsheets (Taken 7/16/2025 1118)  Absence of Physical Injury: Implement safety measures based on patient assessment     Problem: Cardiovascular - Adult  Goal: Maintains optimal cardiac output and hemodynamic stability  7/16/2025 0104 by Donna Solitario RN  Outcome: Progressing     Problem: Skin/Tissue Integrity - Adult  Goal: Skin integrity remains intact  Description: 1.  Monitor for areas of redness and/or skin breakdown  2.  Assess vascular access sites hourly  3.  Every 4-6 hours minimum:  Change oxygen saturation probe site  4.  Every 4-6 hours:  If on nasal continuous positive airway pressure, respiratory therapy assess nares and determine need for appliance change or resting period  7/16/2025 1118 by Jose Sun, RN  Outcome: Progressing  Flowsheets (Taken 7/16/2025 1118)  Skin Integrity Remains

## 2025-07-16 NOTE — PROGRESS NOTES
ACUTE REHAB UNIT  SPEECH/LANGUAGE PATHOLOGY      [x] Daily  [] Weekly Care Conference Note  [] Discharge    Patient:Elio Almodovar      :1955  MRN:[id number]  Rehab Dx/Hx: Normal pressure hydrocephalus (HCC) [G91.2]   CT head 2025: IMPRESSION: 1. Stable head CT.  CT Head 2025: IMPRESSION: 1.  Unchanged size and configuration of the ventricles with unchanged positioning of right  shunt catheter.2.  Trace layering intraventricular hemorrhage in the left temporal horn, new/increased from prior.3.  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  CTA Head Neck 2025: IMPRESSION:No flow significant stenosis within the head or neck.  XR Shunt series 2024: IMPRESSION:1. The orientation is reversed, findings will be discussed with referring physician. IMPRESSION:1. The orientation is reversed, findings will be discussed with referring physician  MRI Brain: 7/3/2025: IMPRESSION:Acute zone of infarction in the right anterior frontal lobe.Cardiomegaly, with ventriculostomy catheter in place.  No chest XR this admit encounter. Last chest XR on available chart review   Onset: 7/3/2025 Date of Admit: 2025  Room #: U6K-5817/3263-01    Precautions: falls, seizures, and post shunt precautions  Home situation: Lives with family; assist from family   Baseline function:  retired ; history of SLP tx in  for SLP/cognitive -linguistic ; otherwise reportedly was IND in basic DL situations and participate with family in some adv DL.Did not drive. Will need to confirmation of history by family  Baseline Diet: regular  ST Dx:Cognitive-Linguistic Deficits   Initial Speech Therapy Assessment Diagnosis:   1. Cognitive Diagnosis: Testing revealed deficits in attention; working memory and STM; and numeric reasoning. Ongoing assess of complex PS/reasoning and tx trial for temporal orientation and memory incorporating use of memory strategies. Will need to get confirmed baseline function. Pt with history

## 2025-07-16 NOTE — PROGRESS NOTES
Pt refused to get up to chair. Pt stated,\" I did not sleep well last night, and I want to be in the bed some more time.\" Pt is made aware of his PT time and informed pt that he needs to be up in the chair for his breakfast. Pt refused. Care ongoing.  Electronically signed by Donna Solitario RN on 7/16/2025 at 6:16 AM

## 2025-07-16 NOTE — PROGRESS NOTES
need to extend pending progress and family training).       Elida Tavarez MD 7/16/2025, 12:14 PM

## 2025-07-16 NOTE — PROGRESS NOTES
Physical Therapy  Facility/Department: [address] [facility]  Rehabilitation Physical Therapy Treatment Note    NAME: lEio Almodovar  : 1955 (70 y.o.)  MRN: [id number]  CODE STATUS: Full Code    Date of Service: 25       Restrictions:  Restrictions/Precautions: Fall Risk  Position Activity Restriction  Other Position/Activity Restrictions: Elevate HOB 30 degrees; activity as tolerated     SUBJECTIVE  Subjective: Pt met in w/c in room eating breakfast, reporting no complaints.  Pain: Denies pain.       OBJECTIVE  Cognition  Overall Cognitive Status: Exceptions  Arousal/Alertness: Appropriate responses to stimuli  Following Commands: Follows one step commands with repetition;Follows one step commands with increased time  Memory: Appears intact  Safety Judgement: Decreased awareness of need for assistance;Decreased awareness of need for safety  Problem Solving: Assistance required to identify errors made;Assistance required to generate solutions;Assistance required to implement solutions  Insights: Decreased awareness of deficits  Initiation: Requires cues for some  Cognition Comment: Delayed processing and distractible.  Orientation  Overall Orientation Status: Within Functional Limits    Functional Mobility  Transfers  Surface: Wheelchair  Additional Factors: Set-up;Verbal cues  Device: Walker (RW)  Sit to Stand  Assistance Level: Minimal assistance;Contact guard assist  Skilled Clinical Factors: min A to correct posterior lean progressing to CGA however max cues for sequencing required on all reps with poor carryover noted; PT assisted pt in donning shoes in sitting prior to transfers  Stand to Sit  Assistance Level: Minimal assistance;Contact guard assist  Skilled Clinical Factors: min A to correct posterior lean and decreased eccentric control progressing to CGA however max cues required for sequencing and technique on all reps with poor carryover noted      Environmental Mobility  Ambulation  Surface:

## 2025-07-16 NOTE — PROGRESS NOTES
Pt refusing to get up from bed all afternoon after therapy, states \"I can move myself around in bed.\" Denies being incontinent of urine. Pt sat up in bed for dinner on own after lowing hob and sliding up. Raised head of bed up to 45 degrees for dinner.

## 2025-07-16 NOTE — PROGRESS NOTES
Pt incontinent of bladder, does not call and denies when wet, educated pt that when he is wet that he needs to call so that he is not sitting in his urine which can lead to skin breakdown which pt currently has in pt gluteal slit MASD. Implementing q2hrs to restroom and blue wipes and zinc paste. When informed pt to call to use bathroom and to let us know when wet pt verbalized understanding.
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
## 2025-07-16 NOTE — PROGRESS NOTES
Occupational Therapy  Facility/Department: [address] [facility]  Rehabilitation Occupational Therapy Daily Am and PM Treatment Note    Date: 25  Patient Name: Elio Almodovar       Room: P6O-2560/3263-01  MRN: [id number]  Account: [id number]   : 1955  (70 y.o.) Gender: male           Additional Pertinent Hx: Per Dr. Tavarez H&P \"Patient is a 69 yo M with pmh HTN, DM2, prostate cancer, depression, h/o etoh abuse, and recently diagnosed normal pressure hydrocephalus who initially presented to the [facility] on [facility] for placement of right  shunt (with Dr. Diaz). Post op course was notable for acute ischemic stroke in the right frontal lobe along the approach of VPS catheter. Neurology consulted, stroke possibly from local arterial compression. Plan is to start ASA 81 mg 14 days post-op. Course was also complicated by depression with suicidal ideation. Telehealth Psychiatry consult was completed. Recommendation is for outpatient Psych follow-up.  Patient now presents to ARU with impaired mobility, self-care, and cognition below his baseline.\"        Past Medical History:  has a past medical history of Cancer (HCC), Diabetes mellitus (HCC), History of alcohol abuse, History of blood transfusion, History of pancreatitis, Hypertension, and NPH (normal pressure hydrocephalus) (HCC).  Past Surgical History:   has a past surgical history that includes Prostatectomy; Appendectomy; Tonsillectomy; and Ventriculoperitoneal shunt (Right, 2025).    Restrictions  Restrictions/Precautions: Fall Risk  Other Position/Activity Restrictions: Elevate HOB 30 degrees; activity as tolerated    Subjective  Subjective: Pt met in dept, reports achiness in lower lumbar region, does not want pain medication, agreeable to OT treat.  Restrictions/Precautions: Fall Risk             Objective     Cognition  Overall Cognitive Status: Exceptions  Arousal/Alertness: Appropriate responses to stimuli  Following Commands:

## 2025-07-16 NOTE — PROGRESS NOTES
Spiritual Health History and Assessment/Progress Note  [facility]    (P) Interdisciplinary rounds,  ,  ,      Name: Elio Almodovar MRN: [id number]    Age: 70 y.o.     Sex: male   Language: English   Hoahaoism: Spiritualist   Normal pressure hydrocephalus (HCC)     Date: 7/16/2025            Total Time Calculated: (P) 15 min              Spiritual Assessment continued in [facility]        Referral/Consult From: (P) Rounding   Encounter Overview/Reason: (P) Interdisciplinary rounds  Service Provided For: (P) Patient    Maame, Belief, Meaning:   Patient identifies as spiritual, is connected with a maame tradition or spiritual practice, and has beliefs or practices that help with coping during difficult times  Family/Friends No family/friends present      Importance and Influence:  Patient has spiritual/personal beliefs that influence decisions regarding their health  Family/Friends No family/friends present    Community:  Patient feels well-supported. Support system includes: Spouse/Partner, Children, and Extended family  Family/Friends No family/friends present    Assessment and Plan of Care:     Patient Interventions include: Facilitated expression of thoughts and feelings, Explored spiritual coping/struggle/distress, Engaged in theological reflection, and Affirmed coping skills/support systems  Family/Friends Interventions include: No family/friends present    Patient Plan of Care: Spiritual Care available upon further referral  Family/Friends Plan of Care: No family/friends present    Electronically signed by Chaplain Barrie on 7/16/2025 at 3:35 PM

## 2025-07-16 NOTE — PROGRESS NOTES
Patient admitted to rehab with normal pressure hydrocephalus.  A/Ox4. Transfers with walker x1. Mobility restrictions: WBAT. On regular safety tray diet, tolerating well. Medications taken whole with thins. On heparin for DVT prophylaxis.  Skin: MASD to gluteal slit, blue wipes, zinc to buttocks, q2hrs toileting, redness to heels with skin prep. Oxygen: RA. LDA: NONE. Has been continent/incontinent smear at times of bowel and incontinent of bladder. LBM 7/15. Chair/bed alarms in use and call light in reach. Will monitor for safety.

## 2025-07-16 NOTE — PROGRESS NOTES
Removed 20 staples total from pt 2 incisions located to pt head. Pt tolerated staple removal well. No complications.

## 2025-07-16 NOTE — PROGRESS NOTES
Pt continues to be incontinent of urine and does not call. Pt being toileted every two hours and offered in between. Educated again on importance of not sitting in urine as he has MASD in gluteal slit, blue silicone wipes and zinc paste being applied along with q2hrs toileting. Pt verbalized that he will call to use restroom but continues not to do so and sit in urine until staff come in room.

## 2025-07-16 NOTE — CARE COORDINATION
Team Conference held on Tuesday, 7-.  Team reviewed barriers:  posterior lean, easily distract able, decreased balance, overall weakness, incontinence, poor carryover.  Team is recommending continued stay on rehab to further his progress with anticipated DC date of Wednesday, 7- to home with home care.  Team recommends SN/PT/OT services.  Team reports pt has been vocalizing about suicide but quickly reports it is just his sense of humor.  Team is requesting Family Ed with wife on Friday, 7/18.    Attempted to reach wife today to give update.  Left HIPAA compliant message asking for returned call to discus DC planning.    GINGER Cruz     Case Management   [phone]    7/16/2025  2:44 PM

## 2025-07-17 LAB
ANION GAP SERPL CALCULATED.3IONS-SCNC: 12 MMOL/L (ref 3–16)
BASOPHILS # BLD: 0.1 K/UL (ref 0–0.2)
BASOPHILS NFR BLD: 1 %
BUN SERPL-MCNC: 14 MG/DL (ref 7–20)
CALCIUM SERPL-MCNC: 9.6 MG/DL (ref 8.3–10.6)
CHLORIDE SERPL-SCNC: 99 MMOL/L (ref 99–110)
CO2 SERPL-SCNC: 25 MMOL/L (ref 21–32)
CREAT SERPL-MCNC: 1.1 MG/DL (ref 0.8–1.3)
DEPRECATED RDW RBC AUTO: 14.2 % (ref 12.4–15.4)
EOSINOPHIL # BLD: 0.3 K/UL (ref 0–0.6)
EOSINOPHIL NFR BLD: 4 %
GFR SERPLBLD CREATININE-BSD FMLA CKD-EPI: 72 ML/MIN/{1.73_M2}
GLUCOSE BLD-MCNC: 156 MG/DL (ref 70–99)
GLUCOSE BLD-MCNC: 158 MG/DL (ref 70–99)
GLUCOSE BLD-MCNC: 177 MG/DL (ref 70–99)
GLUCOSE BLD-MCNC: 221 MG/DL (ref 70–99)
GLUCOSE SERPL-MCNC: 159 MG/DL (ref 70–99)
HCT VFR BLD AUTO: 37.2 % (ref 40.5–52.5)
HGB BLD-MCNC: 12.7 G/DL (ref 13.5–17.5)
LYMPHOCYTES # BLD: 2.7 K/UL (ref 1–5.1)
LYMPHOCYTES NFR BLD: 43 %
MCH RBC QN AUTO: 30.7 PG (ref 26–34)
MCHC RBC AUTO-ENTMCNC: 34.1 G/DL (ref 31–36)
MCV RBC AUTO: 90 FL (ref 80–100)
MONOCYTES # BLD: 0.6 K/UL (ref 0–1.3)
MONOCYTES NFR BLD: 9.8 %
NEUTROPHILS # BLD: 2.7 K/UL (ref 1.7–7.7)
NEUTROPHILS NFR BLD: 42.2 %
PERFORMED ON: ABNORMAL
PLATELET # BLD AUTO: 149 K/UL (ref 135–450)
PMV BLD AUTO: 9.2 FL (ref 5–10.5)
POTASSIUM SERPL-SCNC: 4.2 MMOL/L (ref 3.5–5.1)
RBC # BLD AUTO: 4.14 M/UL (ref 4.2–5.9)
SODIUM SERPL-SCNC: 136 MMOL/L (ref 136–145)
WBC # BLD AUTO: 6.3 K/UL (ref 4–11)

## 2025-07-17 PROCEDURE — 80048 BASIC METABOLIC PNL TOTAL CA: CPT

## 2025-07-17 PROCEDURE — 97535 SELF CARE MNGMENT TRAINING: CPT

## 2025-07-17 PROCEDURE — 1280000000 HC REHAB R&B

## 2025-07-17 PROCEDURE — 97129 THER IVNTJ 1ST 15 MIN: CPT

## 2025-07-17 PROCEDURE — 97130 THER IVNTJ EA ADDL 15 MIN: CPT

## 2025-07-17 PROCEDURE — 97530 THERAPEUTIC ACTIVITIES: CPT

## 2025-07-17 PROCEDURE — 97110 THERAPEUTIC EXERCISES: CPT

## 2025-07-17 PROCEDURE — 97112 NEUROMUSCULAR REEDUCATION: CPT

## 2025-07-17 PROCEDURE — 6370000000 HC RX 637 (ALT 250 FOR IP): Performed by: PHYSICAL MEDICINE & REHABILITATION

## 2025-07-17 PROCEDURE — 6360000002 HC RX W HCPCS: Performed by: PHYSICAL MEDICINE & REHABILITATION

## 2025-07-17 PROCEDURE — 36415 COLL VENOUS BLD VENIPUNCTURE: CPT

## 2025-07-17 PROCEDURE — 97116 GAIT TRAINING THERAPY: CPT

## 2025-07-17 PROCEDURE — 94760 N-INVAS EAR/PLS OXIMETRY 1: CPT

## 2025-07-17 PROCEDURE — 85025 COMPLETE CBC W/AUTO DIFF WBC: CPT

## 2025-07-17 RX ADMIN — HEPARIN SODIUM 5000 UNITS: 5000 INJECTION INTRAVENOUS; SUBCUTANEOUS at 06:08

## 2025-07-17 RX ADMIN — ATORVASTATIN CALCIUM 40 MG: 40 TABLET, FILM COATED ORAL at 21:07

## 2025-07-17 RX ADMIN — METOPROLOL SUCCINATE 25 MG: 25 TABLET, EXTENDED RELEASE ORAL at 11:49

## 2025-07-17 RX ADMIN — PANTOPRAZOLE SODIUM 20 MG: 20 TABLET, DELAYED RELEASE ORAL at 11:49

## 2025-07-17 RX ADMIN — OXYCODONE HYDROCHLORIDE 10 MG: 10 TABLET ORAL at 21:06

## 2025-07-17 RX ADMIN — METFORMIN HYDROCHLORIDE 500 MG: 500 TABLET ORAL at 16:41

## 2025-07-17 RX ADMIN — SENNOSIDES AND DOCUSATE SODIUM 1 TABLET: 8.6; 5 TABLET ORAL at 21:07

## 2025-07-17 RX ADMIN — METHYLPHENIDATE HYDROCHLORIDE 5 MG: 10 TABLET ORAL at 16:41

## 2025-07-17 RX ADMIN — FLUOXETINE HYDROCHLORIDE 20 MG: 20 CAPSULE ORAL at 11:49

## 2025-07-17 RX ADMIN — Medication 9 MG: at 21:06

## 2025-07-17 RX ADMIN — HEPARIN SODIUM 5000 UNITS: 5000 INJECTION INTRAVENOUS; SUBCUTANEOUS at 21:07

## 2025-07-17 RX ADMIN — ANTACID TABLETS 500 MG: 500 TABLET, CHEWABLE ORAL at 11:48

## 2025-07-17 RX ADMIN — HEPARIN SODIUM 5000 UNITS: 5000 INJECTION INTRAVENOUS; SUBCUTANEOUS at 15:32

## 2025-07-17 RX ADMIN — PANTOPRAZOLE SODIUM 20 MG: 20 TABLET, DELAYED RELEASE ORAL at 21:07

## 2025-07-17 RX ADMIN — PRAZOSIN HYDROCHLORIDE 1 MG: 1 CAPSULE ORAL at 21:06

## 2025-07-17 RX ADMIN — METFORMIN HYDROCHLORIDE 500 MG: 500 TABLET ORAL at 11:48

## 2025-07-17 RX ADMIN — METHYLPHENIDATE HYDROCHLORIDE 5 MG: 10 TABLET ORAL at 11:47

## 2025-07-17 RX ADMIN — SENNOSIDES AND DOCUSATE SODIUM 1 TABLET: 8.6; 5 TABLET ORAL at 11:49

## 2025-07-17 RX ADMIN — ASPIRIN 81 MG 81 MG: 81 TABLET ORAL at 11:49

## 2025-07-17 RX ADMIN — OXYCODONE HYDROCHLORIDE 10 MG: 10 TABLET ORAL at 15:33

## 2025-07-17 RX ADMIN — INSULIN LISPRO 1 UNITS: 100 INJECTION, SOLUTION INTRAVENOUS; SUBCUTANEOUS at 11:47

## 2025-07-17 RX ADMIN — GABAPENTIN 100 MG: 100 CAPSULE ORAL at 15:32

## 2025-07-17 RX ADMIN — GABAPENTIN 100 MG: 100 CAPSULE ORAL at 21:07

## 2025-07-17 RX ADMIN — GABAPENTIN 100 MG: 100 CAPSULE ORAL at 11:49

## 2025-07-17 RX ADMIN — ZOLPIDEM TARTRATE 5 MG: 5 TABLET, FILM COATED ORAL at 21:07

## 2025-07-17 ASSESSMENT — PAIN DESCRIPTION - ONSET
ONSET: ON-GOING
ONSET: ON-GOING

## 2025-07-17 ASSESSMENT — PAIN SCALES - GENERAL
PAINLEVEL_OUTOF10: 8
PAINLEVEL_OUTOF10: 0
PAINLEVEL_OUTOF10: 5
PAINLEVEL_OUTOF10: 8

## 2025-07-17 ASSESSMENT — PAIN DESCRIPTION - FREQUENCY
FREQUENCY: INTERMITTENT
FREQUENCY: INTERMITTENT

## 2025-07-17 ASSESSMENT — PAIN DESCRIPTION - PAIN TYPE
TYPE: ACUTE PAIN
TYPE: SURGICAL PAIN

## 2025-07-17 ASSESSMENT — PAIN DESCRIPTION - LOCATION
LOCATION: HEAD
LOCATION: BACK

## 2025-07-17 ASSESSMENT — PAIN DESCRIPTION - ORIENTATION
ORIENTATION: RIGHT
ORIENTATION: LOWER;MID

## 2025-07-17 ASSESSMENT — PAIN DESCRIPTION - DESCRIPTORS
DESCRIPTORS: ACHING
DESCRIPTORS: SHARP

## 2025-07-17 ASSESSMENT — PAIN SCALES - WONG BAKER
WONGBAKER_NUMERICALRESPONSE: NO HURT
WONGBAKER_NUMERICALRESPONSE: NO HURT

## 2025-07-17 NOTE — PLAN OF CARE
Problem: Chronic Conditions and Co-morbidities  Goal: Patient's chronic conditions and co-morbidity symptoms are monitored and maintained or improved  Outcome: Progressing  Flowsheets (Taken 7/15/2025 0939 by Jose Sun, RN)  Care Plan - Patient's Chronic Conditions and Co-Morbidity Symptoms are Monitored and Maintained or Improved: Monitor and assess patient's chronic conditions and comorbid symptoms for stability, deterioration, or improvement     Problem: Discharge Planning  Goal: Discharge to home or other facility with appropriate resources  Outcome: Progressing  Flowsheets (Taken 7/14/2025 0717 by Elida Garcia, RN)  Discharge to home or other facility with appropriate resources: Identify barriers to discharge with patient and caregiver     Problem: Safety - Adult  Goal: Free from fall injury  Outcome: Progressing  Flowsheets (Taken 7/16/2025 1118 by Jose Sun, RN)  Free From Fall Injury: Instruct family/caregiver on patient safety     Problem: Pain  Goal: Verbalizes/displays adequate comfort level or baseline comfort level  Outcome: Progressing  Flowsheets (Taken 7/16/2025 1118 by Jose Sun, RN)  Verbalizes/displays adequate comfort level or baseline comfort level:   Encourage patient to monitor pain and request assistance   Administer analgesics based on type and severity of pain and evaluate response   Assess pain using appropriate pain scale   Implement non-pharmacological measures as appropriate and evaluate response     Problem: Skin/Tissue Integrity  Goal: Skin integrity remains intact  Description: 1.  Monitor for areas of redness and/or skin breakdown  2.  Assess vascular access sites hourly  3.  Every 4-6 hours minimum:  Change oxygen saturation probe site  4.  Every 4-6 hours:  If on nasal continuous positive airway pressure, respiratory therapy assess nares and determine need for appliance change or resting period  Outcome: Progressing  Flowsheets (Taken 7/16/2025 1118 by

## 2025-07-17 NOTE — PROGRESS NOTES
Comprehensive Nutrition Assessment    Type and Reason for Visit:  Reassess    Nutrition Recommendations/Plan:   Continue 4 carb choice (60g/meal)  Continue Glucerna ONS qd (vanilla)  Start Greek yogurt ONS qd (breakfast)  Encourage and monitor po intake   Monitor weight status      Malnutrition Assessment:  Malnutrition Status:  At risk for malnutrition (07/17/25 1415)    Context:  Acute Illness     Findings of the 6 clinical characteristics of malnutrition:  Energy Intake:  Mild decrease in energy intake  Weight Loss:  1% to 2% over 1 week     Body Fat Loss:  No body fat loss     Muscle Mass Loss:  No muscle mass loss    Fluid Accumulation:  No fluid accumulation     Strength:  Not Performed    Nutrition Assessment:    Follow-up: patient appetite decreased since last assessement with <75% intake of meals for the past 3 days. Weight loss noted; patient lost 4# (1.9%) since admission. Patient repored drinking some of ONS. Continue Glucerna ONS qd (vanila). Start greek yogurt ONS to help increase po intake. Will continue to monitor.    Nutrition Related Findings:    (H). Last BM 7/15. Wound Type: Multiple, Surgical Incision       Current Nutrition Intake & Therapies:    Average Meal Intake: 51-75%  Average Supplements Intake: 26-50%  ADULT DIET; Regular; 4 carb choices (60 gm/meal); Safety Tray; Safety Tray (Disposables)  ADULT ORAL NUTRITION SUPPLEMENT; Dinner; Diabetic Oral Supplement    Anthropometric Measures:  Height: 172.7 cm (5' 7.99\")  Ideal Body Weight (IBW): 154 lbs (70 kg)       Current Body Weight: 89.5 kg (197 lb 5 oz), 128.1 % IBW. Weight Source: Bed scale  Current BMI (kg/m2): 30           Weight Adjustment For: No Adjustment                 BMI Categories: Obese Class 1 (BMI 30.0-34.9)    Estimated Daily Nutrient Needs:  Energy Requirements Based On: Kcal/kg  Weight Used for Energy Requirements: Current  Energy (kcal/day): 1604-52488 kcal (18-22 kcal/kg)  Weight Used for Protein Requirements:

## 2025-07-17 NOTE — PROGRESS NOTES
Occupational Therapy  Facility/Department: [address] RE[facility]  Rehabilitation Occupational Therapy Daily Treatment Note    Date: 25  Patient Name: Elio Almodovar       Room: V0U-0339/3263-01  MRN: [id number]  Account: [id number]   : 1955  (70 y.o.) Gender: male                    Past Medical History:  has a past medical history of Cancer (HCC), Diabetes mellitus (HCC), History of alcohol abuse, History of blood transfusion, History of pancreatitis, Hypertension, and NPH (normal pressure hydrocephalus) (HCC).  Past Surgical History:   has a past surgical history that includes Prostatectomy; Appendectomy; Tonsillectomy; and Ventriculoperitoneal shunt (Right, 2025).    Restrictions  Restrictions/Precautions: Fall Risk  Other Position/Activity Restrictions: Elevate HOB 30 degrees; activity as tolerated    Subjective  Subjective: met in therapy dept (was not marked on schedule for ADL); he is agreeable for shower  Restrictions/Precautions: Fall Risk             Objective     Cognition  Overall Cognitive Status: Exceptions  Arousal/Alertness: Appropriate responses to stimuli  Following Commands: Follows one step commands with repetition;Follows one step commands with increased time  Memory: Appears intact  Safety Judgement: Decreased awareness of need for assistance;Decreased awareness of need for safety  Problem Solving: Assistance required to identify errors made;Assistance required to generate solutions;Assistance required to implement solutions  Insights: Decreased awareness of deficits  Initiation: Requires cues for some  Cognition Comment: Delayed processing and distractible, slow motor planning  Orientation  Overall Orientation Status: Within Functional Limits         ADL  Feeding  Assistance Level: Set-up  Skilled Clinical Factors: on 4CC diet, wearing Kristopher system on L upper arm; slow motor planning to open containers  Grooming/Oral Hygiene  Assistance Level: Set-up  Skilled Clinical Factors:

## 2025-07-17 NOTE — PROGRESS NOTES
Patient admitted to rehab with hydrocephalus.  A/Ox4. Transfers with walker x1. Mobility restrictions: WBAT. On regular 4CC diet, tolerating well. Medications taken whole orally. On heparin for DVT prophylaxis.  Skin: surgical incision to head and abdomen which are open to air, blanchable redness on buttocks. Oxygen: room air. Has been incontinent of bowel and incontinent of bladder. LBM 07/15/25. Chair/bed alarms in use and call light in reach.  Electronically signed by Curtis Mann RN on 7/17/2025 at 11:15 AM

## 2025-07-17 NOTE — PLAN OF CARE
Problem: Chronic Conditions and Co-morbidities  Goal: Patient's chronic conditions and co-morbidity symptoms are monitored and maintained or improved  7/17/2025 1112 by Curtis Mann RN  Outcome: Progressing  Flowsheets (Taken 7/15/2025 0939 by Jose Sun, RN)  Care Plan - Patient's Chronic Conditions and Co-Morbidity Symptoms are Monitored and Maintained or Improved: Monitor and assess patient's chronic conditions and comorbid symptoms for stability, deterioration, or improvement  7/17/2025 0126 by Donna Solitario RN  Outcome: Progressing  Flowsheets (Taken 7/15/2025 0939 by Jose Sun, RN)  Care Plan - Patient's Chronic Conditions and Co-Morbidity Symptoms are Monitored and Maintained or Improved: Monitor and assess patient's chronic conditions and comorbid symptoms for stability, deterioration, or improvement     Problem: Discharge Planning  Goal: Discharge to home or other facility with appropriate resources  7/17/2025 1112 by Curtis Mann RN  Outcome: Progressing  Flowsheets (Taken 7/14/2025 0717 by Elida Garcia RN)  Discharge to home or other facility with appropriate resources: Identify barriers to discharge with patient and caregiver  7/17/2025 0126 by Donna Solitario RN  Outcome: Progressing  Flowsheets (Taken 7/14/2025 0717 by Elida Garcia RN)  Discharge to home or other facility with appropriate resources: Identify barriers to discharge with patient and caregiver     Problem: Safety - Adult  Goal: Free from fall injury  7/17/2025 1112 by Curtis Mann RN  Outcome: Progressing  Flowsheets (Taken 7/16/2025 1118 by Jose Sun, RN)  Free From Fall Injury: Instruct family/caregiver on patient safety  7/17/2025 0126 by Donna Solitario RN  Outcome: Progressing  Flowsheets (Taken 7/16/2025 1118 by Jose Sun, RN)  Free From Fall Injury: Instruct family/caregiver on patient safety     Problem: Pain  Goal: Verbalizes/displays adequate comfort level or baseline comfort

## 2025-07-17 NOTE — PROGRESS NOTES
PHYSICAL THERAPY  Progress Note   Second Session    Patient Name: Elio Almodovar  Medical Record Number: [id number]    Treatment Diagnosis: Decreased functional mobility; Decreased strength; Decreased balance; Decreased safety awareness      Chart Reviewed: Yes   Restrictions/Precautions: Fall Risk Other Position/Activity Restrictions: Elevate HOB 30 degrees; activity as tolerated   Additional Pertinent Hx: Pt is 70 y.o. male s/p RIGHT VENTRICULOPERITONEAL SHUNT PLACEMENT WITH LAPAROSCOPIC ASSIST 7/1. PMHx: prostate cancer, NPH, HTN. pancreatitis, diabetes         Subjective: Pt agreeable to activity.  Denies pain.     Objective    In // bars: A/P weight shift 4 x 10 with single hand support, SBA.  2 x 10 without UE support, Min A, cues to maximize anterior weight shift.  Fast march in place with (B) UE support, cues to maximize step speed and foot clearance, SBA.  Poor LLE clearance noted.  Mini-squat with single-hand support, 3 x 10, cues to maximize squat depth, CGA.  (B) heel raise 3 x 10, single-hand support, cues to maximize heel clearance.  Sit < > stand x 10, cues to go as fast as possible, 1 min. 52 s. To complete, cues to increase anterior shift and knee bend during sit.  Pt repeated exercise, completing sit < > Stand x 10 in 1 min. 40 s.  Pt repeated exercise once more, completing 10 sit < >  1 min. 29 s.  Pt practiced reaching to floor level, picking up bucket filled with bean bags, then replacing bucket on ground  x 5.  Pt required up to Min A, max cues to increase anterior weight shift, use both hands to grab bucket.  Standing tolerance limited d/t fatigue.  Seated: (B) UE forward reach x 20, cues to maximize anterior shift.     Assessment: Pt practiced LE strength, coordination, and speed training.  He struggled with fast-paced exercises, requiring repeated cueing and encouragement to increase his pace.  Standing tolerance limited by fatigue.  I recommend continued therapy to further improve

## 2025-07-17 NOTE — PROGRESS NOTES
Patient admitted to rehab with normal pressure hydrocephalus.  A/Ox4. Transfers with walker X1 & gait belt. Mobility restrictions: WBAT. On regular diet, tolerating well. Medications taken WWW. On rosalie hose, heparin for DVT prophylaxis.  Skin: scattered bruising, redness to bilateral heels,redness to gluteal slit. Oxygen: RA. LDA: none. Has been continent of bowel and incontinent of bladder. LBM 7/15. Chair/bed alarms in use and call light in reach. Will monitor for safety.   Electronically signed by Donna Solitario RN on 7/17/2025 at 1:49 AM

## 2025-07-17 NOTE — PROGRESS NOTES
Department of Physical Medicine & Rehabilitation  Progress Note    Patient Identification:  Elio Almodovar  [id number]  : 1955  Admit date: 2025    Chief Complaint: Normal pressure hydrocephalus (HCC)    Subjective:   No acute events overnight.   Patient seen this am sitting up in room. Reports difficulty with sleep. He has chronic insomnia and uses Ambien at home. We discussed risks/benefits of increasing Ambien and he would like to hold off for now.   Labs reviewed.     ROS: No f/c, n/v, cp     Objective:  Patient Vitals for the past 24 hrs:   BP Temp Temp src Pulse Resp SpO2 Weight   25 0831 139/66 97.5 °F (36.4 °C) Oral 61 18 98 % --   25 0808 -- -- -- -- -- 98 % --   25 0617 -- -- -- -- -- -- 89.5 kg (197 lb 5 oz)   25 2258 -- -- -- -- 18 -- --   25 2228 -- -- -- -- 20 -- --   25 2042 (!) 167/75 98.2 °F (36.8 °C) Oral 59 18 97 % --   25 1657 -- -- -- -- 18 -- --   25 1627 -- -- -- -- 18 -- --     Const: Alert. No distress, pleasant.   HEENT: Right side VPS sites well approximated with staples. Normal sclera/conjunctiva. MMM.   CV: Regular rate and rhythm.   Resp: No respiratory distress. Lungs CTAB.   Abd: Soft, nontender, nondistended, NABS+   Ext: No edema.   Neuro: Alert, oriented, some impaired recall   Psych: Cooperative, appropriate mood and affect    Laboratory data: Available via EMR.   Last 24 hour lab  Recent Results (from the past 24 hours)   POCT Glucose    Collection Time: 25 11:35 AM   Result Value Ref Range    POC Glucose 222 (H) 70 - 99 mg/dl    Performed on ACCU-CHEK    POCT Glucose    Collection Time: 25  4:06 PM   Result Value Ref Range    POC Glucose 222 (H) 70 - 99 mg/dl    Performed on ACCU-CHEK    POCT Glucose    Collection Time: 25  8:48 PM   Result Value Ref Range    POC Glucose 226 (H) 70 - 99 mg/dl    Performed on ACCU-CHEK    CBC with Auto Differential    Collection Time: 25  6:22 AM   Result Value

## 2025-07-17 NOTE — PROGRESS NOTES
OCCUPATIONAL THERAPY  Progress Note   Second Session    Patient Name: Elio Almodovar  Medical Record Number: [id number]    Treatment Diagnosis: Decreased functional mobility; Decreased strength; Decreased balance; Decreased safety awareness    General  Chart Reviewed: Yes, Orders, Progress Notes, History and Physical  Patient assessed for rehabilitation services?: Yes  Additional Pertinent Hx: Per Dr. Tavarez H&P \"Patient is a 69 yo M with pmh HTN, DM2, prostate cancer, depression, h/o etoh abuse, and recently diagnosed normal pressure hydrocephalus who initially presented to the [facility] on 7/1/2025 for placement of right  shunt (with Dr. Diaz). Post op course was notable for acute ischemic stroke in the right frontal lobe along the approach of VPS catheter. Neurology consulted, stroke possibly from local arterial compression. Plan is to start ASA 81 mg 14 days post-op. Course was also complicated by depression with suicidal ideation. Telehealth Psychiatry consult was completed. Recommendation is for outpatient Psych follow-up.  Patient now presents to ARU with impaired mobility, self-care, and cognition below his baseline.\"  Family / Caregiver Present: No  Referring Practitioner: Elida Tavarez MD  Diagnosis: Normal pressure hydrocephalus     Restrictions/Precautions  Restrictions/Precautions: Fall Risk        Position Activity Restriction  Other Position/Activity Restrictions: Elevate HOB 30 degrees; activity as tolerated    Pt met in dept, reports no pain, agreeable to OT treat.     Pt stands from WC > table SBA/CGA in stance for x2 bouts of 2 min 30 sec engaged in nuts and bolts activity to address standing balance and FM. Pt with decreased speed w/ L hand, had improved balance this date with SBA and periods of CGA for posterior lean, had R lateral lean with fatigue. Pt sits to chair SBA with min decreased eccentric control x2 times.     Pt was given x3 pill containers and prompted to

## 2025-07-17 NOTE — PROGRESS NOTES
ACUTE REHAB UNIT  SPEECH/LANGUAGE PATHOLOGY      [x] Daily  [] Weekly Care Conference Note  [] Discharge    Patient:Elio Almodovar      :1955  MRN:[id number]  Rehab Dx/Hx: Normal pressure hydrocephalus (HCC) [G91.2]   CT head 2025: IMPRESSION: 1. Stable head CT.  CT Head 2025: IMPRESSION: 1.  Unchanged size and configuration of the ventricles with unchanged positioning of right  shunt catheter.2.  Trace layering intraventricular hemorrhage in the left temporal horn, new/increased from prior.3.  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  CTA Head Neck 2025: IMPRESSION:No flow significant stenosis within the head or neck.  XR Shunt series 2024: IMPRESSION:1. The orientation is reversed, findings will be discussed with referring physician. IMPRESSION:1. The orientation is reversed, findings will be discussed with referring physician  MRI Brain: 7/3/2025: IMPRESSION:Acute zone of infarction in the right anterior frontal lobe.Cardiomegaly, with ventriculostomy catheter in place.  No chest XR this admit encounter. Last chest XR on available chart review   Onset: 7/3/2025 Date of Admit: 2025  Room #: S6C-5829/3263-01    Precautions: falls, seizures, and post shunt precautions  Home situation: Lives with family; assist from family   Baseline function:  retired ; history of SLP tx in  for SLP/cognitive -linguistic ; otherwise reportedly was IND in basic DL situations and participate with family in some adv DL.Did not drive. Will need to confirmation of history by family  Baseline Diet: regular  ST Dx:Cognitive-Linguistic Deficits   Initial Speech Therapy Assessment Diagnosis:   1. Cognitive Diagnosis: Testing revealed deficits in attention; working memory and STM; and numeric reasoning. Ongoing assess of complex PS/reasoning and tx trial for temporal orientation and memory incorporating use of memory strategies. Will need to get confirmed baseline function. Pt with history

## 2025-07-18 LAB
GLUCOSE BLD-MCNC: 138 MG/DL (ref 70–99)
GLUCOSE BLD-MCNC: 158 MG/DL (ref 70–99)
GLUCOSE BLD-MCNC: 190 MG/DL (ref 70–99)
GLUCOSE BLD-MCNC: 192 MG/DL (ref 70–99)
PERFORMED ON: ABNORMAL

## 2025-07-18 PROCEDURE — 1280000000 HC REHAB R&B

## 2025-07-18 PROCEDURE — 97530 THERAPEUTIC ACTIVITIES: CPT

## 2025-07-18 PROCEDURE — 97535 SELF CARE MNGMENT TRAINING: CPT

## 2025-07-18 PROCEDURE — 97130 THER IVNTJ EA ADDL 15 MIN: CPT

## 2025-07-18 PROCEDURE — 97110 THERAPEUTIC EXERCISES: CPT

## 2025-07-18 PROCEDURE — 97116 GAIT TRAINING THERAPY: CPT

## 2025-07-18 PROCEDURE — 6370000000 HC RX 637 (ALT 250 FOR IP): Performed by: PHYSICAL MEDICINE & REHABILITATION

## 2025-07-18 PROCEDURE — 94760 N-INVAS EAR/PLS OXIMETRY 1: CPT

## 2025-07-18 PROCEDURE — 97129 THER IVNTJ 1ST 15 MIN: CPT

## 2025-07-18 PROCEDURE — 6360000002 HC RX W HCPCS: Performed by: PHYSICAL MEDICINE & REHABILITATION

## 2025-07-18 PROCEDURE — 97112 NEUROMUSCULAR REEDUCATION: CPT

## 2025-07-18 RX ORDER — ZOLPIDEM TARTRATE 5 MG/1
10 TABLET ORAL NIGHTLY PRN
Status: DISCONTINUED | OUTPATIENT
Start: 2025-07-18 | End: 2025-07-22

## 2025-07-18 RX ADMIN — ASPIRIN 81 MG 81 MG: 81 TABLET ORAL at 07:21

## 2025-07-18 RX ADMIN — ZOLPIDEM TARTRATE 10 MG: 5 TABLET, FILM COATED ORAL at 20:59

## 2025-07-18 RX ADMIN — METHYLPHENIDATE HYDROCHLORIDE 5 MG: 10 TABLET ORAL at 14:26

## 2025-07-18 RX ADMIN — INSULIN LISPRO 1 UNITS: 100 INJECTION, SOLUTION INTRAVENOUS; SUBCUTANEOUS at 21:00

## 2025-07-18 RX ADMIN — GABAPENTIN 100 MG: 100 CAPSULE ORAL at 21:00

## 2025-07-18 RX ADMIN — FLUOXETINE HYDROCHLORIDE 20 MG: 20 CAPSULE ORAL at 07:20

## 2025-07-18 RX ADMIN — HEPARIN SODIUM 5000 UNITS: 5000 INJECTION INTRAVENOUS; SUBCUTANEOUS at 20:58

## 2025-07-18 RX ADMIN — OXYCODONE HYDROCHLORIDE 10 MG: 10 TABLET ORAL at 02:18

## 2025-07-18 RX ADMIN — HEPARIN SODIUM 5000 UNITS: 5000 INJECTION INTRAVENOUS; SUBCUTANEOUS at 14:26

## 2025-07-18 RX ADMIN — METFORMIN HYDROCHLORIDE 500 MG: 500 TABLET ORAL at 07:21

## 2025-07-18 RX ADMIN — METFORMIN HYDROCHLORIDE 500 MG: 500 TABLET ORAL at 16:27

## 2025-07-18 RX ADMIN — OXYCODONE HYDROCHLORIDE 10 MG: 10 TABLET ORAL at 20:59

## 2025-07-18 RX ADMIN — GABAPENTIN 100 MG: 100 CAPSULE ORAL at 07:21

## 2025-07-18 RX ADMIN — PANTOPRAZOLE SODIUM 20 MG: 20 TABLET, DELAYED RELEASE ORAL at 07:21

## 2025-07-18 RX ADMIN — HEPARIN SODIUM 5000 UNITS: 5000 INJECTION INTRAVENOUS; SUBCUTANEOUS at 06:14

## 2025-07-18 RX ADMIN — GABAPENTIN 100 MG: 100 CAPSULE ORAL at 14:26

## 2025-07-18 RX ADMIN — Medication 9 MG: at 21:00

## 2025-07-18 RX ADMIN — ANTACID TABLETS 500 MG: 500 TABLET, CHEWABLE ORAL at 07:20

## 2025-07-18 RX ADMIN — ATORVASTATIN CALCIUM 40 MG: 40 TABLET, FILM COATED ORAL at 20:59

## 2025-07-18 RX ADMIN — INSULIN LISPRO 1 UNITS: 100 INJECTION, SOLUTION INTRAVENOUS; SUBCUTANEOUS at 16:27

## 2025-07-18 RX ADMIN — SENNOSIDES AND DOCUSATE SODIUM 1 TABLET: 8.6; 5 TABLET ORAL at 20:59

## 2025-07-18 RX ADMIN — METHYLPHENIDATE HYDROCHLORIDE 5 MG: 10 TABLET ORAL at 07:21

## 2025-07-18 RX ADMIN — PANTOPRAZOLE SODIUM 20 MG: 20 TABLET, DELAYED RELEASE ORAL at 20:59

## 2025-07-18 RX ADMIN — PRAZOSIN HYDROCHLORIDE 1 MG: 1 CAPSULE ORAL at 21:01

## 2025-07-18 RX ADMIN — SENNOSIDES AND DOCUSATE SODIUM 1 TABLET: 8.6; 5 TABLET ORAL at 07:20

## 2025-07-18 RX ADMIN — POLYETHYLENE GLYCOL 3350 17 G: 17 POWDER, FOR SOLUTION ORAL at 12:31

## 2025-07-18 ASSESSMENT — PAIN SCALES - GENERAL
PAINLEVEL_OUTOF10: 5
PAINLEVEL_OUTOF10: 5
PAINLEVEL_OUTOF10: 8
PAINLEVEL_OUTOF10: 0
PAINLEVEL_OUTOF10: 8

## 2025-07-18 ASSESSMENT — PAIN DESCRIPTION - DESCRIPTORS
DESCRIPTORS: DULL
DESCRIPTORS: ACHING

## 2025-07-18 ASSESSMENT — PAIN DESCRIPTION - ORIENTATION
ORIENTATION: RIGHT;ANTERIOR
ORIENTATION: LOWER

## 2025-07-18 ASSESSMENT — PAIN DESCRIPTION - LOCATION
LOCATION: BACK
LOCATION: HEAD

## 2025-07-18 NOTE — PROGRESS NOTES
Occupational Therapy  Facility/Department: [address] REHAB  Rehabilitation Occupational Therapy Daily Treatment Note    Date: 25  Patient Name: Elio Almodovar       Room: C9W-8241/3263-01  MRN: [id number]  Account: [id number]   : 1955  (70 y.o.) Gender: male                    Past Medical History:  has a past medical history of Cancer (HCC), Diabetes mellitus (HCC), History of alcohol abuse, History of blood transfusion, History of pancreatitis, Hypertension, and NPH (normal pressure hydrocephalus) (HCC).  Past Surgical History:   has a past surgical history that includes Prostatectomy; Appendectomy; Tonsillectomy; and Ventriculoperitoneal shunt (Right, 2025).    Restrictions  Restrictions/Precautions: Fall Risk  Other Position/Activity Restrictions: Elevate HOB 30 degrees; activity as tolerated    Subjective  Subjective: met in therapy dept, he arrived via w/c, had better sleep last night, agreeable for OT tx  Restrictions/Precautions: Fall Risk             Objective     Cognition  Overall Cognitive Status: Exceptions  Arousal/Alertness: Appropriate responses to stimuli  Following Commands: Follows one step commands with repetition;Follows one step commands with increased time  Memory: Appears intact  Safety Judgement: Decreased awareness of need for assistance;Decreased awareness of need for safety  Problem Solving: Assistance required to identify errors made;Assistance required to generate solutions;Assistance required to implement solutions  Insights: Decreased awareness of deficits  Initiation: Requires cues for some  Cognition Comment: Delayed processing and distractible, slow motor planning  Orientation  Overall Orientation Status: Within Functional Limits         ADL             Functional Mobility  Device: Rolling walker  Activity: To/From bathroom  Assistance Level: Minimal assistance  Skilled Clinical Factors: ambulated into bathrm & approached toilet,  slow motor planning, freezing

## 2025-07-18 NOTE — PROGRESS NOTES
Patient admitted to rehab with hydrocephalus.  A/Ox4. Transfers with walker x1. Mobility restrictions: weight bearing as tolerated. On regular 4CC diet, tolerating well. Medications taken whole orally. On heparin for DVT prophylaxis.  Skin: surgical incision on scalp open to air, abdominal incisions with surgical glue, blanchable redness on buttocks and heels. Oxygen: on room air. Has been continent of bowel and incontinent of bladder. LBM 07/15/2025. Chair/bed alarms in use and call light in reach.   Electronically signed by Curtis Mann RN on 7/18/2025 at 11:25 AM

## 2025-07-18 NOTE — PLAN OF CARE
Problem: Chronic Conditions and Co-morbidities  Goal: Patient's chronic conditions and co-morbidity symptoms are monitored and maintained or improved  7/17/2025 2352 by Cierra Owens RN  Outcome: Progressing     Problem: Discharge Planning  Goal: Discharge to home or other facility with appropriate resources  7/17/2025 2352 by Cierra Owens RN  Outcome: Progressing     Problem: Safety - Adult  Goal: Free from fall injury  7/17/2025 2352 by Cierra Owens RN  Outcome: Progressing     Problem: Pain  Goal: Verbalizes/displays adequate comfort level or baseline comfort level  7/17/2025 2352 by Cierra Owens RN  Outcome: Progressing     Problem: Skin/Tissue Integrity  Goal: Skin integrity remains intact  Description: 1.  Monitor for areas of redness and/or skin breakdown  2.  Assess vascular access sites hourly  3.  Every 4-6 hours minimum:  Change oxygen saturation probe site  4.  Every 4-6 hours:  If on nasal continuous positive airway pressure, respiratory therapy assess nares and determine need for appliance change or resting period  7/17/2025 2352 by Cierra Owens RN  Outcome: Progressing     Problem: ABCDS Injury Assessment  Goal: Absence of physical injury  7/17/2025 2352 by Cierra Owens RN  Outcome: Progressing     Problem: Skin/Tissue Integrity - Adult  Goal: Skin integrity remains intact  Description: 1.  Monitor for areas of redness and/or skin breakdown  2.  Assess vascular access sites hourly  3.  Every 4-6 hours minimum:  Change oxygen saturation probe site  4.  Every 4-6 hours:  If on nasal continuous positive airway pressure, respiratory therapy assess nares and determine need for appliance change or resting period  7/17/2025 2352 by Cierra Owens RN  Outcome: Progressing     Problem: Gastrointestinal - Adult  Goal: Maintains or returns to baseline bowel function  7/17/2025 2352 by Cierra Owens RN  Outcome: Progressing     Problem: Infection - Adult  Goal: Absence of

## 2025-07-18 NOTE — PROGRESS NOTES
Pt admitted with normal pressure hydrocephalus s/p laparoscopic R  shunt placement on 7/1. AO x4. Able to transfer using the walker, WBAT. On heparin for DVT prophylaxis. On regular diet, tolerating well. Takes meds whole with thins. Pt is incontinent with bladder and continent with bowel. LBM is 7/15. Advised to call if in need of assistance. Call light within reach. Monitored accordingly.

## 2025-07-18 NOTE — PROGRESS NOTES
ACCU-CHEK    POCT Glucose    Collection Time: 07/18/25 11:33 AM   Result Value Ref Range    POC Glucose 158 (H) 70 - 99 mg/dl    Performed on ACCU-CHEK        Therapy progress:  Physical therapy:  Bed Mobility:  Overall Assistance Level: Contact Guard Assist  Sit>supine:  Assistance Level: Contact guard assist  Supine>sit:  Assistance Level: Contact guard assist  Transfers:  Surface: Wheelchair  Additional Factors: Set-up, Verbal cues  Device: Walker  Sit>stand:  Assistance Level: Stand by assist  Skilled Clinical Factors: No LOB this morning, no VCs required; Slow speed  Stand>sit:  Assistance Level: Stand by assist  Skilled Clinical Factors: No posterior LOB; No VCs required; Slow speed  Bed<>chair     Stand Pivot:  Assistance Level: Minimal assistance  Skilled Clinical Factors: RW support, cues to turn completely before sitting, Min A to correct intermittent posterior LOB.  Lateral transfer:     Car transfer:  Assistance Level: Contact guard assist  Skilled Clinical Factors: Cues for hand placement.  Ambulation:  Surface: Level surface  Device: Rolling walker  Distance: 180'  Activity: Within Unit  Activity Comments: Slow speed, decreased step length, no overt episode of freezing, increased time to complete, no physical assist required.  Additional Factors: Verbal cues, Increased time to complete  Assistance Level: Stand by assist  Gait Deviations: Decreased step length left, Decreased weight shift left, Slow moses, Narrow base of support  Skilled Clinical Factors: Minimal fatigue, but very slow speed.  Stairs:  Stair Height: 6''  Device: Bilateral handrails  Number of Stairs: 12  Additional Factors: Verbal cues, Increased time to complete, Non-reciprocal going down, Non-reciprocal going up  Assistance Level: Contact guard assist  Skilled Clinical Factors: Mild posterior lean during descent, but no overt LOB, CGA to ensure safety.  Curb:  Curb Height: 6''  Device: Rolling walker  Number of Curbs: 1  Additional

## 2025-07-18 NOTE — PROGRESS NOTES
Hospitalist Progress Note      PCP: Paul Delgadillo DO    Date of Admission: 6/16/2023    Chief Complaint: pain    Subjective: pt had breakfast, looks comfortable     Medications:  Reviewed    Infusion Medications   Scheduled Medications    insulin glargine  10 Units SubCUTAneous Daily    lisinopril  2.5 mg Oral Daily    enoxaparin  30 mg SubCUTAneous Daily    docusate sodium  100 mg Oral Daily    ceFAZolin  1,000 mg IntraVENous Q8H    insulin lispro  0-8 Units SubCUTAneous TID WC    insulin lispro  0-4 Units SubCUTAneous Nightly    clopidogrel  75 mg Oral Daily    gabapentin  300 mg Oral TID    metFORMIN  500 mg Oral BID WC    atorvastatin  10 mg Oral Daily     PRN Meds: oxyCODONE, oxyCODONE, calcium carbonate, zolpidem, ondansetron **OR** ondansetron      Intake/Output Summary (Last 24 hours) at 6/19/2023 0827  Last data filed at 6/19/2023 0559  Gross per 24 hour   Intake 970 ml   Output 650 ml   Net 320 ml       Exam:    /72   Pulse 67   Temp 98.1 °F (36.7 °C) (Oral)   Resp 18   Ht 5' 10\" (1.778 m)   Wt 192 lb (87.1 kg)   SpO2 96%   BMI 27.55 kg/m²     General appearance: No apparent distress, appears stated age and cooperative. HEENT: Pupils equal, round, and reactive to light. Conjunctivae/corneas clear. Neck: Supple, with full range of motion. No jugular venous distention. Trachea midline. Respiratory:  Normal respiratory effort. Clear to auscultation, bilaterally without Rales/Wheezes/Rhonchi. Cardiovascular: Regular rate and rhythm with normal S1/S2 without murmurs, rubs or gallops. Abdomen: Soft, non-tender, non-distended with normal bowel sounds. Musculoskeletal: R foot intact dressing   Skin: Skin color, texture, turgor normal.  No rashes or lesions. Neurologic:  Neurovascularly intact without any focal sensory/motor deficits.  Cranial nerves: II-XII intact, grossly non-focal.  Psychiatric: Alert and oriented, thought content appropriate, normal insight  Capillary Refill: Brisk,< PHYSICAL THERAPY  Progress Note   Second Session    Patient Name: Elio Almodovar  Medical Record Number: [id number]    Treatment Diagnosis: Decreased functional mobility; Decreased strength; Decreased balance; Decreased safety awareness      Chart Reviewed: Yes   Restrictions/Precautions: Fall Risk Other Position/Activity Restrictions: Elevate HOB 30 degrees; activity as tolerated   Additional Pertinent Hx: Pt is 70 y.o. male s/p RIGHT VENTRICULOPERITONEAL SHUNT PLACEMENT WITH LAPAROSCOPIC ASSIST 7/1. PMHx: prostate cancer, NPH, HTN. pancreatitis, diabetes         Subjective:  Pt denies pain.  Family arrived for family education at 12:45.     Objective    Pt able to complete 10 min. On Nustep, LEs only, level 1, cues to maximize speed (goal of at least 75 SPM).  Pt able to maintain speed goal through majority of trial.   Pt able to ascend/descend curb step with RW, CGA, verbal cues.  Pt able to ambulated 120' with RW, multiple turns, CGA-SBA, intermittent cues to increase speed and step length, no LOB.  Pt able to ambulate 30' with RW, SBA, then move supine < > sit, and complete L/R rolling on low mat with SBA, VCs.  He returned to , ambulating 30' with RW, SBA.      Assessment: Pt agreeable to activity, able to complete mobility tasks with RW, CGA-SBA.  Family present for session, and his wife feels comfortable assisting him at home.  I educated her that pt will require a lot of verbal cueing to initiate/stay on task, but his balance and strength have been steadily improving.   Recommend continued therapy to further improve strength, balance, endurance, safety awareness, and independence ambulating.       Safety Device - Type of devices:  [x]  All fall risk precautions in place [] Bed alarm in place  [] Call light within reach [] Chair alarm in place [] Positioning belt [x] Gait belt [] Patient at risk for falls [] Left in bed [] Left in chair [] Telesitter in use [] Sitter present [] Nurse notified []   3 seconds   Peripheral Pulses: +2 palpable, equal bilaterally       Labs:   Recent Labs     06/17/23  0537 06/18/23  0627   WBC 14.7* 9.5*   HGB 8.5* 9.2*   HCT 26.4* 28.4*    230     Recent Labs     06/17/23  0537 06/18/23  0628    141   K 4.3 3.8    104   CO2 23 27   BUN 19 17   CREATININE 1.19 1.16   CALCIUM 8.8 9.4     No results for input(s): AST, ALT, BILIDIR, BILITOT, ALKPHOS in the last 72 hours. No results for input(s): INR in the last 72 hours. No results for input(s): Evette Lather in the last 72 hours. Urinalysis:      Lab Results   Component Value Date/Time    NITRU Negative 05/18/2023 05:22 PM    BLOODU Negative 05/18/2023 05:22 PM    SPECGRAV 1.025 05/18/2023 05:22 PM    GLUCOSEU Negative 05/18/2023 05:22 PM       Radiology:  XR FOOT RIGHT (MIN 3 VIEWS)   Final Result   Postoperative right trans metatarsalectomy. Please see operative report for   additional details                 Assessment/Plan:    Active Hospital Problems    Diagnosis Date Noted    Amputation of right foot with complication, subsequent encounter [facility]) [S98.911D] 06/16/2023    Post-operative state [Z98.890] 06/16/2023    Acute hematogenous osteomyelitis of right foot ([address]) Jamal Lennon 06/16/2023    Diabetic foot infection ([address]) [S57.393, L08.9] 06/16/2023         DVT Prophylaxis: lovenox  Diet: ADULT DIET;  Regular; 4 carb choices (60 gm/meal)  Code Status: Full Code    PT/OT Eval Status: done  Dispo -   R foot cellulitis/OM- post TMT resection, atbs initiated, podiatry/ID on case, continue with pain management   HTN- restarted lisinopril, follow up clinically   DM/hyperglycemia- restared insulin, follow up with ACCU check  CKD/MEAGAN-follow up with BMP-remained stable    Medically stable for acute admission at [facility], will follow up along with primary service       Electronically signed by Delores Willis MD on 6/19/2023 at 8:27 AM

## 2025-07-18 NOTE — PROGRESS NOTES
ACUTE REHAB UNIT  SPEECH/LANGUAGE PATHOLOGY      [x] Daily  [] Weekly Care Conference Note  [] Discharge    Patient:Elio Almodovar      :1955  MRN:[id number]  Rehab Dx/Hx: Normal pressure hydrocephalus (HCC) [G91.2]   CT head 2025: IMPRESSION: 1. Stable head CT.  CT Head 2025: IMPRESSION: 1.  Unchanged size and configuration of the ventricles with unchanged positioning of right  shunt catheter.2.  Trace layering intraventricular hemorrhage in the left temporal horn, new/increased from prior.3.  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  CTA Head Neck 2025: IMPRESSION:No flow significant stenosis within the head or neck.  XR Shunt series 2024: IMPRESSION:1. The orientation is reversed, findings will be discussed with referring physician. IMPRESSION:1. The orientation is reversed, findings will be discussed with referring physician  MRI Brain: 7/3/2025: IMPRESSION:Acute zone of infarction in the right anterior frontal lobe.Cardiomegaly, with ventriculostomy catheter in place.  No chest XR this admit encounter. Last chest XR on available chart review   Onset: 7/3/2025 Date of Admit: 2025  Room #: I8D-5811/3263-01    Precautions: falls, seizures, and post shunt precautions  Home situation: Lives with family; assist from family   Baseline function:  retired ; history of SLP tx in  for SLP/cognitive -linguistic ; otherwise reportedly was IND in basic DL situations and participate with family in some adv DL.Did not drive. Will need to confirmation of history by family  Baseline Diet: regular  ST Dx:Cognitive-Linguistic Deficits   Initial Speech Therapy Assessment Diagnosis:   1. Cognitive Diagnosis: Testing revealed deficits in attention; working memory and STM; and numeric reasoning. Ongoing assess of complex PS/reasoning and tx trial for temporal orientation and memory incorporating use of memory strategies. Will need to get confirmed baseline function. Pt with history

## 2025-07-18 NOTE — PROGRESS NOTES
recall and demonstrated good hand placement/body positioning, no LOB. Pt amb w/ RW SBA > DRY shower transfer, slow shuffling gait with cues to inc step length and height, min carryover. Pt completes shower transfer per home set-up stepping over 4\" ledge with CGA. Pt sits/stands from shower chair with SBA. Slight safety concerns stepping over ledge, recommend pt wait for HHOT to assist with first shower transfer and they are in agreement. Pt amb back to WC, sits to chair with SBA, improved eccentric control this date.     Pt in stance with clothespins on shorts retrieved x8 with no LOB, SBA for balance, stabilized at table.     Showed wife RW basket and gave handout for RW basket/sock aide.     Discussed with pt's wife he will require verbal cueing to initiate/stay on task during amb/transfers and ADL, will need assist for meal prep and medication management. She was able to cue pt appropriately during session and demonstrated understanding of what OT was educating on.      Safety Device - Type of devices: Left in transport line to be taken back to room    []  All fall risk precautions in place [] Bed alarm in place  [] Call light within reach [] Chair alarm in place [] Positioning belt [x] Gait belt [] Patient at risk for falls [] Left in bed [x] Left in chair [] Telesitter in use [] Sitter present [] Nurse notified []  None      Therapy Time   Individual Co-treatment   Time In 1315     Time Out 1400     Minutes 45       Electronically signed by Tasha Tidwell OT on 7/18/2025 at 4:22 PM

## 2025-07-18 NOTE — PROGRESS NOTES
Physical Therapy  Facility/Department: [address] REHAB  Rehabilitation Physical Therapy Treatment Note    NAME: Elio Almodovar  : 1955 (70 y.o.)  MRN: [id number]  CODE STATUS: Full Code    Date of Service: 25       Restrictions:  Restrictions/Precautions: Fall Risk  Position Activity Restriction  Other Position/Activity Restrictions: Elevate HOB 30 degrees; activity as tolerated     SUBJECTIVE  Subjective: Pt agreeable to activity.   Family coming in for afternoon education session.       OBJECTIVE  Cognition  Overall Cognitive Status: Exceptions  Arousal/Alertness: Delayed responses to stimuli;Inconsistent responses to stimuli  Following Commands: Follows one step commands with repetition;Follows one step commands with increased time  Memory: Appears intact  Safety Judgement: Decreased awareness of need for assistance;Decreased awareness of need for safety  Problem Solving: Assistance required to identify errors made;Assistance required to generate solutions;Assistance required to implement solutions  Insights: Decreased awareness of deficits  Initiation: Requires cues for some  Cognition Comment: Delayed processing and distractible, slow motor planning  Orientation  Overall Orientation Status: Within Functional Limits  Orientation Level: Oriented to person;Oriented to place    Functional Mobility  Transfers  Surface: Wheelchair  Additional Factors: Set-up;Verbal cues  Device: Walker  Sit to Stand  Assistance Level: Stand by assist  Stand to Sit  Assistance Level: Stand by assist      Environmental Mobility  Ambulation  Surface: Level surface  Device: Rolling walker  Distance: 180'  Activity: Within Unit  Activity Comments: Slow speed, decreased step length, no overt episode of freezing, increased time to complete, no physical assist required.  Additional Factors: Verbal cues;Increased time to complete  Assistance Level: Stand by assist  Gait Deviations: Decreased step length left;Decreased weight

## 2025-07-19 LAB
GLUCOSE BLD-MCNC: 137 MG/DL (ref 70–99)
GLUCOSE BLD-MCNC: 166 MG/DL (ref 70–99)
GLUCOSE BLD-MCNC: 207 MG/DL (ref 70–99)
GLUCOSE BLD-MCNC: 216 MG/DL (ref 70–99)
PERFORMED ON: ABNORMAL

## 2025-07-19 PROCEDURE — 6370000000 HC RX 637 (ALT 250 FOR IP): Performed by: PHYSICAL MEDICINE & REHABILITATION

## 2025-07-19 PROCEDURE — 94760 N-INVAS EAR/PLS OXIMETRY 1: CPT

## 2025-07-19 PROCEDURE — 1280000000 HC REHAB R&B

## 2025-07-19 PROCEDURE — 6360000002 HC RX W HCPCS: Performed by: PHYSICAL MEDICINE & REHABILITATION

## 2025-07-19 RX ADMIN — METFORMIN HYDROCHLORIDE 500 MG: 500 TABLET ORAL at 16:36

## 2025-07-19 RX ADMIN — METHYLPHENIDATE HYDROCHLORIDE 5 MG: 10 TABLET ORAL at 08:48

## 2025-07-19 RX ADMIN — SENNOSIDES AND DOCUSATE SODIUM 1 TABLET: 8.6; 5 TABLET ORAL at 08:46

## 2025-07-19 RX ADMIN — ATORVASTATIN CALCIUM 40 MG: 40 TABLET, FILM COATED ORAL at 21:26

## 2025-07-19 RX ADMIN — ZOLPIDEM TARTRATE 10 MG: 5 TABLET, FILM COATED ORAL at 21:35

## 2025-07-19 RX ADMIN — INSULIN LISPRO 1 UNITS: 100 INJECTION, SOLUTION INTRAVENOUS; SUBCUTANEOUS at 21:30

## 2025-07-19 RX ADMIN — ANTACID TABLETS 500 MG: 500 TABLET, CHEWABLE ORAL at 08:46

## 2025-07-19 RX ADMIN — HEPARIN SODIUM 5000 UNITS: 5000 INJECTION INTRAVENOUS; SUBCUTANEOUS at 05:25

## 2025-07-19 RX ADMIN — PANTOPRAZOLE SODIUM 20 MG: 20 TABLET, DELAYED RELEASE ORAL at 21:27

## 2025-07-19 RX ADMIN — GABAPENTIN 100 MG: 100 CAPSULE ORAL at 08:47

## 2025-07-19 RX ADMIN — METHYLPHENIDATE HYDROCHLORIDE 5 MG: 10 TABLET ORAL at 14:38

## 2025-07-19 RX ADMIN — ASPIRIN 81 MG 81 MG: 81 TABLET ORAL at 08:47

## 2025-07-19 RX ADMIN — PANTOPRAZOLE SODIUM 20 MG: 20 TABLET, DELAYED RELEASE ORAL at 08:46

## 2025-07-19 RX ADMIN — HEPARIN SODIUM 5000 UNITS: 5000 INJECTION INTRAVENOUS; SUBCUTANEOUS at 14:39

## 2025-07-19 RX ADMIN — GABAPENTIN 100 MG: 100 CAPSULE ORAL at 21:26

## 2025-07-19 RX ADMIN — PRAZOSIN HYDROCHLORIDE 1 MG: 1 CAPSULE ORAL at 21:35

## 2025-07-19 RX ADMIN — FLUOXETINE HYDROCHLORIDE 20 MG: 20 CAPSULE ORAL at 08:47

## 2025-07-19 RX ADMIN — METFORMIN HYDROCHLORIDE 500 MG: 500 TABLET ORAL at 08:47

## 2025-07-19 RX ADMIN — HEPARIN SODIUM 5000 UNITS: 5000 INJECTION INTRAVENOUS; SUBCUTANEOUS at 21:24

## 2025-07-19 RX ADMIN — GABAPENTIN 100 MG: 100 CAPSULE ORAL at 14:37

## 2025-07-19 RX ADMIN — INSULIN LISPRO 1 UNITS: 100 INJECTION, SOLUTION INTRAVENOUS; SUBCUTANEOUS at 12:00

## 2025-07-19 RX ADMIN — OXYCODONE HYDROCHLORIDE 10 MG: 10 TABLET ORAL at 08:47

## 2025-07-19 RX ADMIN — OXYCODONE HYDROCHLORIDE 10 MG: 10 TABLET ORAL at 21:25

## 2025-07-19 RX ADMIN — ACETAMINOPHEN 650 MG: 325 TABLET ORAL at 23:30

## 2025-07-19 ASSESSMENT — PAIN DESCRIPTION - PAIN TYPE
TYPE: SURGICAL PAIN
TYPE: SURGICAL PAIN

## 2025-07-19 ASSESSMENT — PAIN DESCRIPTION - ONSET: ONSET: ON-GOING

## 2025-07-19 ASSESSMENT — PAIN SCALES - GENERAL
PAINLEVEL_OUTOF10: 0
PAINLEVEL_OUTOF10: 8

## 2025-07-19 ASSESSMENT — PAIN - FUNCTIONAL ASSESSMENT
PAIN_FUNCTIONAL_ASSESSMENT: ACTIVITIES ARE NOT PREVENTED
PAIN_FUNCTIONAL_ASSESSMENT: PREVENTS OR INTERFERES WITH MANY ACTIVE NOT PASSIVE ACTIVITIES
PAIN_FUNCTIONAL_ASSESSMENT: ACTIVITIES ARE NOT PREVENTED
PAIN_FUNCTIONAL_ASSESSMENT: ACTIVITIES ARE NOT PREVENTED

## 2025-07-19 ASSESSMENT — PAIN DESCRIPTION - ORIENTATION
ORIENTATION: UPPER
ORIENTATION: LOWER
ORIENTATION: UPPER
ORIENTATION: UPPER

## 2025-07-19 ASSESSMENT — PAIN DESCRIPTION - DESCRIPTORS
DESCRIPTORS: SHARP
DESCRIPTORS: ACHING
DESCRIPTORS: ACHING
DESCRIPTORS: SHARP

## 2025-07-19 ASSESSMENT — PAIN DESCRIPTION - FREQUENCY
FREQUENCY: INTERMITTENT
FREQUENCY: INTERMITTENT

## 2025-07-19 ASSESSMENT — PAIN SCALES - WONG BAKER: WONGBAKER_NUMERICALRESPONSE: NO HURT

## 2025-07-19 ASSESSMENT — PAIN DESCRIPTION - LOCATION
LOCATION: BACK
LOCATION: HEAD

## 2025-07-19 NOTE — PLAN OF CARE
Problem: Safety - Adult  Goal: Free from fall injury  Outcome: Progressing     Problem: Pain  Goal: Verbalizes/displays adequate comfort level or baseline comfort level  Outcome: Progressing     Problem: Skin/Tissue Integrity  Goal: Skin integrity remains intact  Description: 1.  Monitor for areas of redness and/or skin breakdown  2.  Assess vascular access sites hourly  3.  Every 4-6 hours minimum:  Change oxygen saturation probe site  4.  Every 4-6 hours:  If on nasal continuous positive airway pressure, respiratory therapy assess nares and determine need for appliance change or resting period  Outcome: Progressing     Problem: ABCDS Injury Assessment  Goal: Absence of physical injury  Outcome: Progressing     Problem: Skin/Tissue Integrity - Adult  Goal: Skin integrity remains intact  Description: 1.  Monitor for areas of redness and/or skin breakdown  2.  Assess vascular access sites hourly  3.  Every 4-6 hours minimum:  Change oxygen saturation probe site  4.  Every 4-6 hours:  If on nasal continuous positive airway pressure, respiratory therapy assess nares and determine need for appliance change or resting period  Outcome: Progressing

## 2025-07-19 NOTE — PROGRESS NOTES
Pt refused to get up to chair this morning for breakfast, wanted to sleep in longer, no therapy today. Will continue to encourage pt to get up to chair.

## 2025-07-19 NOTE — PROGRESS NOTES
Pt admitted with normal pressure hydrocephalus s/p laparoscopic R  shunt placement on 7/1. AO x4. Able to transfer using the walker, WBAT. On heparin for DVT prophylaxis. On regular diet, tolerating well. Takes meds whole with thins. Pt is incontinent with bladder and continent with bowel. LBM is 7/18. Advised to call if in need of assistance. Call light within reach. Monitored accordingly

## 2025-07-20 LAB
GLUCOSE BLD-MCNC: 140 MG/DL (ref 70–99)
GLUCOSE BLD-MCNC: 157 MG/DL (ref 70–99)
GLUCOSE BLD-MCNC: 181 MG/DL (ref 70–99)
GLUCOSE BLD-MCNC: 185 MG/DL (ref 70–99)
PERFORMED ON: ABNORMAL

## 2025-07-20 PROCEDURE — 94760 N-INVAS EAR/PLS OXIMETRY 1: CPT

## 2025-07-20 PROCEDURE — 6360000002 HC RX W HCPCS: Performed by: PHYSICAL MEDICINE & REHABILITATION

## 2025-07-20 PROCEDURE — 1280000000 HC REHAB R&B

## 2025-07-20 PROCEDURE — 6370000000 HC RX 637 (ALT 250 FOR IP): Performed by: PHYSICAL MEDICINE & REHABILITATION

## 2025-07-20 RX ADMIN — GABAPENTIN 100 MG: 100 CAPSULE ORAL at 13:20

## 2025-07-20 RX ADMIN — INSULIN LISPRO 1 UNITS: 100 INJECTION, SOLUTION INTRAVENOUS; SUBCUTANEOUS at 12:00

## 2025-07-20 RX ADMIN — HEPARIN SODIUM 5000 UNITS: 5000 INJECTION INTRAVENOUS; SUBCUTANEOUS at 13:20

## 2025-07-20 RX ADMIN — GABAPENTIN 100 MG: 100 CAPSULE ORAL at 20:51

## 2025-07-20 RX ADMIN — PANTOPRAZOLE SODIUM 20 MG: 20 TABLET, DELAYED RELEASE ORAL at 20:52

## 2025-07-20 RX ADMIN — GABAPENTIN 100 MG: 100 CAPSULE ORAL at 08:34

## 2025-07-20 RX ADMIN — ANTACID TABLETS 500 MG: 500 TABLET, CHEWABLE ORAL at 08:34

## 2025-07-20 RX ADMIN — ZOLPIDEM TARTRATE 10 MG: 5 TABLET, FILM COATED ORAL at 20:51

## 2025-07-20 RX ADMIN — ASPIRIN 81 MG 81 MG: 81 TABLET ORAL at 08:34

## 2025-07-20 RX ADMIN — METHYLPHENIDATE HYDROCHLORIDE 5 MG: 10 TABLET ORAL at 13:20

## 2025-07-20 RX ADMIN — METHYLPHENIDATE HYDROCHLORIDE 5 MG: 10 TABLET ORAL at 08:36

## 2025-07-20 RX ADMIN — PANTOPRAZOLE SODIUM 20 MG: 20 TABLET, DELAYED RELEASE ORAL at 08:34

## 2025-07-20 RX ADMIN — PRAZOSIN HYDROCHLORIDE 1 MG: 1 CAPSULE ORAL at 20:51

## 2025-07-20 RX ADMIN — OXYCODONE 5 MG: 5 TABLET ORAL at 13:49

## 2025-07-20 RX ADMIN — FLUOXETINE HYDROCHLORIDE 20 MG: 20 CAPSULE ORAL at 08:34

## 2025-07-20 RX ADMIN — HEPARIN SODIUM 5000 UNITS: 5000 INJECTION INTRAVENOUS; SUBCUTANEOUS at 21:51

## 2025-07-20 RX ADMIN — ATORVASTATIN CALCIUM 40 MG: 40 TABLET, FILM COATED ORAL at 20:51

## 2025-07-20 RX ADMIN — METFORMIN HYDROCHLORIDE 500 MG: 500 TABLET ORAL at 08:34

## 2025-07-20 RX ADMIN — SENNOSIDES AND DOCUSATE SODIUM 1 TABLET: 8.6; 5 TABLET ORAL at 08:34

## 2025-07-20 RX ADMIN — METFORMIN HYDROCHLORIDE 500 MG: 500 TABLET ORAL at 18:03

## 2025-07-20 RX ADMIN — OXYCODONE HYDROCHLORIDE 10 MG: 10 TABLET ORAL at 05:59

## 2025-07-20 RX ADMIN — HEPARIN SODIUM 5000 UNITS: 5000 INJECTION INTRAVENOUS; SUBCUTANEOUS at 05:33

## 2025-07-20 RX ADMIN — INSULIN LISPRO 1 UNITS: 100 INJECTION, SOLUTION INTRAVENOUS; SUBCUTANEOUS at 18:02

## 2025-07-20 ASSESSMENT — PAIN DESCRIPTION - FREQUENCY: FREQUENCY: INTERMITTENT

## 2025-07-20 ASSESSMENT — PAIN DESCRIPTION - ORIENTATION
ORIENTATION: LOWER
ORIENTATION: RIGHT

## 2025-07-20 ASSESSMENT — PAIN SCALES - GENERAL
PAINLEVEL_OUTOF10: 6
PAINLEVEL_OUTOF10: 0
PAINLEVEL_OUTOF10: 0
PAINLEVEL_OUTOF10: 8
PAINLEVEL_OUTOF10: 0

## 2025-07-20 ASSESSMENT — PAIN DESCRIPTION - LOCATION
LOCATION: BACK
LOCATION: HEAD

## 2025-07-20 ASSESSMENT — PAIN DESCRIPTION - PAIN TYPE: TYPE: CHRONIC PAIN

## 2025-07-20 ASSESSMENT — PAIN SCALES - WONG BAKER
WONGBAKER_NUMERICALRESPONSE: NO HURT
WONGBAKER_NUMERICALRESPONSE: NO HURT

## 2025-07-20 ASSESSMENT — PAIN DESCRIPTION - DESCRIPTORS
DESCRIPTORS: ACHING
DESCRIPTORS: ACHING

## 2025-07-20 ASSESSMENT — PAIN - FUNCTIONAL ASSESSMENT
PAIN_FUNCTIONAL_ASSESSMENT: PREVENTS OR INTERFERES SOME ACTIVE ACTIVITIES AND ADLS
PAIN_FUNCTIONAL_ASSESSMENT: PREVENTS OR INTERFERES WITH MANY ACTIVE NOT PASSIVE ACTIVITIES

## 2025-07-20 ASSESSMENT — PAIN DESCRIPTION - ONSET: ONSET: ON-GOING

## 2025-07-20 NOTE — PROGRESS NOTES
Patient admitted to rehab with normal hydrocephalus.  A/Ox4. Transfers with walker X1 & gait belt. Mobility restrictions: WBAT. On regular diet, tolerating well. Medications taken WWW. On heaprin for DVT prophylaxis.  Skin: surgical incision on the top of head, & abd. Redness to buttocks. Oxygen: RA. LDA: none. Has been continent of bowel and incontinent of bladder. LBM 7/19/25. Chair/bed alarms in use and call light in reach. Will monitor for safety. Electronically signed by Donna Solitario RN on 7/19/2025 at 10:48 PM

## 2025-07-20 NOTE — PLAN OF CARE
Problem: Chronic Conditions and Co-morbidities  Goal: Patient's chronic conditions and co-morbidity symptoms are monitored and maintained or improved  7/19/2025 2235 by Donna Solitario RN  Outcome: Progressing     Problem: Discharge Planning  Goal: Discharge to home or other facility with appropriate resources  7/19/2025 2235 by Dnona Solitario RN  Outcome: Progressing     Problem: Safety - Adult  Goal: Free from fall injury  7/19/2025 2235 by Donna Solitario RN  Outcome: Progressing     Problem: Pain  Goal: Verbalizes/displays adequate comfort level or baseline comfort level  7/19/2025 2235 by Donna Solitario RN  Outcome: Progressing     Problem: Skin/Tissue Integrity  Goal: Skin integrity remains intact  Description: 1.  Monitor for areas of redness and/or skin breakdown  2.  Assess vascular access sites hourly  3.  Every 4-6 hours minimum:  Change oxygen saturation probe site  4.  Every 4-6 hours:  If on nasal continuous positive airway pressure, respiratory therapy assess nares and determine need for appliance change or resting period  7/19/2025 2235 by Donna Solitario RN  Outcome: Progressing  Flowsheets  Taken 7/19/2025 2046 by Donna Solitario RN  Skin Integrity Remains Intact: Monitor for areas of redness and/or skin breakdown  Taken 7/19/2025 1503 by Bernadette Armas RN  Skin Integrity Remains Intact: Monitor for areas of redness and/or skin breakdown     Problem: ABCDS Injury Assessment  Goal: Absence of physical injury  7/19/2025 2235 by Donna Solitario RN  Outcome: Progressing     Problem: Nutrition Deficit:  Goal: Optimize nutritional status  7/19/2025 2235 by Donna Solitario RN  Outcome: Progressing     Problem: Skin/Tissue Integrity - Adult  Goal: Skin integrity remains intact  Description: 1.  Monitor for areas of redness and/or skin breakdown  2.  Assess vascular access sites hourly  3.  Every 4-6 hours minimum:  Change oxygen saturation probe site  4.  Every 4-6 hours:  If on nasal continuous positive airway

## 2025-07-20 NOTE — PLAN OF CARE
Problem: Chronic Conditions and Co-morbidities  Goal: Patient's chronic conditions and co-morbidity symptoms are monitored and maintained or improved  7/20/2025 1232 by Bernadette Armas RN  Outcome: Progressing

## 2025-07-21 LAB
ANION GAP SERPL CALCULATED.3IONS-SCNC: 12 MMOL/L (ref 3–16)
BASOPHILS # BLD: 0.1 K/UL (ref 0–0.2)
BASOPHILS NFR BLD: 0.8 %
BUN SERPL-MCNC: 16 MG/DL (ref 7–20)
CALCIUM SERPL-MCNC: 9.4 MG/DL (ref 8.3–10.6)
CHLORIDE SERPL-SCNC: 101 MMOL/L (ref 99–110)
CO2 SERPL-SCNC: 24 MMOL/L (ref 21–32)
CREAT SERPL-MCNC: 1 MG/DL (ref 0.8–1.3)
DEPRECATED RDW RBC AUTO: 14.4 % (ref 12.4–15.4)
EOSINOPHIL # BLD: 0.3 K/UL (ref 0–0.6)
EOSINOPHIL NFR BLD: 3.7 %
GFR SERPLBLD CREATININE-BSD FMLA CKD-EPI: 80 ML/MIN/{1.73_M2}
GLUCOSE BLD-MCNC: 159 MG/DL (ref 70–99)
GLUCOSE BLD-MCNC: 166 MG/DL (ref 70–99)
GLUCOSE BLD-MCNC: 200 MG/DL (ref 70–99)
GLUCOSE BLD-MCNC: 225 MG/DL (ref 70–99)
GLUCOSE SERPL-MCNC: 174 MG/DL (ref 70–99)
HCT VFR BLD AUTO: 34.8 % (ref 40.5–52.5)
HGB BLD-MCNC: 12.1 G/DL (ref 13.5–17.5)
LYMPHOCYTES # BLD: 2 K/UL (ref 1–5.1)
LYMPHOCYTES NFR BLD: 27.7 %
MCH RBC QN AUTO: 31.3 PG (ref 26–34)
MCHC RBC AUTO-ENTMCNC: 34.8 G/DL (ref 31–36)
MCV RBC AUTO: 89.9 FL (ref 80–100)
MONOCYTES # BLD: 0.7 K/UL (ref 0–1.3)
MONOCYTES NFR BLD: 9.9 %
NEUTROPHILS # BLD: 4.2 K/UL (ref 1.7–7.7)
NEUTROPHILS NFR BLD: 57.9 %
PERFORMED ON: ABNORMAL
PLATELET # BLD AUTO: 142 K/UL (ref 135–450)
PMV BLD AUTO: 8.7 FL (ref 5–10.5)
POTASSIUM SERPL-SCNC: 4.2 MMOL/L (ref 3.5–5.1)
RBC # BLD AUTO: 3.87 M/UL (ref 4.2–5.9)
SODIUM SERPL-SCNC: 137 MMOL/L (ref 136–145)
WBC # BLD AUTO: 7.2 K/UL (ref 4–11)

## 2025-07-21 PROCEDURE — 97530 THERAPEUTIC ACTIVITIES: CPT

## 2025-07-21 PROCEDURE — 97535 SELF CARE MNGMENT TRAINING: CPT

## 2025-07-21 PROCEDURE — 97130 THER IVNTJ EA ADDL 15 MIN: CPT

## 2025-07-21 PROCEDURE — 6360000002 HC RX W HCPCS: Performed by: PHYSICAL MEDICINE & REHABILITATION

## 2025-07-21 PROCEDURE — 36415 COLL VENOUS BLD VENIPUNCTURE: CPT

## 2025-07-21 PROCEDURE — 97110 THERAPEUTIC EXERCISES: CPT

## 2025-07-21 PROCEDURE — 6370000000 HC RX 637 (ALT 250 FOR IP): Performed by: PHYSICAL MEDICINE & REHABILITATION

## 2025-07-21 PROCEDURE — 92526 ORAL FUNCTION THERAPY: CPT

## 2025-07-21 PROCEDURE — 97129 THER IVNTJ 1ST 15 MIN: CPT

## 2025-07-21 PROCEDURE — 94760 N-INVAS EAR/PLS OXIMETRY 1: CPT

## 2025-07-21 PROCEDURE — 1280000000 HC REHAB R&B

## 2025-07-21 PROCEDURE — 85025 COMPLETE CBC W/AUTO DIFF WBC: CPT

## 2025-07-21 PROCEDURE — 80048 BASIC METABOLIC PNL TOTAL CA: CPT

## 2025-07-21 PROCEDURE — 97116 GAIT TRAINING THERAPY: CPT

## 2025-07-21 RX ADMIN — PRAZOSIN HYDROCHLORIDE 1 MG: 1 CAPSULE ORAL at 21:05

## 2025-07-21 RX ADMIN — PANTOPRAZOLE SODIUM 20 MG: 20 TABLET, DELAYED RELEASE ORAL at 21:04

## 2025-07-21 RX ADMIN — GABAPENTIN 100 MG: 100 CAPSULE ORAL at 07:10

## 2025-07-21 RX ADMIN — Medication 9 MG: at 21:04

## 2025-07-21 RX ADMIN — OXYCODONE HYDROCHLORIDE 10 MG: 10 TABLET ORAL at 16:26

## 2025-07-21 RX ADMIN — METFORMIN HYDROCHLORIDE 500 MG: 500 TABLET ORAL at 07:10

## 2025-07-21 RX ADMIN — ASPIRIN 81 MG 81 MG: 81 TABLET ORAL at 07:11

## 2025-07-21 RX ADMIN — HEPARIN SODIUM 5000 UNITS: 5000 INJECTION INTRAVENOUS; SUBCUTANEOUS at 05:51

## 2025-07-21 RX ADMIN — ATORVASTATIN CALCIUM 40 MG: 40 TABLET, FILM COATED ORAL at 21:04

## 2025-07-21 RX ADMIN — INSULIN LISPRO 1 UNITS: 100 INJECTION, SOLUTION INTRAVENOUS; SUBCUTANEOUS at 16:27

## 2025-07-21 RX ADMIN — SENNOSIDES AND DOCUSATE SODIUM 1 TABLET: 8.6; 5 TABLET ORAL at 07:10

## 2025-07-21 RX ADMIN — ZOLPIDEM TARTRATE 10 MG: 5 TABLET, FILM COATED ORAL at 21:04

## 2025-07-21 RX ADMIN — HEPARIN SODIUM 5000 UNITS: 5000 INJECTION INTRAVENOUS; SUBCUTANEOUS at 14:27

## 2025-07-21 RX ADMIN — METHYLPHENIDATE HYDROCHLORIDE 5 MG: 10 TABLET ORAL at 14:27

## 2025-07-21 RX ADMIN — SENNOSIDES AND DOCUSATE SODIUM 1 TABLET: 8.6; 5 TABLET ORAL at 21:04

## 2025-07-21 RX ADMIN — PANCRELIPASE LIPASE, PANCRELIPASE PROTEASE, PANCRELIPASE AMYLASE 5000 UNITS: 5000; 17000; 24000 CAPSULE, DELAYED RELEASE ORAL at 16:26

## 2025-07-21 RX ADMIN — FLUOXETINE HYDROCHLORIDE 20 MG: 20 CAPSULE ORAL at 07:11

## 2025-07-21 RX ADMIN — METHYLPHENIDATE HYDROCHLORIDE 5 MG: 10 TABLET ORAL at 07:10

## 2025-07-21 RX ADMIN — INSULIN LISPRO 1 UNITS: 100 INJECTION, SOLUTION INTRAVENOUS; SUBCUTANEOUS at 21:04

## 2025-07-21 RX ADMIN — GABAPENTIN 100 MG: 100 CAPSULE ORAL at 14:27

## 2025-07-21 RX ADMIN — GABAPENTIN 100 MG: 100 CAPSULE ORAL at 21:04

## 2025-07-21 RX ADMIN — OXYCODONE HYDROCHLORIDE 10 MG: 10 TABLET ORAL at 21:04

## 2025-07-21 RX ADMIN — PANTOPRAZOLE SODIUM 20 MG: 20 TABLET, DELAYED RELEASE ORAL at 07:10

## 2025-07-21 RX ADMIN — HEPARIN SODIUM 5000 UNITS: 5000 INJECTION INTRAVENOUS; SUBCUTANEOUS at 21:03

## 2025-07-21 RX ADMIN — METFORMIN HYDROCHLORIDE 500 MG: 500 TABLET ORAL at 16:26

## 2025-07-21 RX ADMIN — ANTACID TABLETS 500 MG: 500 TABLET, CHEWABLE ORAL at 07:11

## 2025-07-21 ASSESSMENT — PAIN DESCRIPTION - LOCATION
LOCATION: HEAD
LOCATION: BACK

## 2025-07-21 ASSESSMENT — PAIN SCALES - GENERAL
PAINLEVEL_OUTOF10: 8
PAINLEVEL_OUTOF10: 0
PAINLEVEL_OUTOF10: 4
PAINLEVEL_OUTOF10: 4
PAINLEVEL_OUTOF10: 8

## 2025-07-21 ASSESSMENT — PAIN DESCRIPTION - DESCRIPTORS
DESCRIPTORS: SHARP
DESCRIPTORS: ACHING

## 2025-07-21 ASSESSMENT — PAIN DESCRIPTION - ORIENTATION
ORIENTATION: LOWER
ORIENTATION: RIGHT

## 2025-07-21 ASSESSMENT — PAIN DESCRIPTION - FREQUENCY
FREQUENCY: INTERMITTENT
FREQUENCY: INTERMITTENT

## 2025-07-21 ASSESSMENT — PAIN DESCRIPTION - PAIN TYPE
TYPE: CHRONIC PAIN
TYPE: CHRONIC PAIN

## 2025-07-21 ASSESSMENT — PAIN DESCRIPTION - ONSET
ONSET: ON-GOING
ONSET: ON-GOING

## 2025-07-21 ASSESSMENT — PAIN SCALES - WONG BAKER: WONGBAKER_NUMERICALRESPONSE: NO HURT

## 2025-07-21 NOTE — PROGRESS NOTES
Minutes       Clare Perdue, PT, 07/21/25 at 3:35 PM       PHYSICAL THERAPY  Progress Note   Second Session    Subjective: Pt met in recliner in room, reporting no complaints since morning session. Denies pain.     Objective: STS to RW SBA followed by short distance ambulation with RW to w/c SBA. Transported to therapy gym via w/c.     STS to RW SBA followed by 40' ambulation with RW SBA to staircase. Pt ascended/descended 12 stairs non-reciprocally with BHR SBA, requiring one cue for sequencing. Seated rest break before STS to RW SBA. Pt ascended/descended curb step with RW initially CGA x 1 rep with cues for sequencing progressing to close SBA x 1 rep with one instance of CGA on descent for safety.     NuStep for 10 mins to improve ROM, coordination, strength and endurance. Seat at 10, BLE only, resistance at 2. STS to RW SBA followed by 40' ambulation with RW SBA to w/c. STS x 3 reps to RW SBA.    Throughout session, pt required cues for sequencing of transfers including anterior weight shift, as pt frequently demonstrated posterior lean when attempting to stand without cues. Pt also required cueing throughout ambulation to increase step length and to redirect pt to task.    Assessment: Pt tolerated afternoon session well, completing most functional tasks SBA-CGA however requires constant cueing for sequencing, technique, and safety. Pt would require strict 24h supervision to return home. Continue POC to address functional deficits prior to d/c.       Safety Device - Type of devices:  []  All fall risk precautions in place [] Bed alarm in place  [] Call light within reach [] Chair alarm in place [] Positioning belt [x] Gait belt [] Patient at risk for falls [] Left in bed [] Left in chair [] Telesitter in use [] Sitter present [] Nurse notified [x]  Care transferred to OT [] Returned to room w/ transport      Therapy Time   Individual Co-treatment   Time In 1230     Time Out 1315     Minutes 45

## 2025-07-21 NOTE — CARE COORDINATION
Referral initiated to [facility] /FTH Manager for Medical transport and Em Response button.  GINGER Cruz     Case Management   [phone]    7/21/2025  12:42 PM    Sent DC date to his [facility] agency to get services back to [facility].    GINGER Cruz     Case Management   [phone]    7/21/2025  12:43 PM

## 2025-07-21 NOTE — PROGRESS NOTES
Factors: Pt in stance manages brief down with mod cues (freezing and distractible), seated he voids urine and completes hygiene seated.  Toilet Transfers  Technique:  (amb RW)  Equipment: Grab bars  Additional Factors: Cues for hand placement;Increased time to complete  Assistance Level: Minimal assistance;Stand by assist  Skilled Clinical Factors: Pt req cues for RW management during turn, freezes and distracted. Posterior LOB with Min A + pt holding onto grab bars to corrrect. SPT from toilet > WC w/ SBA, cues for hand placement on grab bar.          Functional Mobility  Device: Rolling walker  Activity: To/From bathroom  Assistance Level: Contact guard assist  Skilled Clinical Factors: ambulated into bathrm & approached toilet,  slow motor planning, freezing episodes--would push walker forward but feet lagged behind/shuffling gait; needed max cueing to stay on task, had posterior LOB during toilet transfer  Sit to Stand  Assistance Level: Stand by assist  Skilled Clinical Factors: from recliner > RW, good hand placement without cues  Stand to Sit  Assistance Level: Contact guard assist         PM Session:  Pt met in dept, reports no pain and agreeable to OT treat.     Pt partially stands from WC > table, posterior LOB into WC. Pt moves hips toward edge of WC, stands with SBA pushing through arms without cues. In stance for ~5 min folds x3 towels with BUE, cued to eliminate BUE support on table to further address balance but pt frequently needs to support himself with 1 UE, only able to stand without BUE for 5 > seconds. Overall balance was SBA, pt demo'd improved awareness of balance and stabilized himself appropriately as he was losing balance. Increased time and min cues to sequence folding. Sits to chair SBA with improve eccentric control.     Pt completes x10 WC push-ups with fair clearance of buttocks, gets easily distracted, took over 10 min to complete with max cues for sequencing.     Pt is progressing

## 2025-07-21 NOTE — PATIENT CARE CONFERENCE
[facility]  Inpatient Rehabilitation  Weekly Team Conference Note      Date: 2025  Patient Name:  Elio Almodovar    MRN: [id number]  : 1955  Gender: Male  Physician: Dr. Corby GAMING  Diagnosis: Normal pressure hydrocephalus (HCC) [G91.2]    CASE MANAGEMENT  Assessment: Goal is home with wife; agreeable to home care services.       PHYSICAL THERAPY    Bed Mobility:  Overall Assistance Level: Stand By Assist  Additional Factors: Head of bed raised, Without handrails, Increased time to complete, Verbal cues, Set-up (wedge pillow used on mat table to elevated HOB to 30 degrees)  Sit>supine:  Assistance Level: Stand by assist  Supine>sit:  Assistance Level: Stand by assist    Transfers:  Surface: To bed, From bed, To chair with arms, From chair with arms, Wheelchair  Additional Factors: Set-up, Verbal cues, Increased time to complete  Device: Walker (RW)  Sit>stand:  Assistance Level: Stand by assist  Skilled Clinical Factors: two instances of posterior LOB resulting in pt returning to sitting; no LOB noted with cues for anterior weight shift  Stand>sit:  Assistance Level: Stand by assist  Skilled Clinical Factors: no LOB noted, slow speed, few cues for controlled descent  Bed<>chair  Technique: Stand step  Assistance Level: Stand by assist  Stand Pivot:  Assistance Level: Minimal assistance  Skilled Clinical Factors: RW support, cues to turn completely before sitting, Min A to correct intermittent posterior LOB.  Lateral transfer:     Car transfer:  Assistance Level: Stand by assist  Skilled Clinical Factors: one cue for hand placement    Ambulation:  Surface: Level surface  Device: Rolling walker  Distance: 150'  Activity: Within Unit  Activity Comments: Slow speed, decreased step length, few instances of freezing requiring cues to correct  Additional Factors: Verbal cues, Increased time to complete  Assistance Level: Stand by assist  Gait Deviations: Decreased step length left, Decreased

## 2025-07-21 NOTE — DISCHARGE INSTR - COC
Continuity of Care Form    Patient Name: Elio Almodovar   :  1955  MRN:  [id number]    Admit date:  2025  Discharge date:  2025    Code Status Order: Full Code   Advance Directives:     Admitting Physician:  Elida Tavarez MD  PCP: Cecil Smith MD    Discharging Nurse: CHRISTOPHER Acevedo  Discharging Hospital Unit/Room#: N0Q-7795/3263-01  Discharging Unit Phone Number: [phone]    Emergency Contact:   Extended Emergency Contact Information  Primary Emergency Contact: Zeynep Almodovar  Address: [address] [place] [address]  Home Phone: [phone]  Mobile Phone: [phone]  Relation: Spouse    Past Surgical History:  Past Surgical History:   Procedure Laterality Date    APPENDECTOMY      PROSTATECTOMY      TONSILLECTOMY      VENTRICULOPERITONEAL SHUNT Right 2025    RIGHT VENTRICULOPERITONEAL SHUNT PLACEMENT WITH LAPAROSCOPIC ASSIST performed by Julianna Diaz MD at [facility] OR       Immunization History:   Immunization History   Administered Date(s) Administered    COVID-19, PFIZER PURPLE top, DILUTE for use, (age 12 y+), 30mcg/0.3mL 2021, 2021    TDaP, ADACEL (age 10y-64y), BOOSTRIX (age 10y+), IM, 0.5mL 2019       Active Problems:  Patient Active Problem List   Diagnosis Code    Hydrocephalus (HCC) G91.9    Hypertensive emergency I16.1    Normal pressure hydrocephalus (HCC) G91.2    Depression with suicidal ideation F32.A, R45.851    S/P  shunt Z98.2       Isolation/Infection:   Isolation            No Isolation          Patient Infection Status    None to display         Nurse Assessment:  Last Vital Signs: BP (!) 148/70   Pulse 52   Temp 97.9 °F (36.6 °C) (Oral)   Resp 18   Ht 1.727 m (5' 7.99\")   Wt 87.3 kg (192 lb 7.4 oz)   SpO2 96%   BMI 29.27 kg/m²     Last documented pain score (0-10 scale): Pain Level: 8  Last Weight:   Wt Readings from Last 1 Encounters:   25 87.3 kg

## 2025-07-21 NOTE — PROGRESS NOTES
Patient admitted to rehab with normal hydrocephalus.  A/Ox4. Transfers with walker X1 & gait belt. Mobility restrictions: WBAT. On regular  diet, tolerating well. Medications taken WWW. On heparin for DVT prophylaxis.  Skin: surgical incision to top of the head & abd x3, Redness to buttocks & bilateral heels. Oxygen: RA. LDA: none. Has been continent of bowel and incontinent of bladder. LBM 7/20/25. Chair/bed alarms in use and call light in reach. Will monitor for safety. Electronically signed by Donna Solitario RN on 7/20/2025 at 10:46 PM

## 2025-07-21 NOTE — CARE COORDINATION
SOCIAL WORK DISCHARGE SUMMARY        DATE OF DISCHARGE:  Wednesday, 7-      LOCATION:   Home        [facility]  [address] [place] [address]  Phone: [phone]  Fax: [phone]         TIME:   10-12 noon   wife        PHARMACY:   [facility]        DME:    none      Transportation Question  yes; referral to [facility] for medical transportation      IMM:   7-    Referrals made:  Home care and [facility] for medical transportation and Emergency response button.    GINGER Cruz     Case Management   [phone]    7/21/2025  4:48 PM

## 2025-07-21 NOTE — PLAN OF CARE
Problem: Chronic Conditions and Co-morbidities  Goal: Patient's chronic conditions and co-morbidity symptoms are monitored and maintained or improved  7/20/2025 2236 by Donna Solitario RN  Outcome: Progressing  Flowsheets (Taken 7/15/2025 0939 by Jose Sun, RN)  Care Plan - Patient's Chronic Conditions and Co-Morbidity Symptoms are Monitored and Maintained or Improved: Monitor and assess patient's chronic conditions and comorbid symptoms for stability, deterioration, or improvement     Problem: Discharge Planning  Goal: Discharge to home or other facility with appropriate resources  7/20/2025 2236 by Donna Solitario RN  Outcome: Progressing  Flowsheets (Taken 7/14/2025 0717 by Elida Garcia, RN)  Discharge to home or other facility with appropriate resources: Identify barriers to discharge with patient and caregiver     Problem: Safety - Adult  Goal: Free from fall injury  7/20/2025 2236 by Donna Solitario RN  Outcome: Progressing  Flowsheets (Taken 7/16/2025 1118 by Jose Sun, RN)  Free From Fall Injury: Instruct family/caregiver on patient safety     Problem: Pain  Goal: Verbalizes/displays adequate comfort level or baseline comfort level  7/20/2025 2236 by Donna Solitario RN  Outcome: Progressing  Flowsheets (Taken 7/16/2025 1118 by Jose Sun, RN)  Verbalizes/displays adequate comfort level or baseline comfort level:   Encourage patient to monitor pain and request assistance   Administer analgesics based on type and severity of pain and evaluate response   Assess pain using appropriate pain scale   Implement non-pharmacological measures as appropriate and evaluate response     Problem: Skin/Tissue Integrity  Goal: Skin integrity remains intact  Description: 1.  Monitor for areas of redness and/or skin breakdown  2.  Assess vascular access sites hourly  3.  Every 4-6 hours minimum:  Change oxygen saturation probe site  4.  Every 4-6 hours:  If on nasal continuous positive airway pressure, respiratory

## 2025-07-21 NOTE — PLAN OF CARE
Problem: Chronic Conditions and Co-morbidities  Goal: Patient's chronic conditions and co-morbidity symptoms are monitored and maintained or improved  7/21/2025 1103 by Curtis Mann RN  Outcome: Progressing  Flowsheets (Taken 7/15/2025 0939 by Jose Sun, RN)  Care Plan - Patient's Chronic Conditions and Co-Morbidity Symptoms are Monitored and Maintained or Improved: Monitor and assess patient's chronic conditions and comorbid symptoms for stability, deterioration, or improvement  7/20/2025 2236 by Donna Solitario RN  Outcome: Progressing  Flowsheets (Taken 7/15/2025 0939 by Jose Sun, RN)  Care Plan - Patient's Chronic Conditions and Co-Morbidity Symptoms are Monitored and Maintained or Improved: Monitor and assess patient's chronic conditions and comorbid symptoms for stability, deterioration, or improvement     Problem: Discharge Planning  Goal: Discharge to home or other facility with appropriate resources  7/21/2025 1103 by Curtis Mann RN  Outcome: Progressing  Flowsheets (Taken 7/14/2025 0717 by Elida Garcia RN)  Discharge to home or other facility with appropriate resources: Identify barriers to discharge with patient and caregiver  7/20/2025 2236 by Donna Solitario RN  Outcome: Progressing  Flowsheets (Taken 7/14/2025 0717 by Elida Garcia RN)  Discharge to home or other facility with appropriate resources: Identify barriers to discharge with patient and caregiver     Problem: Safety - Adult  Goal: Free from fall injury  7/21/2025 1103 by Curtis Mann RN  Outcome: Progressing  Flowsheets (Taken 7/16/2025 1118 by Jose Sun, RN)  Free From Fall Injury: Instruct family/caregiver on patient safety  7/20/2025 2236 by Donna Solitario RN  Outcome: Progressing  Flowsheets (Taken 7/16/2025 1118 by Jose Sun, RN)  Free From Fall Injury: Instruct family/caregiver on patient safety     Problem: Pain  Goal: Verbalizes/displays adequate comfort level or baseline comfort

## 2025-07-21 NOTE — PROGRESS NOTES
ACUTE REHAB UNIT  SPEECH/LANGUAGE PATHOLOGY      [x] Daily  [x] Weekly Care Conference Note  [] Discharge    Patient:Elio Almodovar      :1955  MRN:[id number]  Rehab Dx/Hx: Normal pressure hydrocephalus (HCC) [G91.2]   CT head 2025: IMPRESSION: 1. Stable head CT.  CT Head 2025: IMPRESSION: 1.  Unchanged size and configuration of the ventricles with unchanged positioning of right  shunt catheter.2.  Trace layering intraventricular hemorrhage in the left temporal horn, new/increased from prior.3.  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  CTA Head Neck 2025: IMPRESSION:No flow significant stenosis within the head or neck.  XR Shunt series 2024: IMPRESSION:1. The orientation is reversed, findings will be discussed with referring physician. IMPRESSION:1. The orientation is reversed, findings will be discussed with referring physician  MRI Brain: 7/3/2025: IMPRESSION:Acute zone of infarction in the right anterior frontal lobe.Cardiomegaly, with ventriculostomy catheter in place.  No chest XR this admit encounter. Last chest XR on available chart review   Onset: 7/3/2025 Date of Admit: 2025  Room #: V2U-6437/3263-01    Precautions: falls, seizures, and post shunt precautions  Home situation: Lives with family; assist from family   Baseline function:  retired ; history of SLP tx in  for SLP/cognitive -linguistic ; otherwise reportedly was IND in basic DL situations and participate with family in some adv DL.Did not drive. Will need to confirmation of history by family  Baseline Diet: regular  ST Dx:Cognitive-Linguistic Deficits   Initial Speech Therapy Assessment Diagnosis:   1. Cognitive Diagnosis: Testing revealed deficits in attention; working memory and STM; and numeric reasoning. Ongoing assess of complex PS/reasoning and tx trial for temporal orientation and memory incorporating use of memory strategies. Will need to get confirmed baseline function. Pt with history

## 2025-07-21 NOTE — PROGRESS NOTES
Patient admitted to rehab with hydrocephalus.  A/Ox4. Transfers with walker x1. Mobility restrictions: WBAT. On regular 4CC diet, tolerating well. Medications taken whole orally. On heparin for DVT prophylaxis.  Skin: with surgical incisions on scalp with is GARRETT- clean and dry, and surgical incisions on abdomen with surgical glue. Oxygen: on room air. Has been continent of bowel and incontinent of bladder. LBM 07/20/25. Chair/bed alarms in use and call light in reach.   Electronically signed by Curtis Mann RN on 7/21/2025 at 11:08 AM

## 2025-07-21 NOTE — PROGRESS NOTES
Department of Physical Medicine & Rehabilitation  Progress Note    Patient Identification:  Elio Almodovar  [id number]  : 1955  Admit date: 2025    Chief Complaint: Normal pressure hydrocephalus (HCC)    Subjective:   No acute events overnight.   Patient seen this am sitting up in gym. Reports sleep improved with increase in Ambien.   Labs reviewed.     ROS: No f/c, n/v, cp     Objective:  Patient Vitals for the past 24 hrs:   BP Temp Temp src Pulse Resp SpO2 Weight   25 0826 -- -- -- -- -- 96 % --   25 0709 (!) 148/70 97.9 °F (36.6 °C) Oral 52 18 95 % --   25 0602 -- -- -- -- -- -- 87.3 kg (192 lb 7.4 oz)   25 2030 (!) 142/57 97.9 °F (36.6 °C) Oral 56 18 96 % --   25 1349 -- -- -- -- 16 -- --     Const: Alert. No distress, pleasant.   HEENT: Right side VPS sites well approximated with staples now removed. Normal sclera/conjunctiva. MMM.   CV: Regular rate and rhythm.   Resp: No respiratory distress. Lungs CTAB.   Abd: Soft, nontender, nondistended, NABS+   Ext: No edema.   Neuro: Alert, oriented, some impaired recall   Psych: Cooperative, appropriate mood and affect    Laboratory data: Available via EMR.   Last 24 hour lab  Recent Results (from the past 24 hours)   POCT Glucose    Collection Time: 25  4:00 PM   Result Value Ref Range    POC Glucose 185 (H) 70 - 99 mg/dl    Performed on ACCU-CHEK    POCT Glucose    Collection Time: 25  8:54 PM   Result Value Ref Range    POC Glucose 157 (H) 70 - 99 mg/dl    Performed on ACCU-CHEK    CBC with Auto Differential    Collection Time: 25  5:39 AM   Result Value Ref Range    WBC 7.2 4.0 - 11.0 K/uL    RBC 3.87 (L) 4.20 - 5.90 M/uL    Hemoglobin 12.1 (L) 13.5 - 17.5 g/dL    Hematocrit 34.8 (L) 40.5 - 52.5 %    MCV 89.9 80.0 - 100.0 fL    MCH 31.3 26.0 - 34.0 pg    MCHC 34.8 31.0 - 36.0 g/dL    RDW 14.4 12.4 - 15.4 %    Platelets 142 135 - 450 K/uL    MPV 8.7 5.0 - 10.5 fL    Neutrophils % 57.9 %    Lymphocytes %

## 2025-07-22 PROBLEM — I63.9 CEREBROVASCULAR ACCIDENT (CVA) (HCC): Status: ACTIVE | Noted: 2025-07-22

## 2025-07-22 LAB
ANION GAP SERPL CALCULATED.3IONS-SCNC: 13 MMOL/L (ref 3–16)
BACTERIA URNS QL MICRO: NORMAL /HPF
BASOPHILS # BLD: 0 K/UL (ref 0–0.2)
BASOPHILS NFR BLD: 0.4 %
BILIRUB UR QL STRIP.AUTO: NEGATIVE
BUN SERPL-MCNC: 17 MG/DL (ref 7–20)
CALCIUM SERPL-MCNC: 9.5 MG/DL (ref 8.3–10.6)
CHLORIDE SERPL-SCNC: 102 MMOL/L (ref 99–110)
CLARITY UR: CLEAR
CO2 SERPL-SCNC: 23 MMOL/L (ref 21–32)
COLOR UR: YELLOW
CREAT SERPL-MCNC: 1.1 MG/DL (ref 0.8–1.3)
DEPRECATED RDW RBC AUTO: 15 % (ref 12.4–15.4)
EOSINOPHIL # BLD: 0.2 K/UL (ref 0–0.6)
EOSINOPHIL NFR BLD: 2.5 %
EPI CELLS #/AREA URNS AUTO: 2 /HPF (ref 0–5)
GFR SERPLBLD CREATININE-BSD FMLA CKD-EPI: 72 ML/MIN/{1.73_M2}
GLUCOSE BLD-MCNC: 148 MG/DL (ref 70–99)
GLUCOSE BLD-MCNC: 155 MG/DL (ref 70–99)
GLUCOSE BLD-MCNC: 165 MG/DL (ref 70–99)
GLUCOSE BLD-MCNC: 219 MG/DL (ref 70–99)
GLUCOSE SERPL-MCNC: 143 MG/DL (ref 70–99)
GLUCOSE UR STRIP.AUTO-MCNC: NEGATIVE MG/DL
HCT VFR BLD AUTO: 35.7 % (ref 40.5–52.5)
HGB BLD-MCNC: 12.5 G/DL (ref 13.5–17.5)
HGB UR QL STRIP.AUTO: NEGATIVE
HYALINE CASTS #/AREA URNS AUTO: 2 /LPF (ref 0–8)
KETONES UR STRIP.AUTO-MCNC: ABNORMAL MG/DL
LEUKOCYTE ESTERASE UR QL STRIP.AUTO: NEGATIVE
LYMPHOCYTES # BLD: 1.6 K/UL (ref 1–5.1)
LYMPHOCYTES NFR BLD: 20.2 %
MCH RBC QN AUTO: 31.7 PG (ref 26–34)
MCHC RBC AUTO-ENTMCNC: 35.1 G/DL (ref 31–36)
MCV RBC AUTO: 90.5 FL (ref 80–100)
MONOCYTES # BLD: 0.7 K/UL (ref 0–1.3)
MONOCYTES NFR BLD: 8.8 %
NEUTROPHILS # BLD: 5.4 K/UL (ref 1.7–7.7)
NEUTROPHILS NFR BLD: 68.1 %
NITRITE UR QL STRIP.AUTO: NEGATIVE
PERFORMED ON: ABNORMAL
PH UR STRIP.AUTO: 5.5 [PH] (ref 5–8)
PLATELET # BLD AUTO: 147 K/UL (ref 135–450)
PMV BLD AUTO: 8.7 FL (ref 5–10.5)
POTASSIUM SERPL-SCNC: 4.3 MMOL/L (ref 3.5–5.1)
PROT UR STRIP.AUTO-MCNC: ABNORMAL MG/DL
RBC # BLD AUTO: 3.95 M/UL (ref 4.2–5.9)
RBC CLUMPS #/AREA URNS AUTO: 2 /HPF (ref 0–4)
SODIUM SERPL-SCNC: 138 MMOL/L (ref 136–145)
SP GR UR STRIP.AUTO: 1.03 (ref 1–1.03)
UA COMPLETE W REFLEX CULTURE PNL UR: ABNORMAL
UA DIPSTICK W REFLEX MICRO PNL UR: YES
URN SPEC COLLECT METH UR: ABNORMAL
UROBILINOGEN UR STRIP-ACNC: 1 E.U./DL
WBC # BLD AUTO: 7.9 K/UL (ref 4–11)
WBC #/AREA URNS AUTO: 1 /HPF (ref 0–5)

## 2025-07-22 PROCEDURE — 97530 THERAPEUTIC ACTIVITIES: CPT

## 2025-07-22 PROCEDURE — 97129 THER IVNTJ 1ST 15 MIN: CPT

## 2025-07-22 PROCEDURE — 85025 COMPLETE CBC W/AUTO DIFF WBC: CPT

## 2025-07-22 PROCEDURE — 6360000002 HC RX W HCPCS: Performed by: PHYSICAL MEDICINE & REHABILITATION

## 2025-07-22 PROCEDURE — 36415 COLL VENOUS BLD VENIPUNCTURE: CPT

## 2025-07-22 PROCEDURE — 97535 SELF CARE MNGMENT TRAINING: CPT

## 2025-07-22 PROCEDURE — 80048 BASIC METABOLIC PNL TOTAL CA: CPT

## 2025-07-22 PROCEDURE — 1280000000 HC REHAB R&B

## 2025-07-22 PROCEDURE — 94760 N-INVAS EAR/PLS OXIMETRY 1: CPT

## 2025-07-22 PROCEDURE — 97116 GAIT TRAINING THERAPY: CPT

## 2025-07-22 PROCEDURE — 97130 THER IVNTJ EA ADDL 15 MIN: CPT

## 2025-07-22 PROCEDURE — 6370000000 HC RX 637 (ALT 250 FOR IP): Performed by: PHYSICAL MEDICINE & REHABILITATION

## 2025-07-22 PROCEDURE — 81001 URINALYSIS AUTO W/SCOPE: CPT

## 2025-07-22 PROCEDURE — 97110 THERAPEUTIC EXERCISES: CPT

## 2025-07-22 RX ORDER — ZOLPIDEM TARTRATE 5 MG/1
7.5 TABLET ORAL NIGHTLY PRN
Status: DISCONTINUED | OUTPATIENT
Start: 2025-07-22 | End: 2025-07-24 | Stop reason: HOSPADM

## 2025-07-22 RX ADMIN — SENNOSIDES AND DOCUSATE SODIUM 1 TABLET: 8.6; 5 TABLET ORAL at 20:27

## 2025-07-22 RX ADMIN — PANTOPRAZOLE SODIUM 20 MG: 20 TABLET, DELAYED RELEASE ORAL at 20:27

## 2025-07-22 RX ADMIN — FLUOXETINE HYDROCHLORIDE 20 MG: 20 CAPSULE ORAL at 07:32

## 2025-07-22 RX ADMIN — INSULIN LISPRO 1 UNITS: 100 INJECTION, SOLUTION INTRAVENOUS; SUBCUTANEOUS at 20:29

## 2025-07-22 RX ADMIN — PANCRELIPASE LIPASE, PANCRELIPASE PROTEASE, PANCRELIPASE AMYLASE 5000 UNITS: 5000; 17000; 24000 CAPSULE, DELAYED RELEASE ORAL at 07:32

## 2025-07-22 RX ADMIN — METHYLPHENIDATE HYDROCHLORIDE 5 MG: 10 TABLET ORAL at 07:32

## 2025-07-22 RX ADMIN — ZOLPIDEM TARTRATE 7.5 MG: 5 TABLET, FILM COATED ORAL at 20:27

## 2025-07-22 RX ADMIN — METFORMIN HYDROCHLORIDE 500 MG: 500 TABLET ORAL at 16:32

## 2025-07-22 RX ADMIN — GABAPENTIN 100 MG: 100 CAPSULE ORAL at 07:32

## 2025-07-22 RX ADMIN — OXYCODONE HYDROCHLORIDE 10 MG: 10 TABLET ORAL at 14:00

## 2025-07-22 RX ADMIN — HEPARIN SODIUM 5000 UNITS: 5000 INJECTION INTRAVENOUS; SUBCUTANEOUS at 05:28

## 2025-07-22 RX ADMIN — METHYLPHENIDATE HYDROCHLORIDE 5 MG: 10 TABLET ORAL at 14:00

## 2025-07-22 RX ADMIN — GABAPENTIN 100 MG: 100 CAPSULE ORAL at 20:27

## 2025-07-22 RX ADMIN — PRAZOSIN HYDROCHLORIDE 1 MG: 1 CAPSULE ORAL at 20:26

## 2025-07-22 RX ADMIN — ASPIRIN 81 MG 81 MG: 81 TABLET ORAL at 07:32

## 2025-07-22 RX ADMIN — ANTACID TABLETS 500 MG: 500 TABLET, CHEWABLE ORAL at 07:32

## 2025-07-22 RX ADMIN — GABAPENTIN 100 MG: 100 CAPSULE ORAL at 14:00

## 2025-07-22 RX ADMIN — PANCRELIPASE LIPASE, PANCRELIPASE PROTEASE, PANCRELIPASE AMYLASE 5000 UNITS: 5000; 17000; 24000 CAPSULE, DELAYED RELEASE ORAL at 16:32

## 2025-07-22 RX ADMIN — HEPARIN SODIUM 5000 UNITS: 5000 INJECTION INTRAVENOUS; SUBCUTANEOUS at 21:32

## 2025-07-22 RX ADMIN — HEPARIN SODIUM 5000 UNITS: 5000 INJECTION INTRAVENOUS; SUBCUTANEOUS at 13:58

## 2025-07-22 RX ADMIN — PANTOPRAZOLE SODIUM 20 MG: 20 TABLET, DELAYED RELEASE ORAL at 07:32

## 2025-07-22 RX ADMIN — OXYCODONE HYDROCHLORIDE 10 MG: 10 TABLET ORAL at 05:24

## 2025-07-22 RX ADMIN — PANCRELIPASE LIPASE, PANCRELIPASE PROTEASE, PANCRELIPASE AMYLASE 5000 UNITS: 5000; 17000; 24000 CAPSULE, DELAYED RELEASE ORAL at 11:55

## 2025-07-22 RX ADMIN — METFORMIN HYDROCHLORIDE 500 MG: 500 TABLET ORAL at 07:32

## 2025-07-22 RX ADMIN — SENNOSIDES AND DOCUSATE SODIUM 1 TABLET: 8.6; 5 TABLET ORAL at 07:32

## 2025-07-22 RX ADMIN — ATORVASTATIN CALCIUM 40 MG: 40 TABLET, FILM COATED ORAL at 20:27

## 2025-07-22 ASSESSMENT — PAIN DESCRIPTION - ORIENTATION
ORIENTATION: LOWER
ORIENTATION: RIGHT

## 2025-07-22 ASSESSMENT — PAIN SCALES - GENERAL
PAINLEVEL_OUTOF10: 0
PAINLEVEL_OUTOF10: 6
PAINLEVEL_OUTOF10: 9
PAINLEVEL_OUTOF10: 0
PAINLEVEL_OUTOF10: 7

## 2025-07-22 ASSESSMENT — PAIN DESCRIPTION - LOCATION
LOCATION: BACK
LOCATION: HEAD

## 2025-07-22 ASSESSMENT — PAIN DESCRIPTION - DESCRIPTORS
DESCRIPTORS: SHARP
DESCRIPTORS: ACHING;DISCOMFORT

## 2025-07-22 NOTE — PROGRESS NOTES
Patient reminded to use the call light for assistance to the bathroom, patient remains incontinent of bladder.

## 2025-07-22 NOTE — PROGRESS NOTES
ACUTE REHAB UNIT  SPEECH/LANGUAGE PATHOLOGY      [x] Daily  [] Weekly Care Conference Note  [x] Discharge    Patient:Elio Almodovar      :1955  MRN:[id number]  Rehab Dx/Hx: Normal pressure hydrocephalus (HCC) [G91.2]   CT head 2025: IMPRESSION: 1. Stable head CT.  CT Head 2025: IMPRESSION: 1.  Unchanged size and configuration of the ventricles with unchanged positioning of right  shunt catheter.2.  Trace layering intraventricular hemorrhage in the left temporal horn, new/increased from prior.3.  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  CTA Head Neck 2025: IMPRESSION:No flow significant stenosis within the head or neck.  XR Shunt series 2024: IMPRESSION:1. The orientation is reversed, findings will be discussed with referring physician. IMPRESSION:1. The orientation is reversed, findings will be discussed with referring physician  MRI Brain: 7/3/2025: IMPRESSION:Acute zone of infarction in the right anterior frontal lobe.Cardiomegaly, with ventriculostomy catheter in place.  No chest XR this admit encounter. Last chest XR on available chart review   Onset: 7/3/2025 Date of Admit: 2025  Room #: D6J-2309/3263-01    Precautions: falls, seizures, and post shunt precautions  Home situation: Lives with family; assist from family   Baseline function:  retired ; history of SLP tx in  for SLP/cognitive -linguistic ; otherwise reportedly was IND in basic DL situations and participate with family in some adv DL.Did not drive. Will need to confirmation of history by family  Baseline Diet: regular  ST Dx:Cognitive-Linguistic Deficits   Initial Speech Therapy Assessment Diagnosis:   1. Cognitive Diagnosis: Testing revealed deficits in attention; working memory and STM; and numeric reasoning. Ongoing assess of complex PS/reasoning and tx trial for temporal orientation and memory incorporating use of memory strategies. Will need to get confirmed baseline function. Pt with history

## 2025-07-22 NOTE — PROGRESS NOTES
Patient admitted to rehab with normal pressure hydrocephalus.  A/Ox4. Transfers with walker. Mobility restrictions: WBAT. On regular 4CC diet, tolerating well. Medications taken whole with thins. On heparin for DVT prophylaxis.  Skin: surgical incision to top of head and abdomen, redness to bilateral heels (skin prep applied), redness to buttocks. Oxygen: RA. LDA: NONE. Has been continent of bowel and incontinent of bladder. LBM 7/20. Chair/bed alarms in use and call light in reach.     Patient assisted with dressing and up to the chair. No complain of pain. Patient unsteady with ambulation this AM. Patient encouraged to use the call light when in need of assistance.

## 2025-07-22 NOTE — PLAN OF CARE
Problem: Chronic Conditions and Co-morbidities  Goal: Patient's chronic conditions and co-morbidity symptoms are monitored and maintained or improved  7/21/2025 2229 by Cierra Owens RN  Outcome: Progressing     Problem: Safety - Adult  Goal: Free from fall injury  7/21/2025 2229 by Cierra Owens RN  Outcome: Progressing     Problem: Pain  Goal: Verbalizes/displays adequate comfort level or baseline comfort level  7/21/2025 2229 by Cierra Owens RN  Outcome: Progressing     Problem: Skin/Tissue Integrity  Goal: Skin integrity remains intact  Description: 1.  Monitor for areas of redness and/or skin breakdown  2.  Assess vascular access sites hourly  3.  Every 4-6 hours minimum:  Change oxygen saturation probe site  4.  Every 4-6 hours:  If on nasal continuous positive airway pressure, respiratory therapy assess nares and determine need for appliance change or resting period  7/21/2025 2229 by Cierra Owens RN  Outcome: Progressing     Problem: ABCDS Injury Assessment  Goal: Absence of physical injury  7/21/2025 2229 by Cierra Owens RN  Outcome: Progressing     Problem: Skin/Tissue Integrity - Adult  Goal: Skin integrity remains intact  Description: 1.  Monitor for areas of redness and/or skin breakdown  2.  Assess vascular access sites hourly  3.  Every 4-6 hours minimum:  Change oxygen saturation probe site  4.  Every 4-6 hours:  If on nasal continuous positive airway pressure, respiratory therapy assess nares and determine need for appliance change or resting period  7/21/2025 2229 by Cierra Owens RN  Outcome: Progressing     Problem: Musculoskeletal - Adult  Goal: Return ADL status to a safe level of function  7/21/2025 2229 by Cierra Owens RN  Outcome: Progressing     Problem: Infection - Adult  Goal: Absence of infection at discharge  7/21/2025 2229 by Cierra Owens RN  Outcome: Progressing     Problem: Metabolic/Fluid and Electrolytes - Adult  Goal: Electrolytes maintained

## 2025-07-22 NOTE — PROGRESS NOTES
Occupational Therapy  Facility/Department: [address] REHAB  Rehabilitation Occupational Therapy Daily AM and PM Treatment Note    Date: 25  Patient Name: lEio Almodovar       Room: P7H-9459/3263-01  MRN: [id number]  Account: [id number]   : 1955  (70 y.o.) Gender: male           Additional Pertinent Hx: Per Dr. Tavarez H&P \"Patient is a 71 yo M with pmh HTN, DM2, prostate cancer, depression, h/o etoh abuse, and recently diagnosed normal pressure hydrocephalus who initially presented to the [facility] on 2025 for placement of right  shunt (with Dr. Diaz). Post op course was notable for acute ischemic stroke in the right frontal lobe along the approach of VPS catheter. Neurology consulted, stroke possibly from local arterial compression. Plan is to start ASA 81 mg 14 days post-op. Course was also complicated by depression with suicidal ideation. Telehealth Psychiatry consult was completed. Recommendation is for outpatient Psych follow-up.  Patient now presents to ARU with impaired mobility, self-care, and cognition below his baseline.\"        Past Medical History:  has a past medical history of Cancer (HCC), Diabetes mellitus (HCC), History of alcohol abuse, History of blood transfusion, History of pancreatitis, Hypertension, and NPH (normal pressure hydrocephalus) (HCC).  Past Surgical History:   has a past surgical history that includes Prostatectomy; Appendectomy; Tonsillectomy; and Ventriculoperitoneal shunt (Right, 2025).    Restrictions  Restrictions/Precautions: Fall Risk  Other Position/Activity Restrictions: Elevate HOB 30 degrees; activity as tolerated    Subjective  Subjective: Pt met in room, reports no pain and slept well, agreeable to OT ADL. Per PT, pt with mult LOB during session and fatigued.  Restrictions/Precautions: Fall Risk             Objective     Cognition  Overall Cognitive Status: Exceptions  Arousal/Alertness: Appropriate responses to stimuli  Following

## 2025-07-22 NOTE — PLAN OF CARE
Problem: Chronic Conditions and Co-morbidities  Goal: Patient's chronic conditions and co-morbidity symptoms are monitored and maintained or improved  7/22/2025 1017 by Elida Garcia RN  Outcome: Progressing  Flowsheets (Taken 7/22/2025 0717)  Care Plan - Patient's Chronic Conditions and Co-Morbidity Symptoms are Monitored and Maintained or Improved: Monitor and assess patient's chronic conditions and comorbid symptoms for stability, deterioration, or improvement     Problem: Discharge Planning  Goal: Discharge to home or other facility with appropriate resources  Outcome: Progressing  Flowsheets (Taken 7/22/2025 0717)  Discharge to home or other facility with appropriate resources: Identify barriers to discharge with patient and caregiver     Problem: Safety - Adult  Goal: Free from fall injury  7/22/2025 1017 by Elida Garcia RN  Outcome: Progressing  Flowsheets (Taken 7/22/2025 0800)  Free From Fall Injury: Instruct family/caregiver on patient safety     Problem: Pain  Goal: Verbalizes/displays adequate comfort level or baseline comfort level  7/22/2025 1017 by Elida Garcia RN  Outcome: Progressing  Flowsheets (Taken 7/22/2025 0717)  Verbalizes/displays adequate comfort level or baseline comfort level:   Encourage patient to monitor pain and request assistance   Assess pain using appropriate pain scale     Problem: Skin/Tissue Integrity  Goal: Skin integrity remains intact  Description: 1.  Monitor for areas of redness and/or skin breakdown  2.  Assess vascular access sites hourly  3.  Every 4-6 hours minimum:  Change oxygen saturation probe site  4.  Every 4-6 hours:  If on nasal continuous positive airway pressure, respiratory therapy assess nares and determine need for appliance change or resting period  7/22/2025 1017 by Elida Garcia RN  Outcome: Progressing  Flowsheets  Taken 7/22/2025 0800  Skin Integrity Remains Intact: Monitor for areas of redness and/or skin breakdown  Taken

## 2025-07-22 NOTE — PROGRESS NOTES
assist  Skilled Clinical Factors: Pt in stance manages brief down with mod cues (freezing and distractible), seated he voids urine and completes hygiene seated.    Speech therapy:    ADULT DIET; Regular; 4 carb choices (60 gm/meal); Safety Tray; Safety Tray (Disposables)  ADULT ORAL NUTRITION SUPPLEMENT; Dinner; Diabetic Oral Supplement  ADULT ORAL NUTRITION SUPPLEMENT; Breakfast; Other Oral Supplement; Greek yogurt        Body mass index is 29.71 kg/m².    Rehabilitation Diagnosis:   Brain, 2.1, Non-Traumatic        Assessment and Plan:     Impairments: decreased balance, subtle left hemiparesis, cognition     Normal pressure hydrocephalus  -s/p right  shunt placement (7/1 with Dr. Diaz)  -wound care per [facility]  -PT/OT/SLP     Acute ischemic stroke  -Localization: right frontal lobe along approach of VPS catheter  -Etiology: possible local arterial compression  -Ok to start ASA 81 mg on POD #14 (7/15) per [facility]  -continue atorvastatin  -PT/OT/SLP     Bradycardia, asymptomatic  -EKG with sinus bradycardia  -Holding metoprolol  --monitor HR, remains mostly 50s     Depression  -seen by Telehealth Psychiatry at [facility]. Recommend outpatient follow-up.   -continue fluoxetine, prazosin  -monitor mood     Insomnia  -continue melatonin scheduled, zolpidem prn (decrease dose in case this is contributing to fatigue/balance deficits)     ADHD  -continue methylphenidate     HTN  -holding metoprolol due to bradycardia as above.   -holding home lisinopril   --monitor BP trend, remains overall controlled     HLD  -continue atorvastatin     DM2 with hyperglycemia  -Was not on insulin at home.  -Dc'ed lantus and resumed home metformin  -continue ISS while inpatient.    --monitor trend, improving      Bladder: H/o prostate cancer, High risk retention   -Monitor PVRs, SC prn >400cc  -Check UA     Bowel   -High risk constipation   -senna+colace BID, PRN miralax, MoM, and bisacodyl supp.     Safety   -fall and aspiration precautions

## 2025-07-22 NOTE — PROGRESS NOTES
Patient admitted to rehab with Normal pressure hydrocephalis.  A/Ox4. Transfers with walker. Mobility restrictions: WBAT. On 4 CC diet diet, tolerating well. Medications taken whole with thins. On heparin for DVT prophylaxis.  Skin: Sx incision on R scalp, abdomen. Blanchable redness on buttocks and heels. Kept heels off the mattress Oxygen: RA. Has been continent of bladder. LBM 7/20. Advised to call if in need of assistance. Chair/bed alarms in use and call light in reach. Monitored accordingly.

## 2025-07-22 NOTE — PROGRESS NOTES
Physical Therapy  Facility/Department: [address] REHAB  Rehabilitation Physical Therapy Treatment Note    NAME: Elio Almodovar  : 1955 (70 y.o.)  MRN: [id number]  CODE STATUS: Full Code    Date of Service: 25       Restrictions:  Restrictions/Precautions: Fall Risk  Position Activity Restriction  Other Position/Activity Restrictions: Elevate HOB 30 degrees; activity as tolerated     SUBJECTIVE  Subjective: Pt met in w/c in gym, reporting feeling abnormally tired this morning.  Pain: Denies pain.       OBJECTIVE  Cognition  Overall Cognitive Status: Exceptions  Arousal/Alertness: Appropriate responses to stimuli  Following Commands: Follows one step commands with repetition;Follows one step commands with increased time  Memory: Appears intact  Safety Judgement: Decreased awareness of need for assistance;Decreased awareness of need for safety  Problem Solving: Assistance required to identify errors made;Assistance required to generate solutions;Assistance required to implement solutions  Insights: Decreased awareness of deficits  Initiation: Requires cues for some  Cognition Comment: Delayed processing and distractible, slow motor planning  Orientation  Overall Orientation Status: Within Functional Limits    Functional Mobility  Bed Mobility  Overall Assistance Level: Supervision  Additional Factors: Head of bed raised;Without handrails;Increased time to complete;Verbal cues;Set-up  Bridging  Assistance Level: Supervision  Roll Left  Assistance Level: Supervision  Roll Right  Assistance Level: Supervision  Sit to Supine  Assistance Level: Supervision  Skilled Clinical Factors: increased time and effort  Supine to Sit  Assistance Level: Supervision  Skilled Clinical Factors: increased time and effort  Scooting  Assistance Level: Supervision  Transfers  Surface: To bed;From bed;Wheelchair  Additional Factors: Set-up;Verbal cues;Increased time to complete  Device: Walker (RW)  Sit to Stand  Assistance

## 2025-07-23 ENCOUNTER — APPOINTMENT (OUTPATIENT)
Dept: CT IMAGING | Age: 70
DRG: 057 | End: 2025-07-23
Attending: PHYSICAL MEDICINE & REHABILITATION
Payer: MEDICARE

## 2025-07-23 LAB
GLUCOSE BLD-MCNC: 153 MG/DL (ref 70–99)
GLUCOSE BLD-MCNC: 159 MG/DL (ref 70–99)
GLUCOSE BLD-MCNC: 212 MG/DL (ref 70–99)
GLUCOSE BLD-MCNC: 218 MG/DL (ref 70–99)
PERFORMED ON: ABNORMAL

## 2025-07-23 PROCEDURE — 97110 THERAPEUTIC EXERCISES: CPT

## 2025-07-23 PROCEDURE — 1280000000 HC REHAB R&B

## 2025-07-23 PROCEDURE — 94760 N-INVAS EAR/PLS OXIMETRY 1: CPT

## 2025-07-23 PROCEDURE — 97130 THER IVNTJ EA ADDL 15 MIN: CPT

## 2025-07-23 PROCEDURE — 97116 GAIT TRAINING THERAPY: CPT

## 2025-07-23 PROCEDURE — 97530 THERAPEUTIC ACTIVITIES: CPT

## 2025-07-23 PROCEDURE — 70450 CT HEAD/BRAIN W/O DYE: CPT

## 2025-07-23 PROCEDURE — 6370000000 HC RX 637 (ALT 250 FOR IP): Performed by: PHYSICAL MEDICINE & REHABILITATION

## 2025-07-23 PROCEDURE — 97535 SELF CARE MNGMENT TRAINING: CPT

## 2025-07-23 PROCEDURE — 6360000002 HC RX W HCPCS: Performed by: PHYSICAL MEDICINE & REHABILITATION

## 2025-07-23 PROCEDURE — 97129 THER IVNTJ 1ST 15 MIN: CPT

## 2025-07-23 RX ADMIN — PANCRELIPASE LIPASE, PANCRELIPASE PROTEASE, PANCRELIPASE AMYLASE 5000 UNITS: 5000; 17000; 24000 CAPSULE, DELAYED RELEASE ORAL at 07:21

## 2025-07-23 RX ADMIN — ANTACID TABLETS 500 MG: 500 TABLET, CHEWABLE ORAL at 07:21

## 2025-07-23 RX ADMIN — FLUOXETINE HYDROCHLORIDE 20 MG: 20 CAPSULE ORAL at 07:22

## 2025-07-23 RX ADMIN — METHYLPHENIDATE HYDROCHLORIDE 5 MG: 10 TABLET ORAL at 14:47

## 2025-07-23 RX ADMIN — GABAPENTIN 100 MG: 100 CAPSULE ORAL at 14:48

## 2025-07-23 RX ADMIN — ASPIRIN 81 MG 81 MG: 81 TABLET ORAL at 07:21

## 2025-07-23 RX ADMIN — PANTOPRAZOLE SODIUM 20 MG: 20 TABLET, DELAYED RELEASE ORAL at 20:28

## 2025-07-23 RX ADMIN — INSULIN LISPRO 1 UNITS: 100 INJECTION, SOLUTION INTRAVENOUS; SUBCUTANEOUS at 12:18

## 2025-07-23 RX ADMIN — METFORMIN HYDROCHLORIDE 500 MG: 500 TABLET ORAL at 16:37

## 2025-07-23 RX ADMIN — SENNOSIDES AND DOCUSATE SODIUM 1 TABLET: 8.6; 5 TABLET ORAL at 20:28

## 2025-07-23 RX ADMIN — HEPARIN SODIUM 5000 UNITS: 5000 INJECTION INTRAVENOUS; SUBCUTANEOUS at 20:29

## 2025-07-23 RX ADMIN — HEPARIN SODIUM 5000 UNITS: 5000 INJECTION INTRAVENOUS; SUBCUTANEOUS at 14:46

## 2025-07-23 RX ADMIN — POLYETHYLENE GLYCOL 3350 17 G: 17 POWDER, FOR SOLUTION ORAL at 07:22

## 2025-07-23 RX ADMIN — GABAPENTIN 100 MG: 100 CAPSULE ORAL at 07:21

## 2025-07-23 RX ADMIN — GABAPENTIN 100 MG: 100 CAPSULE ORAL at 20:28

## 2025-07-23 RX ADMIN — Medication 9 MG: at 20:28

## 2025-07-23 RX ADMIN — PANCRELIPASE LIPASE, PANCRELIPASE PROTEASE, PANCRELIPASE AMYLASE 5000 UNITS: 5000; 17000; 24000 CAPSULE, DELAYED RELEASE ORAL at 12:18

## 2025-07-23 RX ADMIN — PRAZOSIN HYDROCHLORIDE 1 MG: 1 CAPSULE ORAL at 20:36

## 2025-07-23 RX ADMIN — METHYLPHENIDATE HYDROCHLORIDE 5 MG: 10 TABLET ORAL at 07:22

## 2025-07-23 RX ADMIN — HEPARIN SODIUM 5000 UNITS: 5000 INJECTION INTRAVENOUS; SUBCUTANEOUS at 05:58

## 2025-07-23 RX ADMIN — OXYCODONE HYDROCHLORIDE 10 MG: 10 TABLET ORAL at 20:29

## 2025-07-23 RX ADMIN — METFORMIN HYDROCHLORIDE 500 MG: 500 TABLET ORAL at 07:22

## 2025-07-23 RX ADMIN — PANCRELIPASE LIPASE, PANCRELIPASE PROTEASE, PANCRELIPASE AMYLASE 5000 UNITS: 5000; 17000; 24000 CAPSULE, DELAYED RELEASE ORAL at 16:37

## 2025-07-23 RX ADMIN — SENNOSIDES AND DOCUSATE SODIUM 1 TABLET: 8.6; 5 TABLET ORAL at 07:22

## 2025-07-23 RX ADMIN — PANTOPRAZOLE SODIUM 20 MG: 20 TABLET, DELAYED RELEASE ORAL at 07:22

## 2025-07-23 RX ADMIN — INSULIN LISPRO 1 UNITS: 100 INJECTION, SOLUTION INTRAVENOUS; SUBCUTANEOUS at 20:28

## 2025-07-23 RX ADMIN — ATORVASTATIN CALCIUM 40 MG: 40 TABLET, FILM COATED ORAL at 20:28

## 2025-07-23 ASSESSMENT — PAIN DESCRIPTION - ORIENTATION: ORIENTATION: MID

## 2025-07-23 ASSESSMENT — PAIN DESCRIPTION - DESCRIPTORS: DESCRIPTORS: ACHING

## 2025-07-23 ASSESSMENT — 9 HOLE PEG TEST
TESTTIME_SECONDS: 37.55
TESTTIME_SECONDS: 31.99

## 2025-07-23 ASSESSMENT — PAIN DESCRIPTION - LOCATION: LOCATION: BACK

## 2025-07-23 ASSESSMENT — PAIN SCALES - GENERAL
PAINLEVEL_OUTOF10: 7
PAINLEVEL_OUTOF10: 5
PAINLEVEL_OUTOF10: 0

## 2025-07-23 ASSESSMENT — PAIN SCALES - WONG BAKER: WONGBAKER_NUMERICALRESPONSE: HURTS A LITTLE BIT

## 2025-07-23 NOTE — PLAN OF CARE
Problem: Chronic Conditions and Co-morbidities  Goal: Patient's chronic conditions and co-morbidity symptoms are monitored and maintained or improved  7/22/2025 2237 by Donna Solitario RN  Outcome: Progressing  Flowsheets (Taken 7/22/2025 0717 by Elida Garcia RN)  Care Plan - Patient's Chronic Conditions and Co-Morbidity Symptoms are Monitored and Maintained or Improved: Monitor and assess patient's chronic conditions and comorbid symptoms for stability, deterioration, or improvement     Problem: Discharge Planning  Goal: Discharge to home or other facility with appropriate resources  7/22/2025 2237 by Donna Solitario RN  Outcome: Progressing  Flowsheets (Taken 7/22/2025 0717 by Elida Garcia RN)  Discharge to home or other facility with appropriate resources: Identify barriers to discharge with patient and caregiver     Problem: Safety - Adult  Goal: Free from fall injury  7/22/2025 2237 by Donna Solitario RN  Outcome: Progressing  Flowsheets (Taken 7/22/2025 0800 by Elida Garcia RN)  Free From Fall Injury: Instruct family/caregiver on patient safety     Problem: Pain  Goal: Verbalizes/displays adequate comfort level or baseline comfort level  7/22/2025 2237 by Donna Solitario RN  Outcome: Progressing     Problem: Skin/Tissue Integrity  Goal: Skin integrity remains intact  Description: 1.  Monitor for areas of redness and/or skin breakdown  2.  Assess vascular access sites hourly  3.  Every 4-6 hours minimum:  Change oxygen saturation probe site  4.  Every 4-6 hours:  If on nasal continuous positive airway pressure, respiratory therapy assess nares and determine need for appliance change or resting period  7/22/2025 2237 by Donna Solitario RN  Outcome: Progressing  Flowsheets (Taken 7/22/2025 1952)  Skin Integrity Remains Intact: Monitor for areas of redness and/or skin breakdown     Problem: Skin/Tissue Integrity - Adult  Goal: Skin integrity remains intact  Description: 1.  Monitor for areas of

## 2025-07-23 NOTE — PROGRESS NOTES
Patient admitted to rehab with normal pressure hydrocephalus.  A/Ox4. Transfers with walker X1 &gait belt. Mobility restrictions: WBAt. On regular 4cc diet, tolerating well. Medications taken www. On heparin for DVT prophylaxis.  Skin: surgical incision to top of the head & abd, redness to buttocks and bilateral heels. Oxygen: RA. LDA: none. Has been continet of bowel and incontinent of bladder. LBM 7/20. Chair/bed alarms in use and call light in reach. Will monitor for safety.   Electronically signed by Donna Solitario RN on 7/22/2025 at 11:06 PM

## 2025-07-23 NOTE — PROGRESS NOTES
Department of Physical Medicine & Rehabilitation  Progress Note    Patient Identification:  Elio Almodovar  [id number]  : 1955  Admit date: 2025    Chief Complaint: Normal pressure hydrocephalus (HCC)    Subjective:   Seen in his room this morning working with therapy.  Patient did have loss of balance and cognitive decline yesterday.  UA was negative but today patient is feeling a little bit better.  He is able to participate in therapy a little better today.  CT head ordered for completeness.  Patient is agreeable to this plan.  Continues to work with therapy today.    ROS: No f/c, n/v, cp     Objective:  Patient Vitals for the past 24 hrs:   BP Temp Temp src Pulse Resp SpO2 Weight   25 0732 -- -- -- -- -- 98 % --   25 0719 (!) 149/65 97.7 °F (36.5 °C) Oral 50 16 98 % --   25 0457 -- -- -- -- -- -- 87.5 kg (192 lb 14.4 oz)   25 (!) 140/60 -- -- -- -- -- --   25 194 (!) 146/60 98.6 °F (37 °C) Oral 57 16 98 % --   25 1430 -- -- -- -- 16 -- --   25 1358 -- -- -- -- 16 -- --     Const: Alert. No distress, pleasant.   HEENT: Right side VPS sites well approximated with staples now removed. Normal sclera/conjunctiva. MMM.   CV: Regular rate and rhythm.   Resp: No respiratory distress. Lungs CTAB.   Abd: Soft, nontender, nondistended, NABS+   Ext: No edema.   Neuro: Alert, oriented, some impaired recall   Psych: Cooperative, appropriate mood and affect    Laboratory data: Available via EMR.   Last 24 hour lab  Recent Results (from the past 24 hours)   POCT Glucose    Collection Time: 25 11:03 AM   Result Value Ref Range    POC Glucose 165 (H) 70 - 99 mg/dl    Performed on ACCU-CHEK    Basic Metabolic Panel w/ Reflex to MG    Collection Time: 25  2:11 PM   Result Value Ref Range    Sodium 138 136 - 145 mmol/L    Potassium reflex Magnesium 4.3 3.5 - 5.1 mmol/L    Chloride 102 99 - 110 mmol/L    CO2 23 21 - 32 mmol/L    Anion Gap 13 3 - 16    Glucose 143

## 2025-07-23 NOTE — PROGRESS NOTES
Comprehensive Nutrition Assessment    Type and Reason for Visit:  Reassess    Nutrition Recommendations/Plan:   Continue current diet.   Glucerna with dinner.  Greek yogurt with BF.     Malnutrition Assessment:  Malnutrition Status:  At risk for malnutrition (07/17/25 1415)    Context:  Acute Illness     Findings of the 6 clinical characteristics of malnutrition:  Energy Intake:  Mild decrease in energy intake  Weight Loss:  1% to 2% over 1 week     Body Fat Loss:  No body fat loss     Muscle Mass Loss:  No muscle mass loss    Fluid Accumulation:  Mild (non-pitting; trace) Extremities   Strength:  Not Performed    Nutrition Assessment:    FU - Pt. continues on a 4 carb diet. Diet acceptance appears improved from last assessment. Glucerna offered with dinner, greek yogurt added to breakfast. 2% weight loss noted since last assessment, BMI 29. No new recommendations at this time. Pt. is scheduled for discharge tomorrow. Will continue to monitor nutritional adequacy while inpatient.    Nutrition Related Findings:    7/22 Nutrition labs reviewed; most recent BG Wound Type: Multiple, Surgical Incision       Current Nutrition Intake & Therapies:    Average Meal Intake: %  Average Supplements Intake: Unable to assess  ADULT DIET; Regular; 4 carb choices (60 gm/meal); Safety Tray; Safety Tray (Disposables)  ADULT ORAL NUTRITION SUPPLEMENT; Dinner; Diabetic Oral Supplement  ADULT ORAL NUTRITION SUPPLEMENT; Breakfast; Other Oral Supplement; Greek yogurt    Anthropometric Measures:  Height: 172.7 cm (5' 7.99\")  Ideal Body Weight (IBW): 154 lbs (70 kg)       Current Body Weight: 87.5 kg (192 lb 14.4 oz), 128.1 % IBW. Weight Source: Bed scale  Current BMI (kg/m2): 29.3  Usual Body Weight: 89.5 kg (197 lb 5 oz)     % Weight Change (Calculated): -2.2  Weight Adjustment For: No Adjustment                 BMI Categories: Overweight (BMI 25.0-29.9)    Estimated Daily Nutrient Needs:  Energy Requirements Based On:

## 2025-07-23 NOTE — PLAN OF CARE
Problem: ABCDS Injury Assessment  Goal: Absence of physical injury  Recent Flowsheet Documentation  Taken 7/23/2025 0719 by Elida Garcia, RN  Absence of Physical Injury: Implement safety measures based on patient assessment     Problem: Skin/Tissue Integrity - Adult  Goal: Skin integrity remains intact  Description: 1.  Monitor for areas of redness and/or skin breakdown  2.  Assess vascular access sites hourly  3.  Every 4-6 hours minimum:  Change oxygen saturation probe site  4.  Every 4-6 hours:  If on nasal continuous positive airway pressure, respiratory therapy assess nares and determine need for appliance change or resting period  7/23/2025 1045 by Elida Garcia, RN  Outcome: Progressing  Flowsheets (Taken 7/23/2025 0719)  Skin Integrity Remains Intact: Monitor for areas of redness and/or skin breakdown

## 2025-07-23 NOTE — PROGRESS NOTES
Occupational Therapy  Facility/Department: [address] REHAB  Rehabilitation Occupational Therapy Daily Am and PM Treatment Note  Discharge    Date: 25  Patient Name: Elio Almodovar       Room: M3T-2516/3263-01  MRN: [id number]  Account: [id number]   : 1955  (70 y.o.) Gender: male           Additional Pertinent Hx: Per Dr. Tavarez H&P \"Patient is a 69 yo M with pmh HTN, DM2, prostate cancer, depression, h/o etoh abuse, and recently diagnosed normal pressure hydrocephalus who initially presented to the [facility] on 2025 for placement of right  shunt (with Dr. Diaz). Post op course was notable for acute ischemic stroke in the right frontal lobe along the approach of VPS catheter. Neurology consulted, stroke possibly from local arterial compression. Plan is to start ASA 81 mg 14 days post-op. Course was also complicated by depression with suicidal ideation. Telehealth Psychiatry consult was completed. Recommendation is for outpatient Psych follow-up.  Patient now presents to ARU with impaired mobility, self-care, and cognition below his baseline.\"        Past Medical History:  has a past medical history of Cancer (HCC), Diabetes mellitus (HCC), History of alcohol abuse, History of blood transfusion, History of pancreatitis, Hypertension, and NPH (normal pressure hydrocephalus) (HCC).  Past Surgical History:   has a past surgical history that includes Prostatectomy; Appendectomy; Tonsillectomy; and Ventriculoperitoneal shunt (Right, 2025).    Restrictions  Restrictions/Precautions: Fall Risk  Other Position/Activity Restrictions: Elevate HOB 30 degrees; activity as tolerated    Subjective  Subjective: Pt met in dept, reports no pain and agreeable to OT treat.  Restrictions/Precautions: Fall Risk      Objective    ADL  Toilet Transfers  Equipment: [employer] safety frame  Additional Factors: Increased time to complete  Assistance Level: Stand by assist  Skilled Clinical Factors: RW >< TSF,

## 2025-07-23 NOTE — PROGRESS NOTES
Patient admitted to rehab with normal pressure hydrocephalus.  A/Ox4. Transfers with walker. Mobility restrictions: WBAT. On regular 4CC diet, tolerating well. Medications taken whole with thins. On heparin for DVT prophylaxis.  Skin: surgical incision to the top of the head and abdomen, blanchable redness to heels and buttocks, MASD to gluteal slit. Oxygen: RA. LDA: NONE. Has been continent of bowel and incontinent of bladder. LBM 7/20, glycolax given. Chair/bed alarms in use and call light in reach.

## 2025-07-23 NOTE — PROGRESS NOTES
Secured message sent to Dr. Sims asking for discharge order, he responded Dr. Tavarez will put it in the morning.

## 2025-07-23 NOTE — PROGRESS NOTES
ACUTE REHAB UNIT  SPEECH/LANGUAGE PATHOLOGY      [x] Daily  [] Weekly Care Conference Note  [x] Discharge    Patient:Elio Almodovar      :1955  MRN:[id number]  Rehab Dx/Hx: Normal pressure hydrocephalus (HCC) [G91.2]   CT head 2025: IMPRESSION: 1. Stable head CT.  CT Head 2025: IMPRESSION: 1.  Unchanged size and configuration of the ventricles with unchanged positioning of right  shunt catheter.2.  Trace layering intraventricular hemorrhage in the left temporal horn, new/increased from prior.3.  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  CTA Head Neck 2025: IMPRESSION:No flow significant stenosis within the head or neck.  XR Shunt series 2024: IMPRESSION:1. The orientation is reversed, findings will be discussed with referring physician. IMPRESSION:1. The orientation is reversed, findings will be discussed with referring physician  MRI Brain: 7/3/2025: IMPRESSION:Acute zone of infarction in the right anterior frontal lobe.Cardiomegaly, with ventriculostomy catheter in place.  No chest XR this admit encounter. Last chest XR on available chart review   Onset: 7/3/2025 Date of Admit: 2025  Room #: V1M-2957/3263-01    Precautions: falls, seizures, and post shunt precautions  Home situation: Lives with family; assist from family   Baseline function:  retired ; history of SLP tx in  for SLP/cognitive -linguistic ; otherwise reportedly was IND in basic DL situations and participate with family in some adv DL.Did not drive. Will need to confirmation of history by family  Baseline Diet: regular  ST Dx:Cognitive-Linguistic Deficits   Initial Speech Therapy Assessment Diagnosis:   1. Cognitive Diagnosis: Testing revealed deficits in attention; working memory and STM; and numeric reasoning. Ongoing assess of complex PS/reasoning and tx trial for temporal orientation and memory incorporating use of memory strategies. Will need to get confirmed baseline function. Pt with history

## 2025-07-23 NOTE — PROGRESS NOTES
Physical Therapy  Facility/Department: [address] REHAB  Rehabilitation Physical Therapy Treatment Note and D/C Summary    NAME: Elio Almodovar  : 1955 (70 y.o.)  MRN: [id number]  CODE STATUS: Full Code    Date of Service: 25       Restrictions:  Restrictions/Precautions: Fall Risk  Position Activity Restriction  Other Position/Activity Restrictions: Elevate HOB 30 degrees; activity as tolerated     SUBJECTIVE  Subjective: Pt met in recliner in room, reporting need to use restroom. Pt noted to be incontinent of bladder.  Pain: Denies pain.       OBJECTIVE  Cognition  Overall Cognitive Status: Exceptions  Arousal/Alertness: Appropriate responses to stimuli  Following Commands: Follows one step commands with repetition;Follows one step commands with increased time  Memory: Appears intact  Safety Judgement: Decreased awareness of need for assistance;Decreased awareness of need for safety  Problem Solving: Assistance required to identify errors made;Assistance required to generate solutions;Assistance required to implement solutions  Insights: Decreased awareness of deficits  Initiation: Requires cues for some  Cognition Comment: Delayed processing and distractible, slow motor planning  Orientation  Overall Orientation Status: Within Functional Limits    Functional Mobility  Standing Balance  Activity: toileting task - pt doffed brief and shorts SBA, sat on commode to void BM (noted to be incontinent of bladder), PT assisted in donned new brief and shorts in sitting, pt stood at RW SBA to manage brief and shorts  Transfers  Surface: To chair with arms;From chair with arms;Wheelchair;Standard toilet  Additional Factors: Set-up;Verbal cues;Increased time to complete  Device: Walker (RW)  Sit to Stand  Assistance Level: Stand by assist  Stand to Sit  Assistance Level: Stand by assist      Environmental Mobility  Ambulation  Surface: Level surface  Device: Rolling walker  Distance: 50' x 2, 150'  Activity:

## 2025-07-24 VITALS
TEMPERATURE: 97.3 F | SYSTOLIC BLOOD PRESSURE: 139 MMHG | BODY MASS INDEX: 30.2 KG/M2 | RESPIRATION RATE: 18 BRPM | OXYGEN SATURATION: 98 % | HEIGHT: 68 IN | DIASTOLIC BLOOD PRESSURE: 68 MMHG | WEIGHT: 199.3 LBS | HEART RATE: 53 BPM

## 2025-07-24 LAB
ANION GAP SERPL CALCULATED.3IONS-SCNC: 10 MMOL/L (ref 3–16)
BASOPHILS # BLD: 0 K/UL (ref 0–0.2)
BASOPHILS NFR BLD: 0.7 %
BUN SERPL-MCNC: 18 MG/DL (ref 7–20)
CALCIUM SERPL-MCNC: 9.5 MG/DL (ref 8.3–10.6)
CHLORIDE SERPL-SCNC: 102 MMOL/L (ref 99–110)
CO2 SERPL-SCNC: 25 MMOL/L (ref 21–32)
CREAT SERPL-MCNC: 1 MG/DL (ref 0.8–1.3)
DEPRECATED RDW RBC AUTO: 14.6 % (ref 12.4–15.4)
EOSINOPHIL # BLD: 0.2 K/UL (ref 0–0.6)
EOSINOPHIL NFR BLD: 3.3 %
GFR SERPLBLD CREATININE-BSD FMLA CKD-EPI: 80 ML/MIN/{1.73_M2}
GLUCOSE BLD-MCNC: 164 MG/DL (ref 70–99)
GLUCOSE SERPL-MCNC: 160 MG/DL (ref 70–99)
HCT VFR BLD AUTO: 36.4 % (ref 40.5–52.5)
HGB BLD-MCNC: 12.2 G/DL (ref 13.5–17.5)
LYMPHOCYTES # BLD: 2.8 K/UL (ref 1–5.1)
LYMPHOCYTES NFR BLD: 38.5 %
MCH RBC QN AUTO: 30.6 PG (ref 26–34)
MCHC RBC AUTO-ENTMCNC: 33.6 G/DL (ref 31–36)
MCV RBC AUTO: 91.1 FL (ref 80–100)
MONOCYTES # BLD: 0.8 K/UL (ref 0–1.3)
MONOCYTES NFR BLD: 10.8 %
NEUTROPHILS # BLD: 3.4 K/UL (ref 1.7–7.7)
NEUTROPHILS NFR BLD: 46.7 %
PERFORMED ON: ABNORMAL
PLATELET # BLD AUTO: 144 K/UL (ref 135–450)
PMV BLD AUTO: 9.1 FL (ref 5–10.5)
POTASSIUM SERPL-SCNC: 4 MMOL/L (ref 3.5–5.1)
RBC # BLD AUTO: 3.99 M/UL (ref 4.2–5.9)
SODIUM SERPL-SCNC: 137 MMOL/L (ref 136–145)
WBC # BLD AUTO: 7.4 K/UL (ref 4–11)

## 2025-07-24 PROCEDURE — 6370000000 HC RX 637 (ALT 250 FOR IP): Performed by: PHYSICAL MEDICINE & REHABILITATION

## 2025-07-24 PROCEDURE — 94760 N-INVAS EAR/PLS OXIMETRY 1: CPT

## 2025-07-24 PROCEDURE — 80048 BASIC METABOLIC PNL TOTAL CA: CPT

## 2025-07-24 PROCEDURE — 36415 COLL VENOUS BLD VENIPUNCTURE: CPT

## 2025-07-24 PROCEDURE — 85025 COMPLETE CBC W/AUTO DIFF WBC: CPT

## 2025-07-24 PROCEDURE — 6360000002 HC RX W HCPCS: Performed by: PHYSICAL MEDICINE & REHABILITATION

## 2025-07-24 RX ORDER — OXYCODONE HYDROCHLORIDE 5 MG/1
5-10 TABLET ORAL EVERY 12 HOURS PRN
Qty: 20 TABLET | Refills: 0 | Status: SHIPPED | OUTPATIENT
Start: 2025-07-24 | End: 2025-07-24

## 2025-07-24 RX ORDER — GABAPENTIN 100 MG/1
100 CAPSULE ORAL 3 TIMES DAILY
Qty: 90 CAPSULE | Refills: 0 | Status: SHIPPED | OUTPATIENT
Start: 2025-07-24 | End: 2025-07-24

## 2025-07-24 RX ORDER — PANTOPRAZOLE SODIUM 20 MG/1
20 TABLET, DELAYED RELEASE ORAL 2 TIMES DAILY
Qty: 60 TABLET | Refills: 0 | Status: SHIPPED | OUTPATIENT
Start: 2025-07-24

## 2025-07-24 RX ORDER — ASPIRIN 81 MG/1
81 TABLET, CHEWABLE ORAL DAILY
Qty: 30 TABLET | Refills: 0 | Status: SHIPPED | OUTPATIENT
Start: 2025-07-25

## 2025-07-24 RX ORDER — GABAPENTIN 100 MG/1
100 CAPSULE ORAL 3 TIMES DAILY
Qty: 90 CAPSULE | Refills: 0 | Status: SHIPPED | OUTPATIENT
Start: 2025-07-24 | End: 2025-08-23

## 2025-07-24 RX ORDER — OXYCODONE HYDROCHLORIDE 5 MG/1
5-10 TABLET ORAL EVERY 12 HOURS PRN
Qty: 20 TABLET | Refills: 0 | Status: SHIPPED | OUTPATIENT
Start: 2025-07-24 | End: 2025-07-29

## 2025-07-24 RX ORDER — PRAZOSIN HYDROCHLORIDE 1 MG/1
1 CAPSULE ORAL NIGHTLY
Qty: 30 CAPSULE | Refills: 0 | Status: SHIPPED | OUTPATIENT
Start: 2025-07-24

## 2025-07-24 RX ORDER — ATORVASTATIN CALCIUM 40 MG/1
40 TABLET, FILM COATED ORAL NIGHTLY
Qty: 30 TABLET | Refills: 0 | Status: SHIPPED | OUTPATIENT
Start: 2025-07-24

## 2025-07-24 RX ORDER — ASPIRIN 81 MG/1
81 TABLET, CHEWABLE ORAL DAILY
Qty: 30 TABLET | Refills: 0 | Status: SHIPPED | OUTPATIENT
Start: 2025-07-25 | End: 2025-07-24

## 2025-07-24 RX ORDER — METHYLPHENIDATE HYDROCHLORIDE 5 MG/1
5 TABLET ORAL 2 TIMES DAILY
Qty: 60 TABLET | Refills: 0 | Status: SHIPPED | OUTPATIENT
Start: 2025-07-24 | End: 2025-08-23

## 2025-07-24 RX ADMIN — METFORMIN HYDROCHLORIDE 500 MG: 500 TABLET ORAL at 08:01

## 2025-07-24 RX ADMIN — ASPIRIN 81 MG 81 MG: 81 TABLET ORAL at 08:02

## 2025-07-24 RX ADMIN — ANTACID TABLETS 500 MG: 500 TABLET, CHEWABLE ORAL at 08:02

## 2025-07-24 RX ADMIN — GABAPENTIN 100 MG: 100 CAPSULE ORAL at 08:01

## 2025-07-24 RX ADMIN — METHYLPHENIDATE HYDROCHLORIDE 5 MG: 10 TABLET ORAL at 08:02

## 2025-07-24 RX ADMIN — HEPARIN SODIUM 5000 UNITS: 5000 INJECTION INTRAVENOUS; SUBCUTANEOUS at 05:19

## 2025-07-24 RX ADMIN — PANTOPRAZOLE SODIUM 20 MG: 20 TABLET, DELAYED RELEASE ORAL at 08:01

## 2025-07-24 RX ADMIN — SENNOSIDES AND DOCUSATE SODIUM 1 TABLET: 8.6; 5 TABLET ORAL at 08:01

## 2025-07-24 RX ADMIN — FLUOXETINE HYDROCHLORIDE 20 MG: 20 CAPSULE ORAL at 08:01

## 2025-07-24 RX ADMIN — PANCRELIPASE LIPASE, PANCRELIPASE PROTEASE, PANCRELIPASE AMYLASE 5000 UNITS: 5000; 17000; 24000 CAPSULE, DELAYED RELEASE ORAL at 08:01

## 2025-07-24 ASSESSMENT — PAIN SCALES - GENERAL: PAINLEVEL_OUTOF10: 0

## 2025-07-24 NOTE — DISCHARGE SUMMARY
Physical Medicine & Rehabilitation  Discharge Summary     Patient Identification:  Elio Almodovar  : 1955  Admit date: 2025  Discharge date:  2025  Attending provider: Elida Tavarez MD        Primary care provider: Cecil Smith MD     Discharge Diagnoses:   Patient Active Problem List   Diagnosis    Hydrocephalus (HCC)    Hypertensive emergency    Normal pressure hydrocephalus (HCC)    Depression with suicidal ideation    S/P  shunt    Cerebrovascular accident (CVA) (HCC)       History of Present Illness/Acute Hospital Course:  Patient is a 71 yo M with pmh HTN, DM2, prostate cancer, depression, h/o etoh abuse, and recently diagnosed normal pressure hydrocephalus who initially presented to the [facility] on 2025 for placement of right  shunt (with Dr. Diaz). Post op course was notable for acute ischemic stroke in the right frontal lobe along the approach of VPS catheter. Neurology consulted, stroke possibly from local arterial compression. Plan is to start ASA 81 mg 14 days post-op. Course was also complicated by depression with suicidal ideation. Telehealth Psychiatry consult was completed. Recommendation is for outpatient Psych follow-up.  Patient now presents to ARU with impaired mobility, self-care, and cognition below his baseline.     Inpatient Rehabilitation Course:   Elio Almodovar is a 70 y.o. male admitted to inpatient rehabilitation on 2025 with Normal pressure hydrocephalus (HCC).  The patient participated in an aggressive multidisciplinary inpatient rehabilitation program involving 3 hours of therapy per day, at least 5 days per week. He made good progress with regard to balance, cognition, strength, endurance, compensatory strategies. Remains limited by balance deficits and higher level cognition. Now overall SBA for mobility and SBA-Ramone for ADLs. Note, dc was postponed one day due to functional decline on . Labs were stable, UA negative, and CT

## 2025-07-24 NOTE — PLAN OF CARE
Problem: Chronic Conditions and Co-morbidities  Goal: Patient's chronic conditions and co-morbidity symptoms are monitored and maintained or improved  7/24/2025 1010 by Curtis Mann RN  Outcome: Completed  Flowsheets (Taken 7/23/2025 2028 by Codie Storm RN)  Care Plan - Patient's Chronic Conditions and Co-Morbidity Symptoms are Monitored and Maintained or Improved: Monitor and assess patient's chronic conditions and comorbid symptoms for stability, deterioration, or improvement  7/24/2025 0000 by Codie Storm RN  Outcome: Progressing  Flowsheets (Taken 7/23/2025 2028)  Care Plan - Patient's Chronic Conditions and Co-Morbidity Symptoms are Monitored and Maintained or Improved: Monitor and assess patient's chronic conditions and comorbid symptoms for stability, deterioration, or improvement     Problem: Discharge Planning  Goal: Discharge to home or other facility with appropriate resources  7/24/2025 1010 by Curtis Mann RN  Outcome: Completed  Flowsheets (Taken 7/23/2025 2028 by Codie Storm, RN)  Discharge to home or other facility with appropriate resources: Identify barriers to discharge with patient and caregiver  7/24/2025 0000 by Codie Storm RN  Outcome: Progressing  Flowsheets (Taken 7/23/2025 2028)  Discharge to home or other facility with appropriate resources: Identify barriers to discharge with patient and caregiver     Problem: Safety - Adult  Goal: Free from fall injury  7/24/2025 1010 by Curtis Mann RN  Outcome: Completed  Flowsheets (Taken 7/23/2025 2359 by Codie Storm, RN)  Free From Fall Injury: Instruct family/caregiver on patient safety  7/24/2025 0000 by Codie Storm RN  Outcome: Progressing  Flowsheets (Taken 7/23/2025 2359)  Free From Fall Injury: Instruct family/caregiver on patient safety     Problem: Pain  Goal: Verbalizes/displays adequate comfort level or baseline comfort level  7/24/2025 1010 by Curtis Mann RN  Outcome: Completed  Flowsheets

## 2025-07-24 NOTE — DISCHARGE INSTR - DIET

## 2025-07-24 NOTE — PROGRESS NOTES
Patient admitted to rehab with corrected hydrocephalus.  A/Ox4. Transfers with walker. Mobility restrictions: WBAT. On regular 4CC diet, tolerating well. Medications taken whole with thins. On heparin for DVT prophylaxis.  Skin: surgical incision to the top of the head and abdomen, blanchable redness to heels and buttocks, MASD to gluteal slit. Oxygen: RA. LDA: NONE. Has been continent of bowel and incontinent of bladder. LBM 7/20, glycolax given. Chair/bed alarms in use and call light in reach.

## 2025-07-24 NOTE — PROGRESS NOTES
Patient has been discharged per MD orders. Patient verbalizes understanding of all instructions, medications, and follow up.All belongings have been gathered and packed. Wife picked up the medications in the outpatient pharmacy and was instructed to get the rest in the designated [facility] pharmacy. Patient transferred safely to their private vehicle.

## 2025-07-24 NOTE — PLAN OF CARE
Problem: Chronic Conditions and Co-morbidities  Goal: Patient's chronic conditions and co-morbidity symptoms are monitored and maintained or improved  7/24/2025 0000 by Codie Storm RN  Outcome: Progressing  Flowsheets (Taken 7/23/2025 2028)  Care Plan - Patient's Chronic Conditions and Co-Morbidity Symptoms are Monitored and Maintained or Improved: Monitor and assess patient's chronic conditions and comorbid symptoms for stability, deterioration, or improvement  7/23/2025 1045 by Elida Garcia RN  Outcome: Progressing  Flowsheets (Taken 7/23/2025 0719)  Care Plan - Patient's Chronic Conditions and Co-Morbidity Symptoms are Monitored and Maintained or Improved: Monitor and assess patient's chronic conditions and comorbid symptoms for stability, deterioration, or improvement     Problem: Discharge Planning  Goal: Discharge to home or other facility with appropriate resources  7/24/2025 0000 by Codie Storm RN  Outcome: Progressing  Flowsheets (Taken 7/23/2025 2028)  Discharge to home or other facility with appropriate resources: Identify barriers to discharge with patient and caregiver  7/23/2025 1045 by Elida Garcia RN  Outcome: Progressing  Flowsheets (Taken 7/23/2025 0719)  Discharge to home or other facility with appropriate resources: Identify barriers to discharge with patient and caregiver     Problem: Safety - Adult  Goal: Free from fall injury  7/24/2025 0000 by Codie Storm RN  Outcome: Progressing  Flowsheets (Taken 7/23/2025 2359)  Free From Fall Injury: Instruct family/caregiver on patient safety  7/23/2025 1045 by Elida Garcia RN  Outcome: Progressing  Flowsheets (Taken 7/23/2025 0719)  Free From Fall Injury: Instruct family/caregiver on patient safety     Problem: Pain  Goal: Verbalizes/displays adequate comfort level or baseline comfort level  7/24/2025 0000 by Codie Storm RN  Outcome: Progressing  Flowsheets (Taken 7/23/2025 1930)  Verbalizes/displays adequate

## 2025-07-24 NOTE — PROGRESS NOTES
Patient admitted to rehab with normal pressure hydrocephalus.  A/Ox4. Transfers with walker x1. Mobility restrictions: WBAT. On regular 4CC diet, tolerating well. Medications taken whole orally. On heparin for DVT prophylaxis.  Skin: surgical incision on scalp-GARRETT and surgical incision on abdomen with surgical glue intact, blanchable redness on buttocks. Oxygen: on room air. Has been continent of bowel and incontinent of bladder. LBM 07/23/2025. Chair/bed alarms in use and call light in reach.   Electronically signed by Curtis Mann RN on 7/24/2025 at 8:24 AM

## 2025-07-24 NOTE — PROGRESS NOTES
Provision of Current Reconciled Medication List to Subsequent Provider at Discharge  []No, current reconciled medication list not provided to the subsequent provider.  [x]Yes, current reconciled medication list provided to the subsequent provider. (**Select route of transmission below**)   [] Via Electronic Health Record   []Via Health Information Exchange Organization  [] Verbal (e.g. in person, telephone, video conferencing)  [x]Paper-based (e.g. fax, copies, printouts)   []Other Methods (e.g. texting, email, CDs)    Provision of Current Reconciled Medication List to Patient at Discharge  []No, current reconciled medication list not provided to the patient, family and/or caregiver.   [x]Yes, current reconciled medication list provided to the patient, family and/or caregiver.  (**Select route of transmission below**)   [] Via Electronic Health Record (e.g., electronic access to patient portal)   [] Via Health Information Exchange Organization  [] Verbal (e.g. in person, telephone, video conferencing)  [x]Paper-based (e.g. fax, copies, printouts)   []Other Methods (e.g. texting, email, CDs)    High Risk Drug Classes:  Use and Indication    Is taking: Check if the pt is taking any medications by pharmacological classification, not how it is used, in the following classes  Indication noted: If column 1 is checked, check if there is an indication noted for all meds in the drug class Is taking  (check all that apply) Indication noted (check all that apply)   Antipsychotic [x] []   Anticoagulant [x] []   Antibiotic [] []   Opioid [x] []   Antiplatelet [] []   Hypoglycemic (including insulin) [x] []   None of the above []     Special Treatments, Procedures, and Programs    Check all of the following treatments, procedures, and programs that apply at discharge. At Discharge (check all that apply)   Cancer Treatments   A1. Chemotherapy []           A2. IV []           A3. Oral []           A10. Other []   B1. Radiation []

## 2025-07-24 NOTE — CARE COORDINATION
Met with pt and wife to review DC.  IMM letter presented to them.  Wife in room to transport home.  [facility] to service the home care.    GINGER Cruz     Case Management   [phone]    7/24/2025  11:54 AM

## 2025-07-24 NOTE — PROGRESS NOTES
CLINICAL PHARMACY NOTE: MEDS TO BEDS    Total # of Prescriptions Filled: 2   The following medications were delivered to the patient:  Current Discharge Medication List        START taking these medications    Details   aspirin 81 MG chewable tablet Take 1 tablet by mouth daily  Qty: 30 tablet, Refills: 0         prazosin 1 MG capsule       Additional Documentation: Picked up by wife

## 2025-08-15 ENCOUNTER — HOSPITAL ENCOUNTER (EMERGENCY)
Age: 70
Discharge: HOME | End: 2025-08-15
Attending: EMERGENCY MEDICINE
Payer: MEDICARE

## 2025-08-15 ENCOUNTER — APPOINTMENT (OUTPATIENT)
Dept: CT IMAGING | Age: 70
End: 2025-08-15
Payer: MEDICARE

## 2025-08-15 VITALS
RESPIRATION RATE: 17 BRPM | SYSTOLIC BLOOD PRESSURE: 124 MMHG | HEIGHT: 68 IN | WEIGHT: 195.77 LBS | BODY MASS INDEX: 29.67 KG/M2 | TEMPERATURE: 98.2 F | HEART RATE: 61 BPM | DIASTOLIC BLOOD PRESSURE: 83 MMHG | OXYGEN SATURATION: 95 %

## 2025-08-15 DIAGNOSIS — R73.9 HYPERGLYCEMIA: Primary | ICD-10-CM

## 2025-08-15 DIAGNOSIS — E86.0 DEHYDRATION: ICD-10-CM

## 2025-08-15 DIAGNOSIS — R45.1 AGITATED: ICD-10-CM

## 2025-08-15 LAB
ALBUMIN SERPL-MCNC: 4.2 G/DL (ref 3.4–5)
ALBUMIN/GLOB SERPL: 1.5 {RATIO} (ref 1.1–2.2)
ALP SERPL-CCNC: 160 U/L (ref 40–129)
ALT SERPL-CCNC: 18 U/L (ref 10–40)
ANION GAP SERPL CALCULATED.3IONS-SCNC: 17 MMOL/L (ref 3–16)
AST SERPL-CCNC: 31 U/L (ref 15–37)
BASE EXCESS BLDV CALC-SCNC: -0.9 MMOL/L
BASOPHILS # BLD: 0 K/UL (ref 0–0.2)
BASOPHILS NFR BLD: 0.5 %
BILIRUB SERPL-MCNC: 0.5 MG/DL (ref 0–1)
BUN SERPL-MCNC: 23 MG/DL (ref 7–20)
CALCIUM SERPL-MCNC: 9.7 MG/DL (ref 8.3–10.6)
CHLORIDE SERPL-SCNC: 92 MMOL/L (ref 99–110)
CO2 BLDV-SCNC: 25 MMOL/L
CO2 SERPL-SCNC: 21 MMOL/L (ref 21–32)
COHGB MFR BLDV: 0.1 %
CREAT SERPL-MCNC: 1.3 MG/DL (ref 0.8–1.3)
DEPRECATED RDW RBC AUTO: 13.6 % (ref 12.4–15.4)
EOSINOPHIL # BLD: 0.1 K/UL (ref 0–0.6)
EOSINOPHIL NFR BLD: 0.7 %
ETHANOLAMINE SERPL-MCNC: NORMAL MG/DL (ref 0–0.08)
GFR SERPLBLD CREATININE-BSD FMLA CKD-EPI: 59 ML/MIN/{1.73_M2}
GLUCOSE BLD-MCNC: 387 MG/DL (ref 70–99)
GLUCOSE BLD-MCNC: 457 MG/DL (ref 70–99)
GLUCOSE BLD-MCNC: 523 MG/DL
GLUCOSE BLD-MCNC: 523 MG/DL (ref 70–99)
GLUCOSE BLD-MCNC: 559 MG/DL
GLUCOSE BLD-MCNC: 559 MG/DL (ref 70–99)
GLUCOSE SERPL-MCNC: 596 MG/DL (ref 70–99)
HCO3 BLDV-SCNC: 24 MMOL/L (ref 23–29)
HCT VFR BLD AUTO: 39.3 % (ref 40.5–52.5)
HGB BLD-MCNC: 13.2 G/DL (ref 13.5–17.5)
LYMPHOCYTES # BLD: 1.7 K/UL (ref 1–5.1)
LYMPHOCYTES NFR BLD: 16.6 %
MCH RBC QN AUTO: 30.6 PG (ref 26–34)
MCHC RBC AUTO-ENTMCNC: 33.6 G/DL (ref 31–36)
MCV RBC AUTO: 91.1 FL (ref 80–100)
METHGB MFR BLDV: 1.1 %
MONOCYTES # BLD: 0.9 K/UL (ref 0–1.3)
MONOCYTES NFR BLD: 8.3 %
NEUTROPHILS # BLD: 7.6 K/UL (ref 1.7–7.7)
NEUTROPHILS NFR BLD: 73.9 %
O2 THERAPY: ABNORMAL
PCO2 BLDV: 39.4 MMHG (ref 40–50)
PERFORMED ON: ABNORMAL
PH BLDV: 7.39 [PH] (ref 7.35–7.45)
PLATELET # BLD AUTO: 160 K/UL (ref 135–450)
PMV BLD AUTO: 9.5 FL (ref 5–10.5)
PO2 BLDV: 128 MMHG
POTASSIUM SERPL-SCNC: 5.2 MMOL/L (ref 3.5–5.1)
PROT SERPL-MCNC: 7 G/DL (ref 6.4–8.2)
RBC # BLD AUTO: 4.32 M/UL (ref 4.2–5.9)
REASON FOR REJECTION: NORMAL
REJECTED TEST: NORMAL
SAO2 % BLDV: 97 %
SODIUM SERPL-SCNC: 130 MMOL/L (ref 136–145)
WBC # BLD AUTO: 10.2 K/UL (ref 4–11)

## 2025-08-15 PROCEDURE — 82077 ASSAY SPEC XCP UR&BREATH IA: CPT

## 2025-08-15 PROCEDURE — 82803 BLOOD GASES ANY COMBINATION: CPT

## 2025-08-15 PROCEDURE — 6360000002 HC RX W HCPCS: Performed by: EMERGENCY MEDICINE

## 2025-08-15 PROCEDURE — 85025 COMPLETE CBC W/AUTO DIFF WBC: CPT

## 2025-08-15 PROCEDURE — 99284 EMERGENCY DEPT VISIT MOD MDM: CPT

## 2025-08-15 PROCEDURE — 96374 THER/PROPH/DIAG INJ IV PUSH: CPT

## 2025-08-15 PROCEDURE — 70450 CT HEAD/BRAIN W/O DYE: CPT

## 2025-08-15 PROCEDURE — 96361 HYDRATE IV INFUSION ADD-ON: CPT

## 2025-08-15 PROCEDURE — 6370000000 HC RX 637 (ALT 250 FOR IP): Performed by: EMERGENCY MEDICINE

## 2025-08-15 PROCEDURE — 36415 COLL VENOUS BLD VENIPUNCTURE: CPT

## 2025-08-15 PROCEDURE — 2580000003 HC RX 258: Performed by: EMERGENCY MEDICINE

## 2025-08-15 PROCEDURE — 80053 COMPREHEN METABOLIC PANEL: CPT

## 2025-08-15 RX ORDER — 0.9 % SODIUM CHLORIDE 0.9 %
1000 INTRAVENOUS SOLUTION INTRAVENOUS ONCE
Status: COMPLETED | OUTPATIENT
Start: 2025-08-15 | End: 2025-08-15

## 2025-08-15 RX ORDER — LORAZEPAM 1 MG/1
1 TABLET ORAL ONCE
Status: COMPLETED | OUTPATIENT
Start: 2025-08-15 | End: 2025-08-15

## 2025-08-15 RX ORDER — DIPHENHYDRAMINE HYDROCHLORIDE 50 MG/ML
25 INJECTION, SOLUTION INTRAMUSCULAR; INTRAVENOUS ONCE
Status: COMPLETED | OUTPATIENT
Start: 2025-08-15 | End: 2025-08-15

## 2025-08-15 RX ADMIN — LORAZEPAM 1 MG: 1 TABLET ORAL at 20:36

## 2025-08-15 RX ADMIN — SODIUM CHLORIDE 1000 ML: 0.9 INJECTION, SOLUTION INTRAVENOUS at 18:44

## 2025-08-15 RX ADMIN — DIPHENHYDRAMINE HYDROCHLORIDE 25 MG: 50 INJECTION INTRAMUSCULAR; INTRAVENOUS at 16:47

## (undated) DEVICE — [HIGH FLOW INSUFFLATOR,  DO NOT USE IF PACKAGE IS DAMAGED,  KEEP DRY,  KEEP AWAY FROM SUNLIGHT,  PROTECT FROM HEAT AND RADIOACTIVE SOURCES.]: Brand: PNEUMOSURE

## (undated) DEVICE — SHEET, ORTHO, SPLIT, STERILE: Brand: MEDLINE

## (undated) DEVICE — LIQUIBAND RAPID ADHESIVE 36/CS 0.8ML: Brand: MEDLINE

## (undated) DEVICE — GLOVE SURG SZ 65 CRM LTX FREE POLYISOPRENE POLYMER BEAD ANTI

## (undated) DEVICE — UNDERGLOVE SURG SZ 8 BLU LTX FREE SYN POLYISOPRENE POLYMER

## (undated) DEVICE — SOLUTION ANTIFOG VIS SYS CLEARIFY LAPSCP

## (undated) DEVICE — BOOT,SUTURE,STANDARD,YELLOW-IN-BLUE: Brand: MEDLINE

## (undated) DEVICE — TROCAR: Brand: KII FIOS FIRST ENTRY

## (undated) DEVICE — SUTURE VICRYL SZ 2-0 L18IN ABSRB UD CT-1 L36MM 1/2 CIR J839D

## (undated) DEVICE — GLOVE SURG SZ 65 L12IN FNGR THK79MIL GRN LTX FREE

## (undated) DEVICE — DECANTER BAG 9": Brand: MEDLINE INDUSTRIES, INC.

## (undated) DEVICE — CRANI: Brand: MEDLINE INDUSTRIES, INC.

## (undated) DEVICE — GLOVE SURG SZ 7 CRM LTX FREE POLYISOPRENE POLYMER BEAD ANTI

## (undated) DEVICE — 3M™ IOBAN™ 2 ANTIMICROBIAL INCISE DRAPE 6651EZ: Brand: IOBAN™ 2

## (undated) DEVICE — APPLICATOR MEDICATED 26 CC SOLUTION HI LT ORNG CHLORAPREP

## (undated) DEVICE — TOWEL,STOP FLAG GOLD N-W: Brand: MEDLINE

## (undated) DEVICE — SOLUTION IV 50ML 0.9% SOD CHL PLAS CONT USP VIAFLX

## (undated) DEVICE — GAUZE,SPONGE,4"X4",12PLY,STRL,LF,10/TRAY: Brand: MEDLINE

## (undated) DEVICE — SUTURE PERMAHAND SZ 2-0 L12X18IN NONABSORBABLE BLK SILK A185H

## (undated) DEVICE — BLADE CLIPPER SURG SENSICLIP

## (undated) DEVICE — PASSER 48409 60CM DISP. CATHETER

## (undated) DEVICE — TRAP FLUID

## (undated) DEVICE — 10 FR. PTFE PEEL-APART PERCUTANEOUS INTRODUCER KIT: Brand: PEEL-APART PERCUTANEOUS INTRODUCER KIT

## (undated) DEVICE — DRAPE,UTILTY,TAPE,15X26, 4EA/PK: Brand: MEDLINE

## (undated) DEVICE — SUTURE NRLN SZ 4-0 L18IN NONABSORBABLE BLK L13MM TF 1/2 CIR C584D

## (undated) DEVICE — SYRINGE MED 10ML TRNSLUC BRL PLUNG BLK MRK POLYPR CTRL

## (undated) DEVICE — SUTURE MONOCRYL + SZ 4-0 L27IN ABSRB UD L19MM PS-2 3/8 CIR MCP426H

## (undated) DEVICE — SOLUTION IV 1000ML 0.9% SOD CHL